# Patient Record
Sex: MALE | Race: WHITE | NOT HISPANIC OR LATINO | Employment: FULL TIME | URBAN - METROPOLITAN AREA
[De-identification: names, ages, dates, MRNs, and addresses within clinical notes are randomized per-mention and may not be internally consistent; named-entity substitution may affect disease eponyms.]

---

## 2019-05-24 ENCOUNTER — HOSPITAL ENCOUNTER (OUTPATIENT)
Dept: GENERAL RADIOLOGY | Facility: HOSPITAL | Age: 49
Discharge: HOME | End: 2019-05-24
Attending: NURSE PRACTITIONER

## 2019-06-13 ENCOUNTER — HOSPITAL ENCOUNTER (OUTPATIENT)
Dept: GENERAL RADIOLOGY | Facility: HOSPITAL | Age: 49
Discharge: HOME | End: 2019-06-13
Attending: NURSE PRACTITIONER

## 2019-08-31 ENCOUNTER — HOSPITAL ENCOUNTER (OUTPATIENT)
Dept: LAB | Facility: HOSPITAL | Age: 49
Discharge: HOME | End: 2019-08-31
Attending: EMERGENCY MEDICINE

## 2020-01-21 ENCOUNTER — HOSPITAL ENCOUNTER (OUTPATIENT)
Dept: SLEEP MEDICINE | Facility: HOSPITAL | Age: 50
Discharge: HOME | End: 2020-01-21
Attending: PSYCHIATRY & NEUROLOGY

## 2020-02-10 ENCOUNTER — HOSPITAL ENCOUNTER (OUTPATIENT)
Dept: SLEEP MEDICINE | Facility: HOSPITAL | Age: 50
Discharge: HOME | End: 2020-02-10
Attending: PSYCHIATRY & NEUROLOGY

## 2020-05-19 ENCOUNTER — HOSPITAL ENCOUNTER (OUTPATIENT)
Dept: GENERAL RADIOLOGY | Facility: HOSPITAL | Age: 50
Discharge: HOME | End: 2020-05-19
Attending: FAMILY MEDICINE

## 2020-06-25 ENCOUNTER — HOSPITAL ENCOUNTER (OUTPATIENT)
Dept: SLEEP MEDICINE | Facility: HOSPITAL | Age: 50
Discharge: HOME | End: 2020-06-25
Attending: PSYCHIATRY & NEUROLOGY

## 2020-09-25 ENCOUNTER — OFFICE VISIT CONVERTED (OUTPATIENT)
Dept: SURGERY | Facility: CLINIC | Age: 50
End: 2020-09-25
Attending: SURGERY

## 2020-10-03 ENCOUNTER — HOSPITAL ENCOUNTER (OUTPATIENT)
Dept: PREADMISSION TESTING | Facility: HOSPITAL | Age: 50
Discharge: HOME | End: 2020-10-03
Attending: SURGERY

## 2020-10-03 LAB — SARS-COV-2 RNA SPEC QL NAA+PROBE: NOT DETECTED

## 2020-10-08 ENCOUNTER — HOSPITAL ENCOUNTER (OUTPATIENT)
Dept: PERIOP | Facility: HOSPITAL | Age: 50
Setting detail: HOSPITAL OUTPATIENT SURGERY
Discharge: HOME | End: 2020-10-08
Attending: SURGERY

## 2020-10-23 ENCOUNTER — OFFICE VISIT CONVERTED (OUTPATIENT)
Dept: SURGERY | Facility: CLINIC | Age: 50
End: 2020-10-23
Attending: SURGERY

## 2020-10-23 ENCOUNTER — CONVERSION ENCOUNTER (OUTPATIENT)
Dept: SURGERY | Facility: CLINIC | Age: 50
End: 2020-10-23

## 2020-11-06 ENCOUNTER — OFFICE VISIT CONVERTED (OUTPATIENT)
Dept: SURGERY | Facility: CLINIC | Age: 50
End: 2020-11-06
Attending: SURGERY

## 2020-12-15 ENCOUNTER — HOSPITAL ENCOUNTER (OUTPATIENT)
Dept: SLEEP MEDICINE | Facility: HOSPITAL | Age: 50
Discharge: HOME | End: 2020-12-15
Attending: PSYCHIATRY & NEUROLOGY

## 2021-01-16 ENCOUNTER — HOSPITAL ENCOUNTER (OUTPATIENT)
Dept: OTHER | Facility: HOSPITAL | Age: 51
Discharge: HOME | End: 2021-01-16
Attending: PHYSICIAN ASSISTANT

## 2021-01-16 LAB
25(OH)D3 SERPL-MCNC: 13.2 NG/ML (ref 30–100)
ALBUMIN SERPL-MCNC: 3.4 G/DL (ref 3.5–5)
ALBUMIN/GLOB SERPL: 1 {RATIO} (ref 1.4–2.6)
ALP SERPL-CCNC: 80 U/L (ref 53–128)
ALT SERPL-CCNC: 29 U/L (ref 10–40)
ANION GAP SERPL CALC-SCNC: 21 MMOL/L (ref 8–19)
APPEARANCE UR: ABNORMAL
AST SERPL-CCNC: 44 U/L (ref 15–50)
BASOPHILS # BLD AUTO: 0.06 10*3/UL (ref 0–0.2)
BASOPHILS NFR BLD AUTO: 0.6 % (ref 0–3)
BILIRUB SERPL-MCNC: 1 MG/DL (ref 0.2–1.3)
BILIRUB UR QL: NEGATIVE
BUN SERPL-MCNC: 6 MG/DL (ref 5–25)
BUN/CREAT SERPL: 7 {RATIO} (ref 6–20)
CALCIUM SERPL-MCNC: 8.9 MG/DL (ref 8.7–10.4)
CHLORIDE SERPL-SCNC: 95 MMOL/L (ref 99–111)
CHOLEST SERPL-MCNC: 138 MG/DL (ref 107–200)
CHOLEST/HDLC SERPL: 3.4 {RATIO} (ref 3–6)
COLOR UR: YELLOW
CONV ABS IMM GRAN: 0.05 10*3/UL (ref 0–0.2)
CONV BACTERIA: NEGATIVE
CONV CO2: 23 MMOL/L (ref 22–32)
CONV COLLECTION SOURCE (UA): ABNORMAL
CONV IMMATURE GRAN: 0.5 % (ref 0–1.8)
CONV TOTAL PROTEIN: 6.9 G/DL (ref 6.3–8.2)
CONV UROBILINOGEN IN URINE BY AUTOMATED TEST STRIP: 1 {EHRLICHU}/DL (ref 0.1–1)
CREAT UR-MCNC: 0.83 MG/DL (ref 0.7–1.2)
DEPRECATED RDW RBC AUTO: 48 FL (ref 35.1–43.9)
EOSINOPHIL # BLD AUTO: 0.19 10*3/UL (ref 0–0.7)
EOSINOPHIL # BLD AUTO: 1.9 % (ref 0–7)
ERYTHROCYTE [DISTWIDTH] IN BLOOD BY AUTOMATED COUNT: 14.3 % (ref 11.6–14.4)
GFR SERPLBLD BASED ON 1.73 SQ M-ARVRAT: >60 ML/MIN/{1.73_M2}
GLOBULIN UR ELPH-MCNC: 3.5 G/DL (ref 2–3.5)
GLUCOSE SERPL-MCNC: 118 MG/DL (ref 70–99)
GLUCOSE UR QL: NEGATIVE MG/DL
HCT VFR BLD AUTO: 54.9 % (ref 42–52)
HDLC SERPL-MCNC: 41 MG/DL (ref 40–60)
HGB BLD-MCNC: 18.5 G/DL (ref 14–18)
HGB UR QL STRIP: ABNORMAL
KETONES UR QL STRIP: NEGATIVE MG/DL
LDLC SERPL CALC-MCNC: 81 MG/DL (ref 70–100)
LEUKOCYTE ESTERASE UR QL STRIP: NEGATIVE
LYMPHOCYTES # BLD AUTO: 2.02 10*3/UL (ref 1–5)
LYMPHOCYTES NFR BLD AUTO: 20.3 % (ref 20–45)
MCH RBC QN AUTO: 31.1 PG (ref 27–31)
MCHC RBC AUTO-ENTMCNC: 33.7 G/DL (ref 33–37)
MCV RBC AUTO: 92.3 FL (ref 80–96)
MONOCYTES # BLD AUTO: 1.2 10*3/UL (ref 0.2–1.2)
MONOCYTES NFR BLD AUTO: 12.1 % (ref 3–10)
NEUTROPHILS # BLD AUTO: 6.41 10*3/UL (ref 2–8)
NEUTROPHILS NFR BLD AUTO: 64.6 % (ref 30–85)
NITRITE UR QL STRIP: NEGATIVE
NRBC CBCN: 0 % (ref 0–0.7)
OSMOLALITY SERPL CALC.SUM OF ELEC: 279 MOSM/KG (ref 273–304)
PH UR STRIP.AUTO: 6.5 [PH] (ref 5–8)
PLATELET # BLD AUTO: 332 10*3/UL (ref 130–400)
PMV BLD AUTO: 9.8 FL (ref 9.4–12.4)
POTASSIUM SERPL-SCNC: 3.6 MMOL/L (ref 3.5–5.3)
PROT UR QL: NEGATIVE MG/DL
PSA SERPL-MCNC: 0.53 NG/ML (ref 0–4)
RBC # BLD AUTO: 5.95 10*6/UL (ref 4.7–6.1)
RBC #/AREA URNS HPF: ABNORMAL /[HPF]
SODIUM SERPL-SCNC: 135 MMOL/L (ref 135–147)
SP GR UR: 1.01 (ref 1–1.03)
T4 FREE SERPL-MCNC: 1.3 NG/DL (ref 0.9–1.8)
TESTOST SERPL-MCNC: 92 NG/DL (ref 193–740)
TRIGL SERPL-MCNC: 79 MG/DL (ref 40–150)
TSH SERPL-ACNC: 3.8 M[IU]/L (ref 0.27–4.2)
URATE SERPL-MCNC: 8.2 MG/DL (ref 3.5–8.5)
VLDLC SERPL-MCNC: 16 MG/DL (ref 5–37)
WBC # BLD AUTO: 9.93 10*3/UL (ref 4.8–10.8)
WBC #/AREA URNS HPF: ABNORMAL /[HPF]

## 2021-01-21 LAB — TESTOSTERONE, FREE: 7.2 PG/ML (ref 6.8–21.5)

## 2021-03-05 ENCOUNTER — HOSPITAL ENCOUNTER (OUTPATIENT)
Dept: ORTHOPEDIC SURGERY | Facility: CLINIC | Age: 51
Setting detail: RECURRING SERIES
Discharge: HOME | End: 2021-04-12
Attending: EMERGENCY MEDICINE

## 2021-04-02 ENCOUNTER — OFFICE VISIT CONVERTED (OUTPATIENT)
Dept: ORTHOPEDIC SURGERY | Facility: CLINIC | Age: 51
End: 2021-04-02
Attending: ORTHOPAEDIC SURGERY

## 2021-04-13 ENCOUNTER — HOSPITAL ENCOUNTER (OUTPATIENT)
Dept: URGENT CARE | Facility: CLINIC | Age: 51
Discharge: HOME | End: 2021-04-13
Attending: PHYSICIAN ASSISTANT

## 2021-04-15 ENCOUNTER — HOSPITAL ENCOUNTER (OUTPATIENT)
Dept: GENERAL RADIOLOGY | Facility: HOSPITAL | Age: 51
Discharge: HOME | End: 2021-04-15
Attending: ORTHOPAEDIC SURGERY

## 2021-04-23 ENCOUNTER — HOSPITAL ENCOUNTER (OUTPATIENT)
Dept: MRI IMAGING | Facility: HOSPITAL | Age: 51
Discharge: HOME | End: 2021-04-23
Attending: ORTHOPAEDIC SURGERY

## 2021-04-27 ENCOUNTER — OFFICE VISIT CONVERTED (OUTPATIENT)
Dept: PULMONOLOGY | Facility: CLINIC | Age: 51
End: 2021-04-27
Attending: NURSE PRACTITIONER

## 2021-04-30 ENCOUNTER — OFFICE VISIT CONVERTED (OUTPATIENT)
Dept: ORTHOPEDIC SURGERY | Facility: CLINIC | Age: 51
End: 2021-04-30
Attending: ORTHOPAEDIC SURGERY

## 2021-05-10 NOTE — H&P
History and Physical      Patient Name: Kevin Mcdermott   Patient ID: [id number]   Sex: Male   YOB: 1970    Referring Provider: Dion Portillo MD    Visit Date: April 2, 2021    Provider: Cm Rosenthal MD   Location: [facility]   Location Address: [address] [place] [address]  [id number]   Location Phone: [phone]          Chief Complaint  · Right Elbow Injury      History Of Present Illness  Kevin Mcdermott is a 50 year old /White male who presents today to [facility].      Patient presents today for an evaluation of right elbow injury. Patient states pain in his elbow for 2 months. He injured his elbow while at work when trying to unload a wheelchair. Patient states immediate pain after trying to unload the wheelchair. He states pain on the medial aspect of his elbow. Patient states that he has pain certain range of motion. Patient went and saw [facility] and they gave him a prescription for therapy. He states that therapy has not provided him with no relief. He was prescribed Aleeve that has not provided him with relief. Patient denies any numbness and tingling in his hands. This is a worker's comp case.       Past Medical History  Degeneration of lumbar intervertebral disc; High blood pressure; Hypertension; Mid back pain; Pain, Lumbar; Thoracic back pain         Past Surgical History  Cholecystectomy; Gallbladder; Hemorrhoid surgery; Lumbar epidural steroid injections         Medication List  atenolol 25 mg oral tablet; diclofenac sodium 75 mg oral tablet,delayed release (DR/EC); Eliquis 5 mg oral tablet; hydrochlorothiazide 50 mg oral tablet; lansoprazole 30 mg oral capsule,delayed release(DR/EC); lisinopril 40 mg oral tablet; Vitamin D3 5,000 unit oral tablet         Allergy List  NO KNOWN DRUG ALLERGIES       Allergies Reconciled  Family Medical History  -Father's Family History Unknown; -Mother's Family History Unknown; *No Known Family History         Social  "History  Alcohol Use (Never); .; lives with children; Recreational Drug Use (Never); Smokeless tobacco (Current every day); Tobacco (Never); Working         Review of Systems  · Constitutional  o Denies  o : fever, chills, weight loss  · Cardiovascular  o Denies  o : chest pain, shortness of breath  · Gastrointestinal  o Denies  o : liver disease, heartburn, nausea, blood in stools  · Genitourinary  o Denies  o : painful urination, blood in urine  · Integument  o Denies  o : rash, itching  · Neurologic  o Denies  o : headache, weakness, loss of consciousness  · Musculoskeletal  o Denies  o : painful, swollen joints  · Psychiatric  o Denies  o : drug/alcohol addiction, anxiety, depression      Vitals  Date Time BP Position Site L\R Cuff Size HR RR TEMP (F) WT  HT  BMI kg/m2 BSA m2 O2 Sat FR L/min FiO2        04/02/2021 09:26 AM      86 - R   325lbs 4oz 5'  8\" 49.45 2.66 96 %            Physical Examination  · Constitutional  o Appearance  o : well developed, well-nourished, no obvious deformities present  · Head and Face  o Head  o :   § Inspection  § : normocephalic  o Face  o :   § Inspection  § : no facial lesions  · Eyes  o Conjunctivae  o : conjunctivae normal  o Sclerae  o : sclerae white  · Ears, Nose, Mouth and Throat  o Ears  o :   § External Ears  § : appearance within normal limits  § Hearing  § : intact  o Nose  o :   § External Nose  § : appearance normal  · Neck  o Inspection/Palpation  o : normal appearance  o Range of Motion  o : full range of motion  · Respiratory  o Respiratory Effort  o : breathing unlabored  o Inspection of Chest  o : normal appearance  o Auscultation of Lungs  o : no audible wheezing or rales  · Cardiovascular  o Heart  o : regular rate  · Gastrointestinal  o Abdominal Examination  o : soft and non-tender  · Skin and Subcutaneous Tissue  o General Inspection  o : intact, no rashes  · Psychiatric  o General  o : Alert and oriented x3  o Judgement and Insight  o : " judgment and insight intact  o Mood and Affect  o : mood normal, affect appropriate  · Right Elbow  o Inspection  o : Sensation grossly intact. Neurovascular intact. No swelling, skin discoloration or atrophy. Tenderness to medial epicondyle. Full elbow range of motion. Good tone of deltoid, biceps, triceps, wrist extensors, and wrist flexors. Radial pulse 2+, ulnar pulse 2+. Pain with resisted wrist extension.   · In Office Procedures  o View  o : AP/LATERAL  o Site  o : right, elbow   o Indication  o : Right elbow pain  o Study  o : X-rays ordered, taken in the office, and reviewed today.  o Xray  o : No evidence of fracture or dislocation.               Assessment  · Right elbow pain     719.42/M25.521  · Medial epicondylitis, right     726.31/M77.00  · Elbow injury, right     959.3/S59.909A      Plan  · Orders  o Elbow (Right) 2 views X-Ray [facility] (48347-PM) - 719.42/M25.521 - 04/02/2021  · Medications  o Medications have been Reconciled  o Transition of Care or Provider Policy  · Instructions  o Dr. Rosenthal saw and examined the patient and agrees with plan.   o X-rays reviewed by Dr. Rosenthal.  o Reviewed the patient's Past Medical, Social, and Family history as well as the ROS at today's visit, no changes.  o Call or return if worsening symptoms.  o Exercise handout given.  o Follow up after MRI.  o Discussed treatment plans and diagnosis with the patient. Discussed injections, different medication and MRI. Patient opted to get an MRI of his elbow to rule out any ligament tears. He was given an at home exercise program to work on for the time being. Patient was given a modified work duty note today.   o The above service was scribed by Leslie Cummings on my behalf and I attest to the accuracy of the note. mc            Electronically Signed by: Leslie Cummings-, Other -Author on April 2, 2021 01:03:08 PM  Electronically Co-signed by: Cm Rosenthal MD -Reviewer on April 5, 2021 10:23:13 PM

## 2021-05-10 NOTE — H&P
History and Physical      Patient Name: Kevin Bradshaw   Patient ID: [id number]   Sex: Male   YOB: 1970    Referring Provider: Dion Portillo MD    Visit Date: September 25, 2020    Provider: Kevin Tinoco MD   Location: [facility]   Location Address: [address] [place] [address]  [id number]   Location Phone: [phone]          Chief Complaint  · Outpatient History & Physical / Surgical Orders  · Hemorrhoids      History Of Present Illness     Mr. Bradshaw came in today for evaluation. He is a very nice gentleman who has been having trouble with hemorrhoid symptoms for a while now. They seem to be getting worse. He has used hemorrhoid ointments in the past but here recently it doesn't seem to be as effective. He is starting to have a little bit more bleeding too than what he had before.       Past Medical History  Degeneration of lumbar intervertebral disc; High blood pressure; Hypertension; Mid back pain; Pain, Lumbar; Thoracic back pain         Past Surgical History  Cholecystectomy; Gallbladder; Lumbar epidural steroid injections         Medication List  atenolol 25 mg oral tablet; hydrochlorothiazide 50 mg oral tablet; lansoprazole 30 mg oral capsule,delayed release(DR/EC); lisinopril 40 mg oral tablet; nabumetone 500 mg oral tablet; Robaxin 500 mg oral tablet; Vitamin D3 5,000 unit oral tablet         Allergy List  NO KNOWN DRUG ALLERGIES         Family Medical History  -Father's Family History Unknown; -Mother's Family History Unknown; *No Known Family History         Social History  Smokeless tobacco (Current every day); Tobacco (Never)         Review of Systems  · Constitutional  o Denies  o : fever  · Eyes  o Denies  o : yellowish discoloration of eyes  · HENT  o Denies  o : difficulty swallowing  · Cardiovascular  o Denies  o : chest pain on exertion  · Respiratory  o Denies  o : shortness of breath  · Gastrointestinal  o Denies  o : nausea, vomiting, diarrhea,  "constipation  · Integument  o Denies  o : rash  · Neurologic  o Denies  o : tingling or numbness  · Musculoskeletal  o Denies  o : joint pain  · Endocrine  o Denies  o : weight gain, weight loss      Vitals  Date Time BP Position Site L\R Cuff Size HR RR TEMP (F) WT  HT  BMI kg/m2 BSA m2 O2 Sat HC       09/25/2020 11:26 AM       20  318lbs 0oz 5'  8.5\" 47.65 2.64           Physical Examination  · Constitutional  o Appearance  o : well-nourished, well developed, alert, in no acute distress  · Head and Face  o Head  o :   § Inspection  § : atraumatic, normocephalic  · Neck  o Inspection/Palpation  o : supple, normal range of motion  · Respiratory  o Inspection of Chest  o : normal inspection  o Auscultation of Lungs  o : breath sounds normal, no distress, clear to ascultate bilaterally  · Cardiovascular  o Heart  o :   § Auscultation of Heart  § : regular rate and rhythm, no murmur, gallop or rub  · Gastrointestinal  o Abdominal Examination  o : normal bowel sounds, non-tender, soft          Assessment  · Pre-Surgical Orders     V72.84       Very nice gentleman who has symptomatic hemorrhoids. His symptoms have been getting a little bit worse and would probably like to go ahead and have a procedure if possible.     Problems Reconciled  Plan  · Orders  o General Surgery Order (GENOR) - - 09/25/2020  · Medications  o Medications have been Reconciled  o Transition of Care or Provider Policy  · Instructions  o ****Surgical Orders****  o RISK AND BENEFITS:  o Consent for surgery: Given these options, the patient has verbally expressed an understanding of the risks of surgery and finds these risks acceptable. We will proceed with surgery as soon as possible.  o Consult Anesthesia for any post-operative block, or any pain management procedure deemed necessary by the anestesiologist for adequate post-operative pain control.   o O.R. PREP: Per protocol  o PLEASE SIGN PERMIT FOR:  o *___The above History and Physical " Examination has been completed within 30 days of admission.     I have told him I think he would be a good candidate for hemorrhoid banding. It seems like most of his hemorrhoid tissue is probably internal. I have described the procedure to him as well as the risks and benefits and he is agreeable to proceeding.             Electronically Signed by: Roz Chavez-, -Author on September 25, 2020 02:05:33 PM  Electronically Co-signed by: Kevin Tinoco MD -Reviewer on October 2, 2020 12:33:35 PM

## 2021-05-13 NOTE — PROGRESS NOTES
Progress Note      Patient Name: Kevin Bradshaw   Patient ID: [id number]   Sex: Male   YOB: 1970    Referring Provider: Dion Portillo MD    Visit Date: October 23, 2020    Provider: Kevin Tinoco MD   Location: [facility]   Location Address: [address] [place] [address]  [id number]   Location Phone: [phone]          Chief Complaint  · Follow Up Office Visit      History Of Present Illness     Mr. Bradshaw came back for follow-up. He is doing well following a hemorrhoid banding procedure. He is feeling better. He is not having as much discomfort with his bowel movements. He is not bleeding at this point.       Past Medical History  Degeneration of lumbar intervertebral disc; High blood pressure; Hypertension; Mid back pain; Pain, Lumbar; Thoracic back pain         Past Surgical History  Cholecystectomy; Gallbladder; Hemorrhoid surgery; Lumbar epidural steroid injections         Medication List  atenolol 25 mg oral tablet; hydrochlorothiazide 50 mg oral tablet; lansoprazole 30 mg oral capsule,delayed release(DR/EC); lisinopril 40 mg oral tablet; nabumetone 500 mg oral tablet; Robaxin 500 mg oral tablet; Vitamin D3 5,000 unit oral tablet         Allergy List  NO KNOWN DRUG ALLERGIES         Family Medical History  -Father's Family History Unknown; -Mother's Family History Unknown; *No Known Family History         Social History  Smokeless tobacco (Current every day); Tobacco (Never)         Review of Systems  · Cardiovascular  o Denies  o : chest pain, irregular heart beats, rapid heart rate, chest pain on exertion, shortness of breath, lower extremity swelling  · Respiratory  o Denies  o : shortness of breath, wheezing, cough, wheezing, chronic cough, coughing up blood  · Gastrointestinal  o Denies  o : nausea, vomiting, diarrhea, chronic abdominal pain, reflux symptoms      Vitals  Date Time BP Position Site L\R Cuff Size HR RR TEMP (F) WT  HT  BMI kg/m2 BSA m2 O2 Sat FR  "L/min FiO2        10/23/2020 09:01 AM       18  318lbs 0oz 5'  8.5\" 47.65 2.64             Physical Examination     Today on physical exam, he appears well. His abdomen is soft.           Assessment  · Postoperative Exam Following Surgery     V67.00       Doing okay status post hemorrhoid banding.       Plan  · Medications  o Medications have been Reconciled     I will see him back in two weeks. We will see how he is feeling then.             Electronically Signed by: Roz Chavez-, -Author on October 23, 2020 10:16:48 AM  Electronically Co-signed by: Kevin Tinoco MD -Reviewer on October 26, 2020 09:55:06 AM  "

## 2021-05-13 NOTE — PROGRESS NOTES
Progress Note      Patient Name: Kevin Mcdermott   Patient ID: [id number]   Sex: Male   YOB: 1970    Referring Provider: Dion Portillo MD    Visit Date: November 6, 2020    Provider: Kevin Tinoco MD   [facility]: [facility]   Location Address: [address] [place] [address]  [id number]   Location Phone: [phone]          Chief Complaint  · Follow Up Office Visit      History Of Present Illness     Kevin came back for follow-up. He is continuing to do well following a hemorrhoid banding procedure. He states that his pain is about 85% better than before than procedure and he is not having any bleeding at this point.       Past Medical History  Degeneration of lumbar intervertebral disc; High blood pressure; Hypertension; Mid back pain; Pain, Lumbar; Thoracic back pain         Past Surgical History  Cholecystectomy; Gallbladder; Hemorrhoid surgery; Lumbar epidural steroid injections         Medication List  atenolol 25 mg oral tablet; hydrochlorothiazide 50 mg oral tablet; lansoprazole 30 mg oral capsule,delayed release(DR/EC); lisinopril 40 mg oral tablet; nabumetone 500 mg oral tablet; Robaxin 500 mg oral tablet; Vitamin D3 5,000 unit oral tablet         Allergy List  NO KNOWN DRUG ALLERGIES         Family Medical History  -Father's Family History Unknown; -Mother's Family History Unknown; *No Known Family History         Social History  Smokeless tobacco (Current every day); Tobacco (Never)         Review of Systems  · Cardiovascular  o Denies  o : chest pain, irregular heart beats, rapid heart rate, chest pain on exertion, shortness of breath, lower extremity swelling  · Respiratory  o Denies  o : shortness of breath, wheezing, cough, wheezing, chronic cough, coughing up blood  · Gastrointestinal  o Denies  o : nausea, vomiting, diarrhea, chronic abdominal pain, reflux symptoms      Vitals  Date Time BP Position Site L\R Cuff Size HR RR TEMP (F) WT  HT  BMI kg/m2 BSA m2 O2  "Sat FR L/min FiO2 HC       11/06/2020 11:16 AM       16  317lbs 0oz 5'  8.5\" 47.5 2.64             Physical Examination     Today on physical exam, his perianal area looks good. He does not have any external disease.           Assessment  · Postoperative Exam Following Surgery     V67.00       Doing better status post hemorrhoid banding.       Plan  · Medications  o Medications have been Reconciled  o Transition of Care or Provider Policy     We will have him continue to try and avoid any straining or constipation. Otherwise, I will see him back on an as needed basis.             Electronically Signed by: Roz Chavez-, -Author on November 9, 2020 11:46:15 AM  Electronically Co-signed by: Kevin Tinoco MD -Reviewer on November 10, 2020 08:59:25 AM  "

## 2021-05-14 VITALS — WEIGHT: 315 LBS | HEIGHT: 68 IN | RESPIRATION RATE: 16 BRPM | BODY MASS INDEX: 47.74 KG/M2

## 2021-05-14 VITALS — RESPIRATION RATE: 18 BRPM | WEIGHT: 315 LBS | BODY MASS INDEX: 47.74 KG/M2 | HEIGHT: 68 IN

## 2021-05-14 VITALS — HEIGHT: 68 IN | HEART RATE: 76 BPM | WEIGHT: 315 LBS | OXYGEN SATURATION: 97 % | BODY MASS INDEX: 47.74 KG/M2

## 2021-05-14 VITALS — OXYGEN SATURATION: 96 % | HEART RATE: 86 BPM | BODY MASS INDEX: 47.74 KG/M2 | HEIGHT: 68 IN | WEIGHT: 315 LBS

## 2021-05-14 VITALS — RESPIRATION RATE: 20 BRPM | BODY MASS INDEX: 47.74 KG/M2 | HEIGHT: 68 IN | WEIGHT: 315 LBS

## 2021-05-14 NOTE — PROGRESS NOTES
Progress Note      Patient Name: Kevin Mcdermott   Patient ID: [id number]   Sex: Male   YOB: 1970    Referring Provider: Dion Portillo MD    Visit Date: April 30, 2021    Provider: Cm Rosenthal MD   Location: [facility]   Location Address: [address] [place] [address]  [id number]   Location Phone: [phone]          Chief Complaint  · Right Elbow Pain-MRI      History Of Present Illness  Kevin Mcdermott is a 50 year old /White male who presents today to [facility]. Patient is here for evaluation of his right elbow. He has been having pain since unloading a wheelchair. We got a MRI that shows no complete tears, lot of tendinitis.       Past Medical History  Degeneration of lumbar intervertebral disc; High blood pressure; Hypertension; Mid back pain; Pain, Lumbar; Thoracic back pain         Past Surgical History  Cholecystectomy; Gallbladder; Hemorrhoid surgery; Lumbar epidural steroid injections         Medication List  atenolol 25 mg oral tablet; diclofenac sodium 75 mg oral tablet,delayed release (DR/EC); Eliquis 5 mg oral tablet; hydrochlorothiazide 50 mg oral tablet; lansoprazole 30 mg oral capsule,delayed release(DR/EC); lisinopril 40 mg oral tablet; Vitamin D3 5,000 unit oral tablet         Allergy List  NO KNOWN DRUG ALLERGIES         Family Medical History  -Father's Family History Unknown; -Mother's Family History Unknown; *No Known Family History         Social History  Alcohol Use (Never); .; lives with children; Recreational Drug Use (Never); Smokeless tobacco (Current every day); Tobacco (Never); Working         Review of Systems  · Constitutional  o Denies  o : fever, chills, weight loss  · Cardiovascular  o Denies  o : chest pain, shortness of breath  · Gastrointestinal  o Denies  o : liver disease, heartburn, nausea, blood in stools  · Genitourinary  o Denies  o : painful urination, blood in urine  · Integument  o Denies  o : rash,  "itching  · Neurologic  o Denies  o : headache, weakness, loss of consciousness  · Musculoskeletal  o Denies  o : painful, swollen joints  · Psychiatric  o Denies  o : drug/alcohol addiction, anxiety, depression      Vitals  Date Time BP Position Site L\R Cuff Size HR RR TEMP (F) WT  HT  BMI kg/m2 BSA m2 O2 Sat FR L/min FiO2 HC       04/30/2021 10:34 AM      76 - R   325lbs 0oz 5'  8\" 49.42 2.66 97 %            Physical Examination  · Constitutional  o Appearance  o : well developed, well-nourished, no obvious deformities present  · Head and Face  o Head  o :   § Inspection  § : normocephalic  o Face  o :   § Inspection  § : no facial lesions  · Eyes  o Conjunctivae  o : conjunctivae normal  o Sclerae  o : sclerae white  · Ears, Nose, Mouth and Throat  o Ears  o :   § External Ears  § : appearance within normal limits  § Hearing  § : intact  o Nose  o :   § External Nose  § : appearance normal  · Neck  o Inspection/Palpation  o : normal appearance  o Range of Motion  o : full range of motion  · Respiratory  o Respiratory Effort  o : breathing unlabored  o Inspection of Chest  o : normal appearance  o Auscultation of Lungs  o : no audible wheezing or rales  · Cardiovascular  o Heart  o : regular rate  · Gastrointestinal  o Abdominal Examination  o : soft and non-tender  · Skin and Subcutaneous Tissue  o General Inspection  o : intact, no rashes  · Psychiatric  o General  o : Alert and oriented x3  o Judgement and Insight  o : judgment and insight intact  o Mood and Affect  o : mood normal, affect appropriate  · Right Elbow  o Inspection  o : Sensation intact. Pulse is normal. Affect is pleasant. No skin discoloration, atrophy or swelling.   · Injection Note/Aspiration Note  o Site  o : right elbow  o Procedure  o : Procedure: After educating the patient, patient gave consent for procedure. After using Chloraprep, the joint space was injected. The patient tolerated the procedure well.   o Medication  o : 80 mg of " DepoMedrol with 1cc of 1% Lidocaine          Assessment  · Right elbow pain     719.42/M25.521  · Right elbow tendinitis     726.90/M77.9      Plan  · Orders  o Depo-Medrol injection 80mg () - - 04/30/2021   Lot [id number] Exp 10 2021 [employer] Administered by DA Rosenthal MD  o Elbow-Lat Single Tendon (Injection) (45715) - - 04/30/2021   Lot [id number] Exp 12 2022 [employer] Administered by DA Rosenthal MD  · Medications  o Medications have been Reconciled  o Transition of Care or Provider Policy  · Instructions  o Reviewed the patient's Past Medical, Social, and Family history as well as the ROS at today's visit, no changes.  o Call or return if worsening symptoms.  o This note is transcribed by Calli Zacarias /sully  o Gave him a medial epicondyle shot. He is going to start some therapy and see how he does.             Electronically Signed by: Calli Zacarias, -Author on May 4, 2021 09:27:24 AM  Electronically Co-signed by: Cm Rosenthal MD -Reviewer on May 4, 2021 09:54:06 PM

## 2021-05-21 ENCOUNTER — OFFICE VISIT CONVERTED (OUTPATIENT)
Dept: ORTHOPEDIC SURGERY | Facility: CLINIC | Age: 51
End: 2021-05-21
Attending: PHYSICIAN ASSISTANT

## 2021-05-22 ENCOUNTER — TRANSCRIBE ORDERS (OUTPATIENT)
Dept: ADMINISTRATIVE | Facility: HOSPITAL | Age: 51
End: 2021-05-22

## 2021-05-22 DIAGNOSIS — I26.99 PULMONARY EMBOLISM, UNSPECIFIED CHRONICITY, UNSPECIFIED PULMONARY EMBOLISM TYPE, UNSPECIFIED WHETHER ACUTE COR PULMONALE PRESENT (HCC): Primary | ICD-10-CM

## 2021-05-25 ENCOUNTER — OFFICE VISIT CONVERTED (OUTPATIENT)
Dept: SURGERY | Facility: CLINIC | Age: 51
End: 2021-05-25
Attending: SURGERY

## 2021-05-25 ENCOUNTER — CONVERSION ENCOUNTER (OUTPATIENT)
Dept: SURGERY | Facility: CLINIC | Age: 51
End: 2021-05-25

## 2021-05-28 VITALS
HEART RATE: 75 BPM | DIASTOLIC BLOOD PRESSURE: 85 MMHG | SYSTOLIC BLOOD PRESSURE: 137 MMHG | BODY MASS INDEX: 47.74 KG/M2 | RESPIRATION RATE: 17 BRPM | WEIGHT: 315 LBS | TEMPERATURE: 97.2 F | OXYGEN SATURATION: 98 % | HEIGHT: 68 IN

## 2021-05-28 NOTE — PROGRESS NOTES
Patient: ALEXIS JORGE     Acct: [id number]     Report: #[id number]  UNIT #: [id number]     : 1970    Encounter Date:2021  PRIMARY CARE: GANGA SOARES  ***Signed***  --------------------------------------------------------------------------------------------------------------------  Chief Complaint      Encounter Date      2021            Primary Care Provider      Bianca Rudolph            Referring Provider            [facility]            Patient Complaint      Patient is complaining of      PT here today for [facility] F/U, Pulmonary Embolism            VITALS      Height 5 ft 8.00 in / 172.72 cm      Weight 317 lbs  / 143.737955 kg      BSA 2.49 m2      BMI 48.2 kg/m2      Temperature 97.2 F / 36.22 C - Temporal      Pulse 75      Respirations 17      Blood Pressure 137/85 Sitting, Left Arm      Pulse Oximetry 98%, room air            HPI      The patient is a 50 year old male who was recently hospitalized at [facility] from 2021 to 2021 for acute bilateral     pulmonary emboli and saddle pulmonary embolus. The patient had presented to the     hospital short of breath. The patient had been feeling poorly over the past     seven days prior to admission to the hospital and was seen at the [facility]     and was treated for allergies.  The patient at that time denied any fever,     chills or having any recent falls.  Shortness of breath became progressive over     the past 24 hours prior to admission and he became very dyspneic, worse with any    activity at all and had some associated chest discomfort on the left side, sharp    in nature, worse with deep breathing in and with rest, moderate in severity.  No    use of anticoagulation and no history of blood clots.  The patient did have a CT    scan of the chest and it did show a saddle pulmonary embolus with extensive     bilateral PEs and left sided pleural effusion with concern for possible      hemothorax.  The patient does drink alcohol and was a heavy drinker about 4-5     months ago and has cut back to about 5-6 drinks per day.  Pulmonary and Critical    Care were consulted to assist with patient's care. Ultrasound of the chest was     performed on 03/22/2021.  The patient had a small left sided pleural effusion,     but no need for thoracentesis at that time.  The patient was started on Eliquis.     The patient will need at least six months of Eliquis, however there was     recommendation for lifelong anticoagulation given unprovoked PE.  A venous     Duplex study came back negative for DVT. COVID PCR came back negative.      Polycythemia vera workup was ordered. The patient states he is under the care of    Dr. Latham and is scheduled to have another scan of his chest in two weeks and    has had several labs drawn recently.  The patient states he is scheduled to     follow up with Dr. Latham again in two weeks. The patient states that since     discharge from the hospital he is feeling better.  The patient currently denies     any dyspnea, cough, wheezing, fever, chills, night sweats, hemoptysis, purulent     sputum production, chest pain, chest tightness, swollen glands in the head and     neck, abdominal pain, nausea, vomiting or diarrhea.   The patient denies any     headaches, myalgias, sore throat, changes in sense of taste and/or smell or any     other coronavirus or flu-like symptoms.  The patient denies any leg swelling,     paroxysmal nocturnal dyspnea or orthopnea.  The patient denies any hematuria,     hematochezia, hematemesis or epistaxis.  The patient states that he is wearing     his CPAP everynight and that he is under the care of  the sleep center who is     managing his CPAP.  The patient states that he does dip tobacco and he has     decreased his drinking down to 12 ounces a day.  The patient states he is able     to perform his ADLs without difficulty.  The patient denies  any history of any     respiratory issues such as asthma, emphysema or COPD.              I have personally reviewed the review of systems, past family, social, surgical     and medical histories and I agree with those as entered in the chart.      Copies To:   VALE CASTILLO      Constitutional:  Denies: Fatigue, Fever, Weight gain, Weight loss, Chills,     Insomnia, Other      Respiratory/Breathing:  Denies: Shortness of air, Wheezing, Cough, Hemoptysis,     Pleuritic pain, Other      Endocrine:  Denies: Polydipsia, Polyuria, Heat/cold intolerance, Diabetes, Other      Eyes:  Denies: Blurred vision, Vision Changes, Other      Ears, nose, mouth, throat:  Denies: Mouth lesions, Thrush, Throat pain,     Hoarseness, Allergies/Hay Fever, Post Nasal Drip, Headaches, Recent Head Injury,    Nose Bleeding, Neck Stiffness, Thyroid Mass, Hearing Loss, Ear Fullness, Dry     Mouth, Nasal or Sinus Pain, Dry Lips, Nasal discharge, Nasal congestion, Other      Cardiovascular:  Denies: Palpitations, Syncope, Claudication, Chest Pain, Wake     up Gasping for air, Leg Swelling, Irregular Heart Rate, Cyanosis, Dyspnea on     Exertion, Other      Gastrointestinal:  Denies: Nausea, Constipation, Diarrhea, Abdominal pain,     Vomiting, Difficulty Swallowing, Reflux/Heartburn, Dysphagia, Jaundice,     Bloating, Melena, Bloody stools, Other      Genitourinary:  Denies: Urinary frequency, Incontinence, Hematuria, Urgency,     Nocturia, Dysuria, Testicular problems, Other      Musculoskeletal:  Denies: Joint Pain, Joint Stiffness, Joint Swelling, Myalgias,    Other      Hematologic/lymphatic:  DENIES: Lymphadenopathy, Bruising, Bleeding tendencies,     Other      Neurological:  Denies: Headache, Numbness, Weakness, Seizures, Other      Psychiatric:  Denies: Anxiety, Appropriate Effect, Depression, Other      Sleep:  No: Excessive daytime sleep, Morning Headache?, Snoring, Insomnia?, Stop    breathing at sleep?, Other       Integumentary:  Denies: Rash, Dry skin, Skin Warm to Touch, Other      Immunologic/Allergic:  Denies: Latex allergy, Seasonal allergies, Asthma,     Urticaria, Eczema, Other      Immunization status:  No: Up to date            FAMILY/SOCIAL/MEDICAL HX      Surgical History:  Yes: Cholecystectomy, Head Surgery (TONSILLECTOMY)      Social History:  Tobacco Use (currently just dips x 30 years)      Smoking status:  Never smoker (just dips x30 years)      Anticoagulation Therapy:  Yes      Antibiotic Prophylaxis:  No      Medical History:  Yes: Allergies, Heart Attack, Hemorrhoids/Rectal Prob (GERD),     High Blood Pressure; No: Arthritis, Asthma, Blood Disease, Chemotherapy/Cancer,     Chronic Bronchitis/COPD, Congestive Heart Failu, Deafness or Ringing Ears,     Diabetes, Seizures, Shortness Of Breath, Miscellaneous Medical/oth      Psychiatric History      none            PREVENTION      Hx Influenza Vaccination:  No      Influenza Vaccine Declined:  Yes      2 or More Falls in Past Year?:  No      Fall Past Year with Injury?:  No      Hx Pneumococcal Vaccination:  No      Encouraged to follow-up with:  PCP regarding preventative exams.      Chart initiated by      Keisha Ortiz CMA            ALLERGIES/MEDICATIONS      Allergies:        Coded Allergies:             ONION (Verified  Adverse Reaction, Mild, 4/27/21)           PEPPER (GENUS CAPSICUM) (Verified  Adverse Reaction, Mild, 4/27/21)                  ANY VARIETY OF PEPPERS      Medications    Last Reconciled on 4/27/21 13:37 by BRYANNA LIRA,       Apixaban (Eliquis) 5 Mg Tablet      5 MG PO BID for 30 Days, #60 TAB 5 Refills         Prov: Doc Paez         3/25/21       Thiamine Hcl (Vitamin B-1*) 100 Mg Tab      100 MG PO TID for 30 Days, #90 TAB 3 Refills         Prov: Doc Paez         3/25/21       Folic Acid (Folic Acid) 1 Mg Tablet      1 MG PO BID for 30 Days, #60 TAB 5 Refills         Prov: Doc Paez         3/25/21        Fexofenadine HCl (Fexofenadine HCl) 60 Mg Tablet      60 MG PO BID, #60 TAB 0 Refills         Reported         3/22/21       Azelastine Hcl (Azelastine Nasal) 137 Mcg/0.137 Ml Spray.pump      2 PUFFS NARE EACH BID, #30 ML         Reported         3/22/21       Allopurinol (Zyloprim*) 100 Mg Tablet      100 MG PO QDAY for 30 Days, #30 TAB 0 Refills         Reported         10/1/20       Pantoprazole (Protonix) 40 Mg Tablet.dr      40 MG PO QDAY@07, #30 TAB 0 Refills         Reported         10/1/20       Hctz (hydroCHLOROthiazide) 50 Mg Tablet      50 MG PO QDAY, TAB         Reported         10/1/20       Atenolol (ATENOLOL) 25 Mg Tablet      25 MG PO BID, #60 TAB 0 Refills         Prov: Vic Ross         6/20/17       Lisinopril* (Lisinopril*) 40 Mg Tablet      40 MG PO QDAY, #30 TAB 0 Refills         Reported         6/30/15      Current Medications      Current Medications Reviewed 4/27/21            EXAM      Vital Signs Reviewed.      General:  WDWN, Alert, NAD.      HEENT: PERRL, EOMI.  OP, nares clear, no sinus tenderness.      Neck: Supple, no JVD, no thyromegaly.      Lymph: No axillary, cervical, supraclavicular lymphadenopathy noted bilaterally.      Chest: Good aeration, clear to auscultation bilaterally, tympanic to percussion     bilaterally, no work of breathing noted.      CV: RRR, no MGR, pulses 2+, equal.        Abd: Soft, NT, ND, +BS, no HSM.      EXT: No clubbing, no cyanosis, no edema, no joint tenderness.        Neuro:  A  Skin: No rashes or lesions.      Vtials      Vitals:             Height 5 ft 8.00 in / 172.72 cm           Weight 317 lbs  / 143.601729 kg           BSA 2.49 m2           BMI 48.2 kg/m2           Temperature 97.2 F / 36.22 C - Temporal           Pulse 75           Respirations 17           Blood Pressure 137/85 Sitting, Left Arm           Pulse Oximetry 98%, room air            REVIEW      Results Reviewed      PCCS Results Reviewed?:  Yes Prev Lab Results, Yes Prev  Radiology Results, Yes     Previous M[facility] Records      Lab Results      I reviewed patient's discharge summary from recent hospital stay.  I also     reviewed patient's extremity venous study report, chest CT report and chest x-    ray reports.      Radiographic Results               [facility]                VASCULAR LAB REPORT            Patient: ALEXIS JORGE   Acct: #[id number]   Report: #[id number]            UNIT #: [id number]    DOS: 21 1717      INSURANCE:BLUE ACCESS NETWORK - PPO   ORDER #:[id number]      LOCATION:ICU  7   : 1970            PROVIDERS      ADMITTING:  Doc Paez   ATTENDING: Doc Paez      FAMILY:  Doc Paez   ORDERING:  Doc Paez         OTHER:    DICTATING:  SAMIR Quispe MD            REQ #:[id number]   EXAM:VELE - VENOUS EVAL LOWER DUPLEX      REASON FOR EXAM:  ACUTE HYPOXIC RESPIRATORY FAILURE, BILATERAL PE'S      REASON FOR VISIT:  ACUTE HYPOXIC RESPIRATORY FAILURE, BILATERAL PE'S            *******Signed******         PROCEDURE:   VENOUS EVAL LOWER DUPLEX             COMPARISON:   None.             INDICATIONS:   Pulmonary embolism.             TECHNIQUE:   Color duplex Doppler ultrasound evaluation and analysis of the deep    venous system of       both lower extremities was performed in the usual manner.               Right Impression:       Right common femoral vein shows normal flow and compressibility.  Greater     saphenous vein shows       normal compressibility.  Superficial femoral vein shows compressibility at all     levels with normal       augmentation responses.  Spontaneous flow is present.  Popliteal vein shows     normal compressibility       with normal spontaneous flow and slightly dampened augmentation.  The tibials     show patency.             Left Impression:       Not performed.  Patient refusal.             CONCLUSION:         1. This study is negative for an  acute deep venous thrombosis in the right lower    extremity.              Jose Quispe MD             Electronically Signed and Approved By: Jose Quispe MD on 3/23/2021 at 7:54                               Until signed, this is an unconfirmed preliminary report that may contain      errors and is subject to change.                                              YATGN:      D:21 0754               [facility]                PACS RADIOLOGY REPORT            Patient: ALEXIS JORGE   Acct: #[id number]   Report: #[id number]            UNIT #: [id number]    DOS: 21 0640      INSURANCE:BLUE ACCESS NETWORK - PPO   ORDER #:[id number]      LOCATION:ER     : 1970            PROVIDERS      ADMITTING:     ATTENDING:       FAMILY:  Doc Paez   ORDERING:  NEMO TROY         OTHER:    DICTATING:  WALTER DUVAL MD            REQ #:[id number]   EXAM:CHW - CT CHEST with CONTRAST      REASON FOR EXAM:  Chest Pain      REASON FOR VISIT:  SOA            *******Signed******         PROCEDURE:   CT CHEST W/ CONTRAST             COMPARISON:   [facility], CT, CT PULMONARY ANGIOGRAM, 2/15/2021,     5:46.             INDICATIONS:   GENERALIZED CHEST PAIN WITH SOB             TECHNIQUE:   After obtaining the patient's consent, CT images were obtained with    non-ionic       intravenous contrast material.               PROTOCOL:     Pulmonary embolism imaging protocol performed                RADIATION:     DLP: 851.6mGy*cm          Automated exposure control was utilized to minimize radiation dose.       CONTRAST:   100cc Isovue 370 I.V.             FINDINGS:         Extensive bilateral acute pulmonary embolus.  There is a saddle component.      Evaluation is somewhat       difficult secondary to body habitus and respiratory motion.  Embolus extends     into the segmental and       probably subsegmental pulmonary arteries in both lower lobes,  the right middle     lobe, and right       upper lobe.  There is probably also some embolus seen in the segmental and     subsegmental pulmonary       arteries of the left upper lobe.  Mild flattening of the interventricular     septum.             No evidence for acute aortic injury.  Moderate-sized left pleural effusion marcia    sures greater than       simple fluid attenuation.  No adenopathy in the chest.  No pneumothorax.  No     aggressive appearing       bone change.  Significant hepatic steatosis.             CONCLUSION:   Extensive bilateral acute pulmonary embolus as described above.      There is mild       flattening of the interventricular septum, suggesting a component of right heart    strain.             Moderate left pleural fluid collection, suspicious for hemothorax or complex     effusion.             Findings were discussed with the ordering clinician at the time of this di    ctation.              WALTER DUVAL MD             Electronically Signed and Approved By: WALTER DUVAL MD on 3/22/2021 at 10:22                               Until signed, this is an unconfirmed preliminary report that may contain      errors and is subject to change.                                              DOWER:      D:21 1022               [facility]                PACS RADIOLOGY REPORT            Patient: ALEXIS JORGE   Acct: #[id number]   Report: #[id number]            UNIT #: [id number]    DOS: 21 1531      INSURANCE:BLUE ACCESS NETWORK - O   ORDER #:[id number]      LOCATION:[facility]  420   : 1970            PROVIDERS      ADMITTING:  Doc Paez   ATTENDING: Doc Paez      FAMILY:  Doc Paez   ORDERING:  Carlos Pozo         OTHER:    DICTATING:  Hao Early MD, IV            REQ #:[id number]   EXAM:CXR2 - CHEST 2V AP PA LAT      REASON FOR EXAM:  effusion      REASON FOR VISIT:  ACUTE HYPOXIC RESPIRATORY  FAILURE, BILATERAL PE'S            *******Signed******         PROCEDURE:   CHEST AP/PA AND LATERAL             COMPARISON:   [facility], CT, CHEST W/ CONTRAST, 3/22/2021, 10:08.     [facility], CR, CHEST AP/PA 1 VIEW, 3/22/2021, 4:16.             INDICATIONS:   effusion             FINDINGS:                The lungs are well-expanded. The heart and pulmonary vasculature are within     normal limits.  There       is a small left pleural effusion.  There is mild left lower lobe airspace     consolidation.  No       evidence of pneumothorax.             IMPRESSION:  Small left pleural effusion.  Mild left lower lobe airspace     consolidation likely       atelectasis.             ELIZABETH GORDON MD             Electronically Signed and Approved By: ELIZABETH GORDON MD on 3/24/2021 at 16:44                                  Until signed, this is an unconfirmed preliminary report that may contain      errors and is subject to change.                                              BRYJE:      D:03/24/21 1644            Assessment      Pulmonary embolism - I26.99            Notes      Discontinued Medications      * Potassium Chloride 10 MEQ CAPSULE.ER: 10 MEQ PO TID 30 Days #90      * MAGNESIUM OXIDE (Magnesium Oxide*) 400 MG TABLET: 400 MG PO BID #60      * methylPREDNISolone Dosepak (Medrol Dosepak) 4 MG TAB.DS.PK: 4 MG PO ASDIR #1         Instructions: Take dosepack as directed. Take all of first day's tablets the        first day.      New Diagnostics      * Chest W/ Cont CT, 3 Months         Dx: Pulmonary embolism - I26.99      * Echo Complete         Dx: Pulmonary embolism - I26.99      ASSESSMENT:       1.  Acute saddle pulmonary embolus with bilateral pulmonary artery embolisms     with right heart strain.      2. Acute hypoxic respiratory secondary to #1.      3.  Left sided pleural effusion.      4. Alcohol use disorder.      5.  Tobacco abuse with dipping.      6. Obstructive  sleep apnea, patient on nightly CPAP, under the care of the Sleep    Center.              PLAN:      1. Discussed with patient hospital stay and plans moving forward.  I will     recheck a chest CT scan with contrast and echocardiogram again in three months     to ensure resolution of pulmonary embolism and to reassess right heart strain.      2. The patient reports he is under the care of Dr. Latham.  I will request a     copy of records and lab results from Dr. Latham's office.  Would recommend 6     months of Eliquis 5 mg twice a day, however would recommend lifelong     anticoagulation given unprovoked PE.      3. Patient is advised to call the office, 911 or go to the ER with any new or     worsening symptoms.      4. Follow up with Dr. Latham and his PCP as scheduled.      5. I spent three minutes counseling patient on alcohol use.   Alcohol counseling    was discussed with the patient and he declined any intervention at this time.     Risks of drinking alcohol was discussed with the patient in detail today which     include high blood pressure, heart disease, stroke, liver disease,  digestive     problems, memory problems, cancer of the breast, mouth, throat, esophagus,     liver, and colon, weakening of the immune system, mental health problems, etc.     The patient verbalized understanding. The patient states they will try to     decrease his drinking on his own.      6. I spent three minutes today counseling the patient on smoking cessation.  I     counseled the patient on the risks of continued smoking including the risk of     lung cancer, head and neck cancer, renal cancer, heart disease, stroke, and     early death. The patient refuses nicotine therapy and pharmacotherapy at this     time.  The patient is advised to avoid dipping.        7.  Patient refused flu, pneumonia and COVID vaccines. Risk of refusing vaccines    discussed with patient, patient verbalized understanding.  The patient is      advised to continue to follow CDC recommendations of social distancing, wearing     a mask and washing hands for at least 20 seconds.        8.  Follow up with Dr. Pozo in three months after tests are completed, sooner    if needed.            Patient Education      Patient Education Provided:  Acute Bronchitis      Time Spent:  > 50% /Coord Care            Electronically signed by BRYANNA LIRA [facility]  04/30/2021 08:52       Disclaimer: Converted document may not contain table formatting or lab diagrams. Please see [employer] for the authenticated document.

## 2021-06-04 ENCOUNTER — OFFICE VISIT CONVERTED (OUTPATIENT)
Dept: ORTHOPEDIC SURGERY | Facility: CLINIC | Age: 51
End: 2021-06-04
Attending: PHYSICIAN ASSISTANT

## 2021-06-05 NOTE — PROGRESS NOTES
Progress Note      Patient Name: Kevin Mcdermott   Patient ID: [id number]   Sex: Male   YOB: 1970    Referring Provider: Dion Portillo MD    Visit Date: June 4, 2021    Provider: Latia Mazariegos PA-C   Location: [facility]   Location Address: [address] [place] [address]  [id number]   Location Phone: [phone]          Chief Complaint  · Follow up right elbow pain      History Of Present Illness  Kevin Mcdermott is a 50 year old /White male who presents today to [facility]. Patient presents today for follow up of right elbow pain. Patient has been going to PT for partial thickness tears of tendons at the medial and lateral epicondyles as well as tendinopathy of the distal triceps at its insertion. 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 He has pain with lifting and has not exceeded 25 lbs in his therapy.       Past Medical History  Degeneration of lumbar intervertebral disc; High blood pressure; Hypertension; Mid back pain; Pain, Lumbar; Thoracic back pain         Past Surgical History  Cholecystectomy; Gallbladder; Hemorrhoid surgery; Lumbar epidural steroid injections         Medication List  atenolol 25 mg oral tablet; diclofenac sodium 75 mg oral tablet,delayed release (DR/EC); Eliquis 5 mg oral tablet; hydrochlorothiazide 50 mg oral tablet; lansoprazole 30 mg oral capsule,delayed release(DR/EC); lisinopril 40 mg oral tablet; Vitamin D3 5,000 unit oral tablet         Allergy List  NO KNOWN DRUG ALLERGIES       Allergies Reconciled  Family Medical History  -Father's Family History Unknown; -Mother's Family History Unknown; *No Known Family History         Social History  Alcohol Use (Never); .; lives with children; Recreational Drug Use (Never); Smokeless tobacco (Current every day); Tobacco (Never); Working         Review of Systems  · Constitutional  o Denies  o :  "fever, chills, weight loss  · Cardiovascular  o Denies  o : chest pain, shortness of breath  · Gastrointestinal  o Denies  o : liver disease, heartburn, nausea, blood in stools  · Genitourinary  o Denies  o : painful urination, blood in urine  · Integument  o Denies  o : rash, itching  · Neurologic  o Denies  o : headache, weakness, loss of consciousness  · Musculoskeletal  o Denies  o : painful, swollen joints  · Psychiatric  o Denies  o : drug/alcohol addiction, anxiety, depression      Vitals  Date Time BP Position Site L\R Cuff Size HR RR TEMP (F) WT  HT  BMI kg/m2 BSA m2 O2 Sat FR L/min FiO2 HC       06/04/2021 09:57 AM      71 - R   329lbs 0oz 5'  8\" 50.02 2.68 98 %            Physical Examination  · Constitutional  o Appearance  o : well developed, well-nourished, no obvious deformities present  · Head and Face  o Head  o :   § Inspection  § : normocephalic  o Face  o :   § Inspection  § : no facial lesions  · Eyes  o Conjunctivae  o : conjunctivae normal  o Sclerae  o : sclerae white  · Ears, Nose, Mouth and Throat  o Ears  o :   § External Ears  § : appearance within normal limits  § Hearing  § : intact  o Nose  o :   § External Nose  § : appearance normal  · Neck  o Inspection/Palpation  o : normal appearance  o Range of Motion  o : full range of motion  · Respiratory  o Respiratory Effort  o : breathing unlabored  o Inspection of Chest  o : normal appearance  o Auscultation of Lungs  o : no audible wheezing or rales  · Cardiovascular  o Heart  o : regular rate  · Gastrointestinal  o Abdominal Examination  o : soft and non-tender  · Skin and Subcutaneous Tissue  o General Inspection  o : intact, no rashes  · Psychiatric  o General  o : Alert and oriented x3  o Judgement and Insight  o : judgment and insight intact  o Mood and Affect  o : mood normal, affect appropriate  · Right Elbow  o Inspection  o : Tenderness of the medial epicondyle. Tenderness of the lateral epicondyle. Pain with wrist flexion " against resistance. Mild pain with protonation and supination. Good muscle tone of the biceps, triceps and deltoid. Sensation intact. Neurovascular intact. Radial pulse 2+.          Assessment  · Pain: Elbow     719.42/M25.529  · Tendinitis, elbow     726.90/M77.9      Plan  · Instructions  o Reviewed the patient's Past Medical, Social, and Family history as well as the ROS at today's visit, no changes.  o Call or return if worsening symptoms.  o Patient will remain off work for another 6 weeks and cont. PT during this time. In 6 weeks and then will consider another injection and determine his readiness to return to work full duty.            Electronically Signed by: RASHEL Steele-HEMA -Author on June 4, 2021 11:39:54 AM  Electronically Co-signed by: Cm Rosenthal MD -Reviewer on June 4, 2021 03:59:43 PM

## 2021-06-05 NOTE — H&P
"   History and Physical      Patient Name: Kevin Bradshaw   Patient ID: [id number]   Sex: Male   YOB: 1970    Referring Provider: Dion Portillo MD    Visit Date: May 25, 2021    Provider: Kevin Tinoco MD   Location: [facility]   Location Address: [address] [place] [address]  [id number]   Location Phone: [phone]          Chief Complaint  · rectus diastasis      History Of Present Illness     Mr. Bradshaw came in today for evaluation. He is a gentleman that we have taken care of in the past. He has a bulge in his upper abdomen. It is not really uncomfortable. He has not had an incision there before. He did have a PE diagnosed about six weeks ago and currently is on blood thinners.       Past Medical History  Degeneration of lumbar intervertebral disc; High blood pressure; Hypertension; Mid back pain; Pain, Lumbar; Thoracic back pain         Past Surgical History  Cholecystectomy; Gallbladder; Hemorrhoid surgery; Lumbar epidural steroid injections         Medication List  atenolol 25 mg oral tablet; diclofenac sodium 75 mg oral tablet,delayed release (DR/EC); Eliquis 5 mg oral tablet; hydrochlorothiazide 50 mg oral tablet; lansoprazole 30 mg oral capsule,delayed release(DR/EC); lisinopril 40 mg oral tablet; Vitamin D3 5,000 unit oral tablet         Allergy List  NO KNOWN DRUG ALLERGIES         Family Medical History  -Father's Family History Unknown; -Mother's Family History Unknown; *No Known Family History         Social History  Alcohol Use (Never); .; lives with children; Recreational Drug Use (Never); Smokeless tobacco (Current every day); Tobacco (Never); Working         Vitals  Date Time BP Position Site L\R Cuff Size HR RR TEMP (F) WT  HT  BMI kg/m2 BSA m2 O2 Sat FR L/min FiO2 HC       05/25/2021 02:05 PM       17  331lbs 6oz 5'  8\" 50.38 2.69             Physical Examination     Today on physical exam, he has a rectus diastasis in the upper midline abdomen. "           Assessment  · Rectus diastasis     728.84/M62.08      Plan  · Medications  o Medications have been Reconciled  o Transition of Care or Provider Policy     I reassured Mr. Bradshaw that this is not an issue that he would require surgery for and he was happy to hear that. I will see him back on an as needed basis.             Electronically Signed by: Roz Chavez-, -Author on May 26, 2021 09:31:44 AM  Electronically Co-signed by: Kevin Tinoco MD -Reviewer on May 26, 2021 04:17:48 PM

## 2021-06-05 NOTE — PROGRESS NOTES
Progress Note      Patient Name: Kevin Mcdermott   Patient ID: [id number]   Sex: Male   YOB: 1970    Referring Provider: Dion Portillo MD    Visit Date: May 21, 2021    Provider: Latia Mazariegos PA-C   Location: [facility]   Location Address: [address] [place] [address]  [id number]   Location Phone: [phone]          Chief Complaint  · Follow up right elbow pain      History Of Present Illness  Kevin Mcdermott is a 50 year old /White male who presents today to [facility]. Patient presents today for follow up of right elbow tendinitis. At his last visit with Dr. Rosenthal, he received injection of his medial epicondylitis which he states helped with his pain some. Dr. Rosenthal gave him an order for PT, he has only been able to attend twice due to understaffing of PT facility. He reports some pain of his elbow still.       Past Medical History  Degeneration of lumbar intervertebral disc; High blood pressure; Hypertension; Mid back pain; Pain, Lumbar; Thoracic back pain         Past Surgical History  Cholecystectomy; Gallbladder; Hemorrhoid surgery; Lumbar epidural steroid injections         Medication List  atenolol 25 mg oral tablet; diclofenac sodium 75 mg oral tablet,delayed release (DR/EC); Eliquis 5 mg oral tablet; hydrochlorothiazide 50 mg oral tablet; lansoprazole 30 mg oral capsule,delayed release(DR/EC); lisinopril 40 mg oral tablet; Vitamin D3 5,000 unit oral tablet         Allergy List  NO KNOWN DRUG ALLERGIES       Allergies Reconciled  Family Medical History  -Father's Family History Unknown; -Mother's Family History Unknown; *No Known Family History         Social History  Alcohol Use (Never); .; lives with children; Recreational Drug Use (Never); Smokeless tobacco (Current every day); Tobacco (Never); Working         Review of Systems  · Constitutional  o Denies  o : fever, chills, weight loss  · Cardiovascular  o Denies  o : chest pain, shortness  "of breath  · Gastrointestinal  o Denies  o : liver disease, heartburn, nausea, blood in stools  · Genitourinary  o Denies  o : painful urination, blood in urine  · Integument  o Denies  o : rash, itching  · Neurologic  o Denies  o : headache, weakness, loss of consciousness  · Musculoskeletal  o Denies  o : painful, swollen joints  · Psychiatric  o Denies  o : drug/alcohol addiction, anxiety, depression      Vitals  Date Time BP Position Site L\R Cuff Size HR RR TEMP (F) WT  HT  BMI kg/m2 BSA m2 O2 Sat FR L/min FiO2        05/21/2021 10:44 AM      76 - R   358lbs 0oz 5'  8\" 54.43 2.79 97 %            Physical Examination  · Constitutional  o Appearance  o : well developed, well-nourished, no obvious deformities present  · Head and Face  o Head  o :   § Inspection  § : normocephalic  o Face  o :   § Inspection  § : no facial lesions  · Eyes  o Conjunctivae  o : conjunctivae normal  o Sclerae  o : sclerae white  · Ears, Nose, Mouth and Throat  o Ears  o :   § External Ears  § : appearance within normal limits  § Hearing  § : intact  o Nose  o :   § External Nose  § : appearance normal  · Neck  o Inspection/Palpation  o : normal appearance  o Range of Motion  o : full range of motion  · Respiratory  o Respiratory Effort  o : breathing unlabored  o Inspection of Chest  o : normal appearance  o Auscultation of Lungs  o : no audible wheezing or rales  · Cardiovascular  o Heart  o : regular rate  · Gastrointestinal  o Abdominal Examination  o : soft and non-tender  · Skin and Subcutaneous Tissue  o General Inspection  o : intact, no rashes  · Psychiatric  o General  o : Alert and oriented x3  o Judgement and Insight  o : judgment and insight intact  o Mood and Affect  o : mood normal, affect appropriate  · Right Elbow  o Inspection  o : Good AROM of the elbow. Tenderness over medial epicondyle. Pain with protonation. Sensation intact. N/v intact, Radial and ulnar pulse are 2+.          Assessment  · Pain: " Elbow     719.42/M25.529  · Tendinitis, elbow     726.90/M77.9      Plan  · Instructions  o Reviewed the patient's Past Medical, Social, and Family history as well as the ROS at today's visit, no changes.  o Call or return if worsening symptoms.  o Follow Up in 2 weeks.  o Patient will attend PT sessions over the next two weeks. We will reevaluate pain and readiness for return to work next visit. May consider court-maker brace of elbow for his return to work.             Electronically Signed by: RASHEL Steele-HEMA -Author on May 21, 2021 11:47:36 AM  Electronically Co-signed by: Cm Rosenthal MD -Reviewer on May 26, 2021 08:04:58 PM

## 2021-06-20 ENCOUNTER — APPOINTMENT (OUTPATIENT)
Dept: GENERAL RADIOLOGY | Facility: HOSPITAL | Age: 51
End: 2021-06-20

## 2021-06-20 ENCOUNTER — HOSPITAL ENCOUNTER (EMERGENCY)
Facility: HOSPITAL | Age: 51
Discharge: HOME | End: 2021-06-21
Attending: EMERGENCY MEDICINE | Admitting: EMERGENCY MEDICINE

## 2021-06-20 DIAGNOSIS — R07.9 CHEST PAIN, UNSPECIFIED TYPE: Primary | ICD-10-CM

## 2021-06-20 LAB
ALBUMIN SERPL-MCNC: 3.9 G/DL (ref 3.5–5.2)
ALBUMIN/GLOB SERPL: 1.3 G/DL
ALP SERPL-CCNC: 61 U/L (ref 39–117)
ALT SERPL W P-5'-P-CCNC: 21 U/L (ref 1–41)
ANION GAP SERPL CALCULATED.3IONS-SCNC: 12.4 MMOL/L (ref 5–15)
AST SERPL-CCNC: 22 U/L (ref 1–40)
BASOPHILS # BLD AUTO: 0.05 10*3/MM3 (ref 0–0.2)
BASOPHILS NFR BLD AUTO: 0.5 % (ref 0–1.5)
BILIRUB SERPL-MCNC: 0.5 MG/DL (ref 0–1.2)
BUN SERPL-MCNC: 10 MG/DL (ref 6–20)
BUN/CREAT SERPL: 9.7 (ref 7–25)
CALCIUM SPEC-SCNC: 9.1 MG/DL (ref 8.6–10.5)
CHLORIDE SERPL-SCNC: 100 MMOL/L (ref 98–107)
CO2 SERPL-SCNC: 24.6 MMOL/L (ref 22–29)
CREAT SERPL-MCNC: 1.03 MG/DL (ref 0.76–1.27)
DEPRECATED RDW RBC AUTO: 42.4 FL (ref 37–54)
EOSINOPHIL # BLD AUTO: 0.3 10*3/MM3 (ref 0–0.4)
EOSINOPHIL NFR BLD AUTO: 2.7 % (ref 0.3–6.2)
ERYTHROCYTE [DISTWIDTH] IN BLOOD BY AUTOMATED COUNT: 12.9 % (ref 12.3–15.4)
GFR SERPL CREATININE-BSD FRML MDRD: 77 ML/MIN/1.73
GLOBULIN UR ELPH-MCNC: 3.1 GM/DL
GLUCOSE SERPL-MCNC: 131 MG/DL (ref 65–99)
HCT VFR BLD AUTO: 47.4 % (ref 37.5–51)
HGB BLD-MCNC: 15.8 G/DL (ref 13–17.7)
HOLD SPECIMEN: NORMAL
HOLD SPECIMEN: NORMAL
IMM GRANULOCYTES # BLD AUTO: 0.07 10*3/MM3 (ref 0–0.05)
IMM GRANULOCYTES NFR BLD AUTO: 0.6 % (ref 0–0.5)
LIPASE SERPL-CCNC: 23 U/L (ref 13–60)
LYMPHOCYTES # BLD AUTO: 1.5 10*3/MM3 (ref 0.7–3.1)
LYMPHOCYTES NFR BLD AUTO: 13.5 % (ref 19.6–45.3)
MCH RBC QN AUTO: 29.8 PG (ref 26.6–33)
MCHC RBC AUTO-ENTMCNC: 33.3 G/DL (ref 31.5–35.7)
MCV RBC AUTO: 89.3 FL (ref 79–97)
MONOCYTES # BLD AUTO: 1.06 10*3/MM3 (ref 0.1–0.9)
MONOCYTES NFR BLD AUTO: 9.5 % (ref 5–12)
NEUTROPHILS NFR BLD AUTO: 73.2 % (ref 42.7–76)
NEUTROPHILS NFR BLD AUTO: 8.12 10*3/MM3 (ref 1.7–7)
NRBC BLD AUTO-RTO: 0 /100 WBC (ref 0–0.2)
PLATELET # BLD AUTO: 300 10*3/MM3 (ref 140–450)
PMV BLD AUTO: 9.4 FL (ref 6–12)
POTASSIUM SERPL-SCNC: 3.6 MMOL/L (ref 3.5–5.2)
PROT SERPL-MCNC: 7 G/DL (ref 6–8.5)
RBC # BLD AUTO: 5.31 10*6/MM3 (ref 4.14–5.8)
SODIUM SERPL-SCNC: 137 MMOL/L (ref 136–145)
TROPONIN T SERPL-MCNC: <0.01 NG/ML (ref 0–0.03)
WBC # BLD AUTO: 11.1 10*3/MM3 (ref 3.4–10.8)
WHOLE BLOOD HOLD SPECIMEN: NORMAL

## 2021-06-20 PROCEDURE — 80053 COMPREHEN METABOLIC PANEL: CPT

## 2021-06-20 PROCEDURE — 93005 ELECTROCARDIOGRAM TRACING: CPT | Performed by: EMERGENCY MEDICINE

## 2021-06-20 PROCEDURE — 71045 X-RAY EXAM CHEST 1 VIEW: CPT

## 2021-06-20 PROCEDURE — 85025 COMPLETE CBC W/AUTO DIFF WBC: CPT

## 2021-06-20 PROCEDURE — 84484 ASSAY OF TROPONIN QUANT: CPT

## 2021-06-20 PROCEDURE — 99283 EMERGENCY DEPT VISIT LOW MDM: CPT

## 2021-06-20 PROCEDURE — 83690 ASSAY OF LIPASE: CPT

## 2021-06-20 PROCEDURE — 93005 ELECTROCARDIOGRAM TRACING: CPT

## 2021-06-20 RX ORDER — PANTOPRAZOLE SODIUM 40 MG/1
40 TABLET, DELAYED RELEASE ORAL DAILY
COMMUNITY
End: 2021-08-17 | Stop reason: SDUPTHER

## 2021-06-20 RX ORDER — SODIUM CHLORIDE 0.9 % (FLUSH) 0.9 %
10 SYRINGE (ML) INJECTION AS NEEDED
Status: DISCONTINUED | OUTPATIENT
Start: 2021-06-20 | End: 2021-06-21 | Stop reason: HOSPADM

## 2021-06-21 VITALS
BODY MASS INDEX: 47.74 KG/M2 | DIASTOLIC BLOOD PRESSURE: 46 MMHG | SYSTOLIC BLOOD PRESSURE: 107 MMHG | WEIGHT: 315 LBS | HEART RATE: 61 BPM | HEIGHT: 68 IN | RESPIRATION RATE: 16 BRPM | OXYGEN SATURATION: 95 % | TEMPERATURE: 98.6 F

## 2021-06-21 LAB
QT INTERVAL: 381 MS
TROPONIN T SERPL-MCNC: <0.01 NG/ML (ref 0–0.03)

## 2021-06-21 PROCEDURE — 84484 ASSAY OF TROPONIN QUANT: CPT | Performed by: EMERGENCY MEDICINE

## 2021-06-21 NOTE — ED PROVIDER NOTES
"Time: 15:21 EDT  Arrived by: private car  Chief Complaint:   Chief Complaint   Patient presents with   • Chest Pain     History provided by: PATIENT  History is limited by: N/A     History of Present Illness:  Patient is a 50 y.o. year old male that presents to the emergency department with complaints of chest pain. He reports that this started last night after eating. He states that he had a fish sandwich with chicken fries at [facility] for dinner last night and about an hour after is when his pain started. Pt notes that at one point today he tried to separate two buckets that were stuck together and strained himself doing it, so he states that his pain could be due to that.     Onset of Symptoms: 1 day(s) ago.  Duration:1 day(s)  Timing: constant   Location: GENERALIZED ABDOMEN  Quality: \"PAIN\"  Severity: MILD  Modifying factors: NOTHING  Associated signs/symptoms: NONE    Similar Symptoms Previously: NEVER  Recently seen: NO      Patient Care Team  Primary Care Provider: Bianca Rudolph PA-C    Past Medical History:       No Known Allergies  Past Medical History:   Diagnosis Date   • Allergies    • GERD (gastroesophageal reflux disease)    • Gout    • Hypertension    • Pulmonary embolism (CMS/HCC)      Past Surgical History:   Procedure Laterality Date   • CHOLECYSTECTOMY     • HEMORRHOIDECTOMY     • TONSILLECTOMY       Family History   Problem Relation Age of Onset   • Hypertension Mother    • Hypertension Father        Home Medications:  Prior to Admission medications    Medication Sig Start Date End Date Taking? Authorizing Provider   allopurinol (ZYLOPRIM) 100 MG tablet Take 100 mg by mouth Daily.    Emergency, Nurse GAMALIEL Jj   apixaban (Eliquis) 5 MG tablet tablet Eliquis 5 mg oral tablet take 1 tablet (5 mg) by oral route 2 times per day   Active    Emergency, Nurse GAMALIEL Jj   atenolol (TENORMIN) 25 MG tablet atenolol 25 mg oral tablet take 1 tablet (25 mg) by oral route once daily   Active    " Emergency, Nurse Epic, RN   cetirizine (zyrTEC) 10 MG tablet Take 10 mg by mouth Daily.    Emergency, Nurse Parviz RN   Cholecalciferol 125 MCG (5000 UT) tablet     Emergency, Nurse Parviz RN   diclofenac (VOLTAREN) 75 MG EC tablet diclofenac sodium 75 mg oral tablet,delayed release (DR/EC) take 1 tablet (75 mg) by oral route 2 times per day 4/2/2021  Active 4/2/21   Emergency, Nurse Parviz RN   folic acid (FOLVITE) 1 MG tablet Take 1 mg by mouth Daily.    Emergency, Nurse Parviz RN   hydroCHLOROthiazide (HYDRODIURIL) 50 MG tablet hydrochlorothiazide 50 mg oral tablet take 1 tablet (50 mg) by oral route once daily   Active    Emergency, Nurse Parviz RN   lansoprazole (PREVACID) 30 MG capsule lansoprazole 30 mg oral capsule,delayed release(DR/EC) take 1 capsule (30 mg) by oral route once daily before a meal   Active    Emergency, Nurse Parviz RN   lisinopril (PRINIVIL,ZESTRIL) 40 MG tablet lisinopril 40 mg oral tablet take 1 tablet (40 mg) by oral route once daily   Active    Emergency, Nurse Epic, RN   meloxicam (MOBIC) 15 MG tablet meloxicam 15 mg oral tablet take 1 tablet (15 mg) by oral route once daily   Suspended    Emergency, Nurse Epic, RN   thiamine (VITAMIN B-1) 100 MG tablet  tablet Take 100 mg by mouth Daily.    Emergency, Nurse GAMALIEL Jj        Social History:   Social History     Tobacco Use   • Smoking status: Never Smoker   • Smokeless tobacco: Never Used   Vaping Use   • Vaping Use: Never used   Substance Use Topics   • Alcohol use: Yes     Alcohol/week: 28.0 standard drinks     Types: 28 Standard drinks or equivalent per week   • Drug use: Never     Recent travel: not applicable     Record Review:  I have reviewed the patient's records in [facility].     Review of Systems:  Review of Systems   Constitutional: Negative for chills, fatigue and fever.   HENT: Negative for congestion, ear pain, rhinorrhea, sore throat and tinnitus.    Eyes: Negative for photophobia and pain.   Respiratory: Negative for choking and  "shortness of breath.    Cardiovascular: Positive for chest pain.   Gastrointestinal: Positive for abdominal pain. Negative for diarrhea, nausea and vomiting.   Endocrine: Negative for polydipsia.   Genitourinary: Negative for dysuria and hematuria.   Musculoskeletal: Negative for back pain and neck pain.   Skin: Negative for rash.   Allergic/Immunologic: Negative for food allergies.   Neurological: Negative for dizziness, seizures, light-headedness, numbness and headaches.   Hematological: Negative for adenopathy.   Psychiatric/Behavioral: Negative for self-injury and suicidal ideas.        Physical Exam:  /46   Pulse 61   Temp 98.6 °F (37 °C) (Oral)   Resp 16   Ht 172.7 cm (68\")   Wt (!) 154 kg (338 lb 13.6 oz)   SpO2 95%   BMI 51.52 kg/m²     Physical Exam  Vitals and nursing note reviewed.   Constitutional:       General: He is awake.      Appearance: Normal appearance.   HENT:      Head: Normocephalic and atraumatic.      Right Ear: Tympanic membrane, ear canal and external ear normal.      Left Ear: Tympanic membrane, ear canal and external ear normal.      Nose: Nose normal.      Mouth/Throat:      Mouth: Mucous membranes are moist.      Pharynx: Oropharynx is clear.   Eyes:      General: Lids are normal.      Extraocular Movements: Extraocular movements intact.      Conjunctiva/sclera: Conjunctivae normal.      Pupils: Pupils are equal, round, and reactive to light.   Cardiovascular:      Rate and Rhythm: Normal rate and regular rhythm.      Pulses: Normal pulses.      Heart sounds: Normal heart sounds.   Pulmonary:      Effort: Pulmonary effort is normal.      Breath sounds: Normal breath sounds and air entry.   Abdominal:      General: Bowel sounds are normal.      Palpations: Abdomen is soft.   Musculoskeletal:         General: Normal range of motion.      Right shoulder: Normal.      Left shoulder: Normal.      Right upper arm: Normal.      Left upper arm: Normal.      Right elbow: Normal.    "   Left elbow: Normal.      Right forearm: Normal.      Left forearm: Normal.      Right wrist: Normal.      Left wrist: Normal.      Right hand: Normal.      Left hand: Normal.      Cervical back: Normal, full passive range of motion without pain, normal range of motion and neck supple.      Thoracic back: Normal.      Lumbar back: Normal.      Right hip: Normal.      Left hip: Normal.      Right upper leg: Normal.      Left upper leg: Normal.      Right knee: Normal.      Left knee: Normal.      Right lower leg: Normal.      Left lower leg: Normal.      Right ankle: Normal.      Left ankle: Normal.      Right foot: Normal.      Left foot: Normal.   Skin:     General: Skin is warm and dry.   Neurological:      General: No focal deficit present.      Mental Status: He is alert and oriented to person, place, and time. Mental status is at baseline.      Cranial Nerves: Cranial nerves are intact.      Sensory: Sensation is intact.      Motor: Motor function is intact.      Coordination: Coordination is intact.   Psychiatric:         Attention and Perception: Attention and perception normal.         Mood and Affect: Mood and affect normal.         Speech: Speech normal.         Behavior: Behavior normal. Behavior is cooperative.         Thought Content: Thought content normal.         Cognition and Memory: Cognition and memory normal.         Judgment: Judgment normal.                Medications in the Emergency Department:  Medications - No data to display     Labs  Lab Results (last 24 hours)     Procedure Component Value Units Date/Time    CBC & Differential [[id number]]  (Abnormal) Collected: 06/20/21 2133    Specimen: Blood Updated: 06/20/21 2151    Narrative:      The following orders were created for panel order CBC & Differential.  Procedure                               Abnormality         Status                     ---------                               -----------         ------                     CBC Auto  Differential[[id number]]        Abnormal            Final result                 Please view results for these tests on the individual orders.    Comprehensive Metabolic Panel [[id number]]  (Abnormal) Collected: 06/20/21 2133    Specimen: Blood Updated: 06/20/21 2214     Glucose 131 mg/dL      BUN 10 mg/dL      Creatinine 1.03 mg/dL      Sodium 137 mmol/L      Potassium 3.6 mmol/L      Chloride 100 mmol/L      CO2 24.6 mmol/L      Calcium 9.1 mg/dL      Total Protein 7.0 g/dL      Albumin 3.90 g/dL      ALT (SGPT) 21 U/L      AST (SGOT) 22 U/L      Alkaline Phosphatase 61 U/L      Total Bilirubin 0.5 mg/dL      eGFR Non African Amer 77 mL/min/1.73      Globulin 3.1 gm/dL      A/G Ratio 1.3 g/dL      BUN/Creatinine Ratio 9.7     Anion Gap 12.4 mmol/L     Narrative:      GFR Normal >60  Chronic Kidney Disease <60  Kidney Failure <15      Lipase [[id number]]  (Normal) Collected: 06/20/21 2133    Specimen: Blood Updated: 06/20/21 2214     Lipase 23 U/L     Troponin [[id number]]  (Normal) Collected: 06/20/21 2133    Specimen: Blood Updated: 06/20/21 2214     Troponin T <0.010 ng/mL     Narrative:      Troponin T Reference Range:  <= 0.03 ng/mL-   Negative for AMI  >0.03 ng/mL-     Abnormal for myocardial necrosis.  Clinicians would have to utilize clinical acumen, EKG, Troponin and serial changes to determine if it is an Acute Myocardial Infarction or myocardial injury due to an underlying chronic condition.       Results may be falsely decreased if patient taking Biotin.      CBC Auto Differential [[id number]]  (Abnormal) Collected: 06/20/21 2133    Specimen: Blood Updated: 06/20/21 2151     WBC 11.10 10*3/mm3      RBC 5.31 10*6/mm3      Hemoglobin 15.8 g/dL      Hematocrit 47.4 %      MCV 89.3 fL      MCH 29.8 pg      MCHC 33.3 g/dL      RDW 12.9 %      RDW-SD 42.4 fl      MPV 9.4 fL      Platelets 300 10*3/mm3      Neutrophil % 73.2 %      Lymphocyte % 13.5 %      Monocyte % 9.5 %      Eosinophil % 2.7 %      Basophil %  0.5 %      Immature Grans % 0.6 %      Neutrophils, Absolute 8.12 10*3/mm3      Lymphocytes, Absolute 1.50 10*3/mm3      Monocytes, Absolute 1.06 10*3/mm3      Eosinophils, Absolute 0.30 10*3/mm3      Basophils, Absolute 0.05 10*3/mm3      Immature Grans, Absolute 0.07 10*3/mm3      nRBC 0.0 /100 WBC     Troponin [[id number]]  (Normal) Collected: 06/21/21 0048    Specimen: Blood Updated: 06/21/21 0147     Troponin T <0.010 ng/mL     Narrative:      Troponin T Reference Range:  <= 0.03 ng/mL-   Negative for AMI  >0.03 ng/mL-     Abnormal for myocardial necrosis.  Clinicians would have to utilize clinical acumen, EKG, Troponin and serial changes to determine if it is an Acute Myocardial Infarction or myocardial injury due to an underlying chronic condition.       Results may be falsely decreased if patient taking Biotin.             Imaging:  XR Chest 1 View   Final Result          Procedures:  Procedures    Progress  ED Course as of Jun 21 1521   Sun Jun 20, 2021   2214 Normal sinus rhythm with rate of 72. QRS normal. LA interval normal. QTc interval is normal. No ST elevation or depression. No T wave abnormalities. No change when compared to karissa. This EKG was interpreted by me.          ECG 12 Lead [LD]   Mon Jun 21, 2021   1519 WBC(!): 11.10 [LD]      ED Course User Index  [LD] Rossana Almonte MD           HEART Score: 3                  Medical Decision Making:  MDM  Number of Diagnoses or Management Options  Chest pain, unspecified type  Diagnosis management comments: Patient is afebrile and nontoxic appearing.   The patient had an EKG that shows no acute changes. Specifically, there are no ST elevations, t-wave changes of concern, delta waves, or rhythm abnormalities warranting admission. The patient was placed on the cardiac monitor and observed with continuous telemetry. The patient has a chest x-ray that is negative for pneumothorax, pneumonia, and is essentially unremarkable. The patient has had two  negative troponins on blood draw.      The patient is resting comfortably and feels better, is alert and in no distress. The repeat examination is unremarkable and benign. Electrocardiogram shows no signs of acute ischemia and the history, exam, diagnostic testing and current condition did not suggest that this patient is having an acute myocardial infarction, significant arrhythmia, unstable angina, esophageal perforation, pulmonary embolism, aortic dissection, severe pneumonia, sepsis for other significant pathology that would warrant further testing, continued ED treatment, admission, cardiology or other specialist consultation at this point. The vital signs have been stable. Heart score places patient at low risk for major acute cardiac event.     Patient has no RUQ abdominal tenderness. LFTs within normal range.      The patient's condition is stable and appropriate for discharge. The patient will pursue further outpatient evaluation with the primary care physician, or designated physician or cardiologist. The patient has expressed a clear and thorough understanding and agreed to follow-up as instructed. Discussed return precautions, discharge instructions and answered all their questions.           Amount and/or Complexity of Data Reviewed  Clinical lab tests: reviewed  Tests in the radiology section of CPT®: reviewed  Tests in the medicine section of CPT®: reviewed  Review and summarize past medical records: yes  Independent visualization of images, tracings, or specimens: yes    Risk of Complications, Morbidity, and/or Mortality  Presenting problems: moderate  Diagnostic procedures: low  Management options: low    Patient Progress  Patient progress: stable       Final diagnoses:   Chest pain, unspecified type        Disposition:  ED Disposition     ED Disposition Condition Comment    Discharge Stable           Documentation assistance provided by Rossana Almonte MD   acting as scribe for Rossana  MD Gage. Information recorded by the scribe was done at my direction and has been verified and validated by me.        Cesia Segura  06/20/21 2599       Cesia Segura  06/20/21 7263       Rossana Almonte MD  06/21/21 5254

## 2021-07-15 VITALS — OXYGEN SATURATION: 98 % | BODY MASS INDEX: 47.74 KG/M2 | WEIGHT: 315 LBS | HEIGHT: 68 IN | HEART RATE: 71 BPM

## 2021-07-15 VITALS — HEIGHT: 68 IN | WEIGHT: 315 LBS | RESPIRATION RATE: 16 BRPM | BODY MASS INDEX: 47.74 KG/M2

## 2021-07-15 VITALS — BODY MASS INDEX: 47.74 KG/M2 | OXYGEN SATURATION: 97 % | WEIGHT: 315 LBS | HEIGHT: 68 IN | HEART RATE: 76 BPM

## 2021-07-16 ENCOUNTER — OFFICE VISIT (OUTPATIENT)
Dept: ORTHOPEDIC SURGERY | Facility: CLINIC | Age: 51
End: 2021-07-16

## 2021-07-16 VITALS — HEIGHT: 68 IN | BODY MASS INDEX: 47.74 KG/M2 | WEIGHT: 315 LBS | HEART RATE: 73 BPM | OXYGEN SATURATION: 99 %

## 2021-07-16 DIAGNOSIS — M77.8 TENDINITIS OF ELBOW: Primary | ICD-10-CM

## 2021-07-16 PROCEDURE — 99212 OFFICE O/P EST SF 10 MIN: CPT | Performed by: PHYSICIAN ASSISTANT

## 2021-07-16 NOTE — PROGRESS NOTES
"Chief Complaint  Follow-up of the Right Elbow    Subjective          Kevin Bradshaw presents to [facility] for follow up of right elbow pain. Patient has been doing PT and treatment of his elbow since the beginning of April. Patient has partial thickness tears of tendons on both the medial and lateral malleolus. 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 Patient states he has made some progress in PT, but does not feel that he will be able to return to this type of work.     Objective   Vital Signs:   Pulse 73   Ht 172.7 cm (68\")   Wt (!) 153 kg (338 lb)   SpO2 99%   BMI 51.39 kg/m²       Physical Exam  Constitutional:       Appearance: Normal appearance. He is well-developed and normal weight.   HENT:      Head: Normocephalic.      Right Ear: Hearing and external ear normal.      Left Ear: Hearing and external ear normal.      Nose: Nose normal.   Eyes:      Conjunctiva/sclera: Conjunctivae normal.   Cardiovascular:      Rate and Rhythm: Normal rate.   Pulmonary:      Effort: Pulmonary effort is normal.      Breath sounds: No wheezing or rales.   Abdominal:      Palpations: Abdomen is soft.      Tenderness: There is no abdominal tenderness.   Musculoskeletal:      Cervical back: Normal range of motion.   Skin:     Findings: No rash.   Neurological:      Mental Status: He is alert and oriented to person, place, and time.   Psychiatric:         Mood and Affect: Mood and affect normal.         Judgment: Judgment normal.     Ortho Exam  Right elbow: Tenderness of the medial and lateral epicondyles.  Swelling or discoloration.  Full active range of motion of the elbow, pain with range of motion.  Pain against resistance with wrist in flexion and extension.  Good muscle tone in the biceps, triceps and deltoid muscle.  Normal pronation and supination.  Sensation is intact.  Neurovascular intact.  Radial and ulnar pulse are " 2+.      Result Review :            Imaging Results (Most Recent)     None                Assessment and Plan    Problem List Items Addressed This Visit        Musculoskeletal and Injuries    Tendinitis of elbow - Primary          Follow Up   Return if symptoms worsen or fail to improve.  Patient Instructions   Discussed diagnosis and treatment with patient. Recommend continuing anti-inflammatory use and HEP to help with elbow pain.   Recommend no lifting, pushing or pulling with affected extremity > 25 lbs or above the waist to avoid further injury.   Follow up with any changes or concerns.     Patient was given instructions and counseling regarding his condition or for health maintenance advice. Please see specific information pulled into the AVS if appropriate.

## 2021-07-16 NOTE — PATIENT INSTRUCTIONS
Discussed diagnosis and treatment with patient. Recommend continuing anti-inflammatory use and HEP to help with elbow pain.   Recommend no lifting, pushing or pulling with affected extremity > 25 lbs or above the waist to avoid further injury.   Follow up with any changes or concerns.

## 2021-07-22 ENCOUNTER — TELEPHONE (OUTPATIENT)
Dept: ORTHOPEDIC SURGERY | Facility: CLINIC | Age: 51
End: 2021-07-22

## 2021-07-27 ENCOUNTER — HOSPITAL ENCOUNTER (OUTPATIENT)
Dept: CT IMAGING | Facility: HOSPITAL | Age: 51
Discharge: HOME | End: 2021-07-27
Admitting: NURSE PRACTITIONER

## 2021-07-27 DIAGNOSIS — I26.99 PULMONARY EMBOLISM, UNSPECIFIED CHRONICITY, UNSPECIFIED PULMONARY EMBOLISM TYPE, UNSPECIFIED WHETHER ACUTE COR PULMONALE PRESENT (HCC): ICD-10-CM

## 2021-07-27 LAB — CREAT BLDA-MCNC: 0.8 MG/DL

## 2021-07-27 PROCEDURE — 71260 CT THORAX DX C+: CPT

## 2021-07-27 PROCEDURE — 82565 ASSAY OF CREATININE: CPT

## 2021-07-27 PROCEDURE — 0 IOPAMIDOL PER 1 ML: Performed by: NURSE PRACTITIONER

## 2021-07-27 RX ADMIN — IOPAMIDOL 100 ML: 755 INJECTION, SOLUTION INTRAVENOUS at 10:10

## 2021-07-28 PROBLEM — M1A.9XX0 CHRONIC GOUTY ARTHRITIS: Status: ACTIVE | Noted: 2021-07-28

## 2021-07-28 PROBLEM — I10 ESSENTIAL HYPERTENSION: Status: ACTIVE | Noted: 2021-07-28

## 2021-07-28 PROBLEM — M54.50 LOW BACK PAIN: Status: ACTIVE | Noted: 2021-07-28

## 2021-07-28 PROBLEM — K21.9 GASTRO-ESOPHAGEAL REFLUX DISEASE WITHOUT ESOPHAGITIS: Status: ACTIVE | Noted: 2021-07-28

## 2021-07-28 PROBLEM — E29.1 MALE HYPOGONADISM: Status: ACTIVE | Noted: 2021-07-28

## 2021-07-28 PROBLEM — E55.9 VITAMIN D DEFICIENCY: Status: ACTIVE | Noted: 2021-07-28

## 2021-07-28 PROBLEM — G47.33 OBSTRUCTIVE SLEEP APNEA: Status: ACTIVE | Noted: 2021-07-28

## 2021-07-28 PROBLEM — M1A.00X0 CHRONIC GOUTY ARTHRITIS: Status: ACTIVE | Noted: 2021-07-28

## 2021-08-14 ENCOUNTER — LAB (OUTPATIENT)
Dept: LAB | Facility: HOSPITAL | Age: 51
End: 2021-08-14

## 2021-08-14 ENCOUNTER — TRANSCRIBE ORDERS (OUTPATIENT)
Dept: ADMINISTRATIVE | Facility: HOSPITAL | Age: 51
End: 2021-08-14

## 2021-08-14 DIAGNOSIS — I10 ESSENTIAL HYPERTENSION, BENIGN: Primary | ICD-10-CM

## 2021-08-14 DIAGNOSIS — I10 ESSENTIAL HYPERTENSION, BENIGN: ICD-10-CM

## 2021-08-14 DIAGNOSIS — E55.9 VITAMIN D DEFICIENCY: ICD-10-CM

## 2021-08-14 DIAGNOSIS — Z13.6 SCREENING FOR HYPERTENSION: ICD-10-CM

## 2021-08-14 DIAGNOSIS — R73.01 IMPAIRED FASTING GLUCOSE: ICD-10-CM

## 2021-08-14 DIAGNOSIS — E29.1 MALE HYPOGONADISM: ICD-10-CM

## 2021-08-14 LAB
25(OH)D3 SERPL-MCNC: 13.2 NG/ML (ref 30–100)
ALBUMIN SERPL-MCNC: 3.7 G/DL (ref 3.5–5.2)
ALBUMIN/GLOB SERPL: 1.2 G/DL
ALP SERPL-CCNC: 54 U/L (ref 39–117)
ALT SERPL W P-5'-P-CCNC: 24 U/L (ref 1–41)
ANION GAP SERPL CALCULATED.3IONS-SCNC: 12.8 MMOL/L (ref 5–15)
AST SERPL-CCNC: 29 U/L (ref 1–40)
BASOPHILS # BLD AUTO: 0.07 10*3/MM3 (ref 0–0.2)
BASOPHILS NFR BLD AUTO: 0.8 % (ref 0–1.5)
BILIRUB SERPL-MCNC: 0.5 MG/DL (ref 0–1.2)
BUN SERPL-MCNC: 8 MG/DL (ref 6–20)
BUN/CREAT SERPL: 10.1 (ref 7–25)
CALCIUM SPEC-SCNC: 9 MG/DL (ref 8.6–10.5)
CHLORIDE SERPL-SCNC: 99 MMOL/L (ref 98–107)
CHOLEST SERPL-MCNC: 142 MG/DL (ref 0–200)
CO2 SERPL-SCNC: 23.2 MMOL/L (ref 22–29)
CREAT SERPL-MCNC: 0.79 MG/DL (ref 0.76–1.27)
DEPRECATED RDW RBC AUTO: 38.1 FL (ref 37–54)
EOSINOPHIL # BLD AUTO: 0.21 10*3/MM3 (ref 0–0.4)
EOSINOPHIL NFR BLD AUTO: 2.5 % (ref 0.3–6.2)
ERYTHROCYTE [DISTWIDTH] IN BLOOD BY AUTOMATED COUNT: 11.9 % (ref 12.3–15.4)
GFR SERPL CREATININE-BSD FRML MDRD: 104 ML/MIN/1.73
GLOBULIN UR ELPH-MCNC: 3.1 GM/DL
GLUCOSE SERPL-MCNC: 111 MG/DL (ref 65–99)
HCT VFR BLD AUTO: 50.8 % (ref 37.5–51)
HDLC SERPL-MCNC: 37 MG/DL (ref 40–60)
HGB BLD-MCNC: 17.1 G/DL (ref 13–17.7)
IMM GRANULOCYTES # BLD AUTO: 0.05 10*3/MM3 (ref 0–0.05)
IMM GRANULOCYTES NFR BLD AUTO: 0.6 % (ref 0–0.5)
LDLC SERPL CALC-MCNC: 80 MG/DL (ref 0–100)
LDLC/HDLC SERPL: 2.08 {RATIO}
LYMPHOCYTES # BLD AUTO: 1.62 10*3/MM3 (ref 0.7–3.1)
LYMPHOCYTES NFR BLD AUTO: 19.5 % (ref 19.6–45.3)
MCH RBC QN AUTO: 29.5 PG (ref 26.6–33)
MCHC RBC AUTO-ENTMCNC: 33.7 G/DL (ref 31.5–35.7)
MCV RBC AUTO: 87.6 FL (ref 79–97)
MONOCYTES # BLD AUTO: 0.89 10*3/MM3 (ref 0.1–0.9)
MONOCYTES NFR BLD AUTO: 10.7 % (ref 5–12)
NEUTROPHILS NFR BLD AUTO: 5.48 10*3/MM3 (ref 1.7–7)
NEUTROPHILS NFR BLD AUTO: 65.9 % (ref 42.7–76)
NRBC BLD AUTO-RTO: 0 /100 WBC (ref 0–0.2)
PLATELET # BLD AUTO: 330 10*3/MM3 (ref 140–450)
PMV BLD AUTO: 10 FL (ref 6–12)
POTASSIUM SERPL-SCNC: 3.8 MMOL/L (ref 3.5–5.2)
PROT SERPL-MCNC: 6.8 G/DL (ref 6–8.5)
RBC # BLD AUTO: 5.8 10*6/MM3 (ref 4.14–5.8)
SODIUM SERPL-SCNC: 135 MMOL/L (ref 136–145)
T4 FREE SERPL-MCNC: 1.27 NG/DL (ref 0.93–1.7)
TRIGL SERPL-MCNC: 141 MG/DL (ref 0–150)
TSH SERPL DL<=0.05 MIU/L-ACNC: 4.12 UIU/ML (ref 0.27–4.2)
VLDLC SERPL-MCNC: 25 MG/DL (ref 5–40)
WBC # BLD AUTO: 8.32 10*3/MM3 (ref 3.4–10.8)

## 2021-08-14 PROCEDURE — 82306 VITAMIN D 25 HYDROXY: CPT

## 2021-08-14 PROCEDURE — 80053 COMPREHEN METABOLIC PANEL: CPT

## 2021-08-14 PROCEDURE — 85025 COMPLETE CBC W/AUTO DIFF WBC: CPT

## 2021-08-14 PROCEDURE — 36415 COLL VENOUS BLD VENIPUNCTURE: CPT

## 2021-08-14 PROCEDURE — 80061 LIPID PANEL: CPT

## 2021-08-14 PROCEDURE — 84439 ASSAY OF FREE THYROXINE: CPT

## 2021-08-14 PROCEDURE — 84443 ASSAY THYROID STIM HORMONE: CPT

## 2021-08-17 ENCOUNTER — OFFICE VISIT (OUTPATIENT)
Dept: INTERNAL MEDICINE | Facility: CLINIC | Age: 51
End: 2021-08-17

## 2021-08-17 VITALS
HEIGHT: 68 IN | BODY MASS INDEX: 47.74 KG/M2 | SYSTOLIC BLOOD PRESSURE: 134 MMHG | DIASTOLIC BLOOD PRESSURE: 83 MMHG | HEART RATE: 77 BPM | WEIGHT: 315 LBS | OXYGEN SATURATION: 94 % | TEMPERATURE: 98.1 F

## 2021-08-17 DIAGNOSIS — Z86.711 HISTORY OF PULMONARY EMBOLUS (PE): ICD-10-CM

## 2021-08-17 DIAGNOSIS — I10 ESSENTIAL HYPERTENSION: ICD-10-CM

## 2021-08-17 DIAGNOSIS — M1A.00X0 CHRONIC GOUTY ARTHRITIS: ICD-10-CM

## 2021-08-17 DIAGNOSIS — E55.9 VITAMIN D DEFICIENCY: ICD-10-CM

## 2021-08-17 DIAGNOSIS — R73.01 IMPAIRED FASTING GLUCOSE: ICD-10-CM

## 2021-08-17 DIAGNOSIS — I10 ESSENTIAL HYPERTENSION: Primary | ICD-10-CM

## 2021-08-17 DIAGNOSIS — E29.1 MALE HYPOGONADISM: ICD-10-CM

## 2021-08-17 DIAGNOSIS — K21.9 GASTRO-ESOPHAGEAL REFLUX DISEASE WITHOUT ESOPHAGITIS: ICD-10-CM

## 2021-08-17 PROBLEM — F41.9 ANXIETY: Status: ACTIVE | Noted: 2021-08-17

## 2021-08-17 PROCEDURE — 99214 OFFICE O/P EST MOD 30 MIN: CPT | Performed by: NURSE PRACTITIONER

## 2021-08-17 RX ORDER — HYDROCHLOROTHIAZIDE 50 MG/1
50 TABLET ORAL DAILY
Qty: 30 TABLET | Refills: 5 | Status: SHIPPED | OUTPATIENT
Start: 2021-08-17 | End: 2022-03-29 | Stop reason: SDUPTHER

## 2021-08-17 RX ORDER — FOLIC ACID 1 MG/1
1 TABLET ORAL 2 TIMES DAILY
Qty: 60 TABLET | Refills: 5 | Status: SHIPPED | OUTPATIENT
Start: 2021-08-17 | End: 2021-09-16

## 2021-08-17 RX ORDER — ATENOLOL 25 MG/1
25 TABLET ORAL DAILY
Qty: 30 TABLET | Refills: 5 | Status: SHIPPED | OUTPATIENT
Start: 2021-08-17 | End: 2022-02-28

## 2021-08-17 RX ORDER — LISINOPRIL 40 MG/1
40 TABLET ORAL DAILY
Qty: 30 TABLET | Refills: 5 | Status: SHIPPED | OUTPATIENT
Start: 2021-08-17 | End: 2022-03-29 | Stop reason: SDUPTHER

## 2021-08-17 RX ORDER — ALLOPURINOL 100 MG/1
100 TABLET ORAL DAILY
Qty: 30 TABLET | Refills: 5 | Status: SHIPPED | OUTPATIENT
Start: 2021-08-17 | End: 2021-09-16

## 2021-08-17 RX ORDER — ERGOCALCIFEROL 1.25 MG/1
50000 CAPSULE ORAL WEEKLY
Qty: 12 CAPSULE | Refills: 1 | Status: SHIPPED | OUTPATIENT
Start: 2021-08-17 | End: 2021-11-15

## 2021-08-17 RX ORDER — PANTOPRAZOLE SODIUM 40 MG/1
40 TABLET, DELAYED RELEASE ORAL DAILY
Qty: 30 TABLET | Refills: 5 | Status: SHIPPED | OUTPATIENT
Start: 2021-08-17 | End: 2022-05-16

## 2021-08-17 NOTE — PROGRESS NOTES
"Chief Complaint  Hypertension    Subjective    History of Present Illness:  Kevin Bradshaw presents to [facility] for follow-up and labs.  Labs were reviewed.  Fasting glucose is elevated.  The patient has no family history of diabetes.  He denies polydips here or polyuria.  The patient is stable on medications and has no side effects. Recent CT of chest showed clear from clots and fatty liver disease. He uses ETOH. He drinks 2-4 beers daily and on weekends 2-3 more. Following with Dr. Camarena and he reports that he had a negative HIV there. He has had 2 doses covid vaccine.       Objective   Vital Signs:   /83 (BP Location: Left arm, Patient Position: Sitting)   Pulse 77   Temp 98.1 °F (36.7 °C)   Ht 172.7 cm (68\")   Wt (!) 157 kg (345 lb 9.6 oz)   SpO2 94%   BMI 52.55 kg/m²       Physical Exam :  General: Obese well nourished, well hydrated, in no acute distress  HEENT: Conjunctiva clear, no exudate bilateral  Neck: Supple, non-tender, no adenopathy  Skin: Warm and dry  Cardiovascular: S1 S2, regular rate and rhythm, no murmur  Respiratory: Clear to auscultation bilateral, no wheezes, rhonchi, or rales  Chest: Normal shape, no deformity, normal work of breathing  Extremities: 2+ pitting edema lower extremities  Neurological: Alert, conversing normally  Psychological: Good eye contact, mood good, and judgment intact        Assessment and Plan:  Diagnoses and all orders for this visit:    1. Essential hypertension (Primary)  Comments:  Continue current treatment plan.  Orders:  -     Lipid Panel; Future  -     TSH; Future  -     T4, Free; Future  -     Comprehensive Metabolic Panel; Future  -     Vitamin D 25 Hydroxy; Future  -     CBC & Differential; Future  -     folic acid (FOLVITE) 1 MG tablet; Take 1 tablet by mouth 2 (two) times a day for 30 days.  Dispense: 60 tablet; Refill: 5  -     hydroCHLOROthiazide (HYDRODIURIL) 50 MG tablet; Take 1 tablet by mouth Daily " for 30 days.  Dispense: 30 tablet; Refill: 5  -     lisinopril (PRINIVIL,ZESTRIL) 40 MG tablet; Take 1 tablet by mouth Daily for 30 days.  Dispense: 30 tablet; Refill: 5  -     atenolol (TENORMIN) 25 MG tablet; Take 1 tablet by mouth Daily for 30 days.  Dispense: 30 tablet; Refill: 5      2. History of pulmonary embolus (PE)  -     CBC & Differential; Future  -     apixaban (Eliquis) 5 MG tablet tablet; Take 1 tablet by mouth Every 12 (Twelve) Hours for 30 days.  Dispense: 60 tablet; Refill: 5    3. Vitamin D deficiency  Comments:  Education on vitamin D deficiency, treatment, medication, and follow-up.  Orders:  -     Vitamin D 25 Hydroxy; Future  -     vitamin D (ERGOCALCIFEROL) 1.25 MG (10761 UT) capsule capsule; Take 1 capsule by mouth 1 (One) Time Per Week for 90 days.  Dispense: 12 capsule; Refill: 1    4. Impaired fasting glucose  Comments:  The patient was instructed to have a hemoglobin A1c drawn this week and follow-up by phone for results.  Orders:  -     Hemoglobin A1c; Future    5. Gastro-esophageal reflux disease without esophagitis  Comments:  Continue current treatment plan.  Orders:  -     pantoprazole (PROTONIX) 40 MG EC tablet; Take 1 tablet by mouth Daily for 30 days.  Dispense: 30 tablet; Refill: 5    6. Chronic gouty arthritis  Comments:  Continue current treatment plan.  Orders:  -     allopurinol (ZYLOPRIM) 100 MG tablet; Take 1 tablet by mouth Daily for 30 days.  Dispense: 30 tablet; Refill: 5    7. Male hypogonadism  Comments:  Previous referral was made.      Follow Up:  Patient was given instructions and counseling regarding condition. Return in about 6 months (around 2/17/2022).

## 2021-08-21 ENCOUNTER — LAB (OUTPATIENT)
Dept: LAB | Facility: HOSPITAL | Age: 51
End: 2021-08-21

## 2021-08-21 DIAGNOSIS — R73.01 IMPAIRED FASTING GLUCOSE: ICD-10-CM

## 2021-08-21 LAB — HBA1C MFR BLD: 5.59 % (ref 4.8–5.6)

## 2021-08-21 PROCEDURE — 83036 HEMOGLOBIN GLYCOSYLATED A1C: CPT

## 2021-08-21 PROCEDURE — 36415 COLL VENOUS BLD VENIPUNCTURE: CPT

## 2021-08-23 ENCOUNTER — TELEPHONE (OUTPATIENT)
Dept: INTERNAL MEDICINE | Facility: CLINIC | Age: 51
End: 2021-08-23

## 2021-08-23 NOTE — TELEPHONE ENCOUNTER
"PATIENT CALLED ASKING FOR HIS BLOOD WORK RESULTS. I INFORMED HIM I WOULD SEND A MESSAGE ON TO MIKE.  PATIENT BECAME VERY HOSTILE STATING HE NEEDED TO KNOW HIS LABS NOW AND WHY COULDN'T I TELL HIM THIS. I EXPLAINED THE PROCEDURES FOR THIS AND HE STATED HE WOULD JUST STOP IN TOMORROW BECAUSE THAT WAY \"THEY WOULD HAVE TO TELL ME.\"  HE VOICED HIS FRUSTRATION WITH THE WAY THINGS HAVE TO BE DONE NOW. I REITERATED I  WOULD SEE TO IT THAT MIKE GOT THIS MESSAGE.          "

## 2021-08-23 NOTE — TELEPHONE ENCOUNTER
CALLED AND SPOKE WITH PT. HE WAS VERY DEMANDING ABOUT WHAT  WE WILL AND WONT DO FOR HIM. I ADVISED THE PT ABOUT THE MY CHART DELFINO ALSO THAT HE CAN VISIT [facility] [place] FOR A COPY OF HIS LABS THROUGH MEDICAL RECORDS. HE STATES HE WILL HIRE SOMEONE TO HELP FIGURE THIS OUT AS HE STATES [facility] WILL MAKE HIM WAIT 3 MONTH FOR ANYTHING AND HE COULD DIE BEFORE THEN. PT HUNG UP BEFORE THE CALL WAS COMPLETE.

## 2021-09-22 RX ORDER — LANOLIN ALCOHOL/MO/W.PET/CERES
CREAM (GRAM) TOPICAL
Qty: 90 TABLET | Refills: 3 | Status: SHIPPED | OUTPATIENT
Start: 2021-09-22 | End: 2022-03-29

## 2021-11-02 ENCOUNTER — TRANSCRIBE ORDERS (OUTPATIENT)
Dept: PHYSICAL THERAPY | Facility: CLINIC | Age: 51
End: 2021-11-02

## 2021-11-02 ENCOUNTER — TREATMENT (OUTPATIENT)
Dept: PHYSICAL THERAPY | Facility: CLINIC | Age: 51
End: 2021-11-02

## 2021-11-02 DIAGNOSIS — M25.521 RIGHT ELBOW PAIN: ICD-10-CM

## 2021-11-02 DIAGNOSIS — M77.01 MEDIAL EPICONDYLITIS OF RIGHT ELBOW: Primary | ICD-10-CM

## 2021-11-02 PROCEDURE — 97110 THERAPEUTIC EXERCISES: CPT | Performed by: OCCUPATIONAL THERAPIST

## 2021-11-02 PROCEDURE — 97140 MANUAL THERAPY 1/> REGIONS: CPT | Performed by: OCCUPATIONAL THERAPIST

## 2021-11-02 PROCEDURE — 97166 OT EVAL MOD COMPLEX 45 MIN: CPT | Performed by: OCCUPATIONAL THERAPIST

## 2021-11-02 NOTE — PROGRESS NOTES
Outpatient Occupational Therapy Ortho Initial Evaluation    Patient: Kevin Bradshaw   : 1970  Diagnosis/ICD-10 Code:  Medial epicondylitis of right elbow [M77.01]  Referring practitioner: Ranjit Rosenbaum MD  Date of Initial Visit: 2021  Today's Date: 2021  Patient seen for 1 sessions               Subjective Questionnaire: QuickDASH: 23      Subjective Evaluation    History of Present Illness  Date of onset: 3/21/2021  Mechanism of injury: Loading a wheelchair into a mini van 3/21 and felt a pain. Went to [facility], did therapy,  Referred to Dr. Rosenbaum. Has worn braces in past.  Referred for Eccentric stretching and strengthening, Nirschl Exercises, therabar, modalities.       Patient Occupation: Work for [employer] -    Precautions and Work Restrictions: limited to 25# Quality of life: excellent    Pain  Current pain rating: 3  Aggravating factors: lifting and repetitive movement  Progression: no change    Social Support  Lives with: adult children    Hand dominance: right    Diagnostic Tests  MRI studies: abnormal    Treatments  Previous treatment: injection treatment (occupational therapy)  Patient Goals  Patient goals for therapy: decreased pain  Patient goal: 60-70% better         Past Medical hx: no significant medical hx reported when asked, noted later that he has arthritis, degenerative disc disease.  Objective          Neurological Testing     Sensation     Elbow     Right Elbow   Intact: light touch    Active Range of Motion     Right Elbow   Forearm supination: 60 degrees   Forearm pronation: 70 degrees     Additional Active Range of Motion Details  Elbow flexed wrist flexed    75        Wrist extended  70  Elbow extended wrist flexed  70     Wrist extended   55    Strength/Myotome Testing     Additional Strength Details  Elbow flexed      R  60  L 70  Elbow extended R  70   L 65          Assessment & Plan     Assessment  Impairments: abnormal coordination, abnormal muscle firing, abnormal  or restricted ROM, activity intolerance, impaired physical strength, lacks appropriate home exercise program, pain with function, safety issue and weight-bearing intolerance  Assessment details: Pt reports pain with all functional tasks.  Pt point tender in medial epicondyle greater than Lateral epicondyle on R UE.  Pt having difficulty with lifting, pushing, pulling gripping.  Functional Limitations: carrying objects, lifting, pulling, pushing, reaching behind back, reaching overhead and unable to perform repetitive tasks  Goals  Plan Goals: 1. The patient complains of pain in the R elbow                    LTG 1: 12 weeks:  The patient will report a pain rating of 0/10 or better in order to improve sleep quality and tolerance to performance of activities of daily living.                                  STATUS:  New                  STG 1a: 4weeks:  The patient will report a pain rating of 2/10 or better.                                   STATUS:  New  2. The patient has limited ROM of the wrist ext with elbow ext                  LTG 2: 12 weeks:  The patient will demonstrate 65 degrees of wrist ext to allow the patient to push door open.                                  STATUS:  New                   STG 2a: 4 weeks:  The patient will demonstrate 60 degrees of wrist ext.                                  STATUS:  New                             3. The patient has limited strength of the R UE.                  LTG 3: 12 weeks:  The patient will demonstrate 65# in order to .                                  STATUS:  New                  STG 3a: 4 weeks:  The patient will demonstrate tolerance to light strengthening without adverse reaction.                                  STATUS:  New  4. Carrying, Moving, and Handling Objects Functional Limitation                                   LTG 4: 12 weeks:  The patient will demonstrate 1-19% limitation by achieving a score of 15 on the Quick  DASH.                                  STATUS:  New                  STG 4a: 4 weeks:  The patient will demonstrate 20-39% limitation by achieving a score of 20 on the Quick DASH.                                    STATUS:  New                    TREATMENT: Orthotic fabrication/fitting/management and training, Manual therapy, therapeutic exercise, home exercise instruction, and modalities as needed to include: electrical stimulation, ultrasound, moist heat, paraffin, fluidotherapy and ice.        Plan  Planned modality interventions: TENS, thermotherapy (hydrocollator packs), thermotherapy (paraffin bath), ultrasound and electrical stimulation/Russian stimulation  Other planned modality interventions: fluidotherapy  Planned therapy interventions: manual therapy, ADL retraining, motor coordination training, neuromuscular re-education, soft tissue mobilization, fine motor coordination training, body mechanics training, balance/weight-bearing training, functional ROM exercises, flexibility, spinal/joint mobilization, strengthening, stretching, therapeutic activities, IADL retraining, joint mobilization and home exercise program  Frequency: 2x week  Duration in weeks: 12  Treatment plan discussed with: patient        Patient is indicated for skilled occupational therapy services.    History # of Personal Factors and/or Comorbidities: MODERATE (1-2)  Examination of Body System(s): # of elements: MODERATE (3)  Clinical Presentation: EVOLVING  Clinical Decision Making: MODERATE     Evaluation:  Low Complexity:    0     mins  53895;  Mod Complexity:    15     mins  32849;  High Complexity:    0     mins  48043;    Timed:  Manual Therapy:    15     mins  95335;  Therapeutic Exercise:    15     mins  53225;  Therapeutic Activity:    0     mins  38445;     Neuromuscular Tamanna:    0    mins  69791;    Ultrasound:     0     mins  55050;    Electrical Stimulation:    0     mins  62714;    Untimed:  Electrical Stimulation:    0      mins  48910 ( );  Fluidotherapy:        0    mins  64921  Paraffin:                          0    mins  31896    Timed Treatment:   30   mins   Total Treatment:     45   mins      OT SIGNATURE: ANNABELLE Campbell, OTR/L, CHT     Electronically signed    [place] LICENSE: [id number]   DATE TREATMENT INITIATED: 12/9/2021    Initial Certification  Certification Period: 12/9/2021 thru 3/8/2022  I certify that the therapy services are furnished while this patient is under my care.  The services outlined above are required by this patient, and will be reviewed every 90 days.     PHYSICIAN: Referring, Self      DATE:     Please sign and return via fax to [phone]   Thank you, [facility] Occupational Therapy.

## 2021-11-16 ENCOUNTER — OFFICE VISIT (OUTPATIENT)
Dept: PULMONOLOGY | Facility: CLINIC | Age: 51
End: 2021-11-16

## 2021-11-16 ENCOUNTER — TREATMENT (OUTPATIENT)
Dept: PHYSICAL THERAPY | Facility: CLINIC | Age: 51
End: 2021-11-16

## 2021-11-16 VITALS
BODY MASS INDEX: 47.74 KG/M2 | OXYGEN SATURATION: 97 % | SYSTOLIC BLOOD PRESSURE: 125 MMHG | WEIGHT: 315 LBS | TEMPERATURE: 97.1 F | HEIGHT: 68 IN | DIASTOLIC BLOOD PRESSURE: 55 MMHG | HEART RATE: 69 BPM | RESPIRATION RATE: 18 BRPM

## 2021-11-16 DIAGNOSIS — M25.521 RIGHT ELBOW PAIN: ICD-10-CM

## 2021-11-16 DIAGNOSIS — Z86.711 HISTORY OF PULMONARY EMBOLUS (PE): Primary | ICD-10-CM

## 2021-11-16 DIAGNOSIS — M77.01 MEDIAL EPICONDYLITIS OF RIGHT ELBOW: Primary | ICD-10-CM

## 2021-11-16 DIAGNOSIS — G47.33 OBSTRUCTIVE SLEEP APNEA: ICD-10-CM

## 2021-11-16 PROCEDURE — 97530 THERAPEUTIC ACTIVITIES: CPT | Performed by: OCCUPATIONAL THERAPIST

## 2021-11-16 PROCEDURE — 99203 OFFICE O/P NEW LOW 30 MIN: CPT | Performed by: INTERNAL MEDICINE

## 2021-11-16 PROCEDURE — 97140 MANUAL THERAPY 1/> REGIONS: CPT | Performed by: OCCUPATIONAL THERAPIST

## 2021-11-16 PROCEDURE — 97110 THERAPEUTIC EXERCISES: CPT | Performed by: OCCUPATIONAL THERAPIST

## 2021-11-16 NOTE — PROGRESS NOTES
"Chief Complaint  Sleep Apnea and Follow-up (Chest ct and Echo results)    Subjective          Kevin Bradshaw presents to [facility]  History of Present Illness  Very pleasant 50-year-old male history of obstructive sleep apnea, history of PE which was unprovoked approximately 7 months ago here today for follow-up.  Patient doing well recent CT scan showed complete resolution of the clot, has no evidence of chronic thromboembolic pulmonary hypertension.  No significant lower extremity edema no shortness of breath at this time does all his ADLs without having any difficulties denies have any chest pains.  Objective   Vital Signs:   /55 (BP Location: Left arm, Patient Position: Sitting, Cuff Size: Large Adult)   Pulse 69   Temp 97.1 °F (36.2 °C) (Temporal)   Resp 18   Ht 172.7 cm (68\")   Wt (!) 160 kg (352 lb 12.8 oz)   SpO2 97% Comment: room air  BMI 53.64 kg/m²     Physical Exam   Vital Signs Reviewed  General WDWN, Alert, NAD.    HEENT:  PERRL, EOMI.  OP, nares clear, no sinus tenderness  Neck:  Supple, no JVD, no thyromegaly  Lymph: no axillary, cervical, supraclavicular lymphadenopathy noted bilaterally  Chest:  good aeration, clear to auscultation bilaterally, tympanic to percussion bilaterally, no work of breathing noted  CV: RRR, no MGR, pulses 2+, equal.  Abd:  Soft, NT, ND, + BS, no HSM  EXT:  no clubbing, no cyanosis, no edema, no joint tenderness  Neuro:  A&Ox3, CN grossly intact, no focal deficits.  Skin: No rashes or lesions noted  Result Review :                 Assessment and Plan    Diagnoses and all orders for this visit:    1. History of pulmonary embolus (PE) (Primary)  Assessment & Plan:  Patient is status post 6 months of anticoagulation    Patient did have significant clot burden and no identifiable cause of blood clot    Discussed with the patient at this time that we can either discontinue anticoagulation or given his numerous " high-volume blood clot which was unprovoked the other alternative would be to continue lifelong anticoagulation and we can consider reducing the dose of Eliquis to 2.5 twice daily    Patient voices understanding and is very concerned about the possibility of developing a repeat blood clot    Also seeing hematology oncology who is recommended continuing anticoagulation as long as I am okay with it    Discussed with the patient shared decision-making used to determine if we will continue anticoagulation indefinitely      2. Obstructive sleep apnea  Assessment & Plan:  Symptoms seem to be controlled at this time    Continue PAP therapy at current settings      I spent 20 minutes caring for Kevin on this date of service. This time includes time spent by me in the following activities:preparing for the visit, reviewing tests, obtaining and/or reviewing a separately obtained history, performing a medically appropriate examination and/or evaluation  and documenting information in the medical record  Follow Up   Return in about 1 year (around 11/16/2022).  Patient was given instructions and counseling regarding his condition or for health maintenance advice. Please see specific information pulled into the AVS if appropriate.

## 2021-11-16 NOTE — PROGRESS NOTES
Occupational Therapy Daily Treatment Note      Patient: Kevin Bradshaw   : 1970  Referring practitioner: Ranjit Rosenbaum MD  Date of Initial Visit: Type: THERAPY  Noted: 2021  Today's Date: 2021  Patient seen for 2 sessions    ICD-10-CM ICD-9-CM   1. Medial epicondylitis of right elbow  M77.01 726.31   2. Right elbow pain  M25.521 719.42          Kevin Bradshaw reports It has been better and it has been worse 4/10    Objective   See Exercise, Manual, and Modality Logs for complete treatment.   Pt able to progress extensor stretch from position 1 to 2.  Kinesiotape applied to inhibit extensors and space correction over Lateral epicondyle.     Assessment/Plan  Pt had relief after ASTYM last visit.  Pt needs education on what to expect and progression of healing.        Cont per POC           Timed:  Manual Therapy:    10     mins  29983;  Therapeutic Exercise:    10     mins  08329;  Therapeutic Activity:    10     mins  76201;     Neuromuscular Tamanna:    0    mins  18450;    Ultrasound:     0     mins  89756;    Electrical Stimulation:    0     mins  05041;    Untimed:  Electrical Stimulation:    0     mins  78937 ( );  Fluidotherapy:        0    mins  60750  Paraffin:                          0    mins  76233    Timed Treatment:   30   mins   Total Treatment:     30   mins    OT SIGNATURE: ANNABELLE Campbell, OTR/L, CHT     Electronically signed    [place] LICENSE: [id number]

## 2021-11-16 NOTE — ASSESSMENT & PLAN NOTE
Patient is status post 6 months of anticoagulation    Patient did have significant clot burden and no identifiable cause of blood clot    Discussed with the patient at this time that we can either discontinue anticoagulation or given his numerous high-volume blood clot which was unprovoked the other alternative would be to continue lifelong anticoagulation and we can consider reducing the dose of Eliquis to 2.5 twice daily    Patient voices understanding and is very concerned about the possibility of developing a repeat blood clot    Also seeing hematology oncology who is recommended continuing anticoagulation as long as I am okay with it    Discussed with the patient shared decision-making used to determine if we will continue anticoagulation indefinitely

## 2021-11-23 ENCOUNTER — TREATMENT (OUTPATIENT)
Dept: PHYSICAL THERAPY | Facility: CLINIC | Age: 51
End: 2021-11-23

## 2021-11-23 DIAGNOSIS — M77.01 MEDIAL EPICONDYLITIS OF RIGHT ELBOW: Primary | ICD-10-CM

## 2021-11-23 DIAGNOSIS — M25.521 RIGHT ELBOW PAIN: ICD-10-CM

## 2021-11-23 PROCEDURE — 97110 THERAPEUTIC EXERCISES: CPT | Performed by: OCCUPATIONAL THERAPIST

## 2021-11-23 PROCEDURE — 97140 MANUAL THERAPY 1/> REGIONS: CPT | Performed by: OCCUPATIONAL THERAPIST

## 2021-11-23 PROCEDURE — 97530 THERAPEUTIC ACTIVITIES: CPT | Performed by: OCCUPATIONAL THERAPIST

## 2021-11-23 NOTE — PROGRESS NOTES
Occupational Therapy Daily Treatment Note      Patient: Kevin Bradshaw   : 1970  Referring practitioner: Ranjit Rosenbaum MD  Date of Initial Visit: Type: THERAPY  Noted: 2021  Today's Date: 2021  Patient seen for 3 sessions    ICD-10-CM ICD-9-CM   1. Medial epicondylitis of right elbow  M77.01 726.31   2. Right elbow pain  M25.521 719.42          Kevin Bradshaw reports I am about the same.        Objective   See Exercise, Manual, and Modality Logs for complete treatment.   Pt had decreased overall point tenderness over LE and triceps insertion.  Pt tolerated ASTYM well this date.     Assessment/Plan  Pt educated on need to do 2x/wk per plan to increase effectiveness of manual treatment and help progress functional improvements.   Cont per POC           Timed:  Manual Therapy:    10     mins  57286;  Therapeutic Exercise:    10     mins  23505;  Therapeutic Activity:    10     mins  18513;     Neuromuscular Tamanna:    0    mins  36082;    Ultrasound:     0     mins  41029;    Electrical Stimulation:    0     mins  88822;    Untimed:  Electrical Stimulation:    0     mins  65568 ( );  Fluidotherapy:        0    mins  66624  Paraffin:                          0    mins  60671    Timed Treatment:   30   mins   Total Treatment:     30   mins    OT SIGNATURE: ANNABELLE Campbell, OTR/L, CHT     Electronically signed    [place] LICENSE: [id number]

## 2021-11-30 ENCOUNTER — TELEPHONE (OUTPATIENT)
Dept: PULMONOLOGY | Facility: CLINIC | Age: 51
End: 2021-11-30

## 2021-11-30 ENCOUNTER — TREATMENT (OUTPATIENT)
Dept: PHYSICAL THERAPY | Facility: CLINIC | Age: 51
End: 2021-11-30

## 2021-11-30 DIAGNOSIS — M25.521 RIGHT ELBOW PAIN: ICD-10-CM

## 2021-11-30 DIAGNOSIS — M77.01 MEDIAL EPICONDYLITIS OF RIGHT ELBOW: Primary | ICD-10-CM

## 2021-11-30 PROCEDURE — 97110 THERAPEUTIC EXERCISES: CPT | Performed by: OCCUPATIONAL THERAPIST

## 2021-11-30 PROCEDURE — 97530 THERAPEUTIC ACTIVITIES: CPT | Performed by: OCCUPATIONAL THERAPIST

## 2021-11-30 NOTE — TELEPHONE ENCOUNTER
PT is needing a letter from  stating that he can get massages again due to his blood clot. He stated that when he was in last  stated he would give him the clearance letter. PT will come back to pick it up. If you have any questions please contact PT.   Yes, the patient is being discharged from [facility]...

## 2021-11-30 NOTE — PROGRESS NOTES
Re-Assessment / Re-Certification      Patient: Kevin Bradshaw   : 1970  Diagnosis/ICD-10 Code:  Medial epicondylitis of right elbow [M77.01]  Referring practitioner: Ranjit Rosenbaum MD  Date of Initial Visit: Type: THERAPY  Noted: 2021  Today's Date: 2021  Patient seen for 4 sessions      Subjective:   Kevin Bradshaw reports: Yesterday I was lifting a toddler in a car seat out of the van and felt a pull.  Now I am feeling it more.  Subjective Questionnaire: QuickDASH: 20  Clinical Progress: unchanged  Home Program Compliance: Yes  Treatment has included: therapeutic exercise, neuromuscular re-education, manual therapy, therapeutic activity and electrical stimulation    Objective   Active Range of Motion      Right Elbow   Forearm supination: 55 degrees   Forearm pronation: 75 degrees     Additional Active Range of Motion Details  Elbow flexed wrist flexed    60        Wrist extended  60  Elbow extended wrist flexed  50     Wrist extended   65     Strength/Myotome Testing     Additional Strength Details  Elbow flexed      R  60    Elbow extended R  63          Assessment/Plan  Pt has attended 4/7 visits.  Pt continues to lift and  with work tasks, and has recently reported feeling a strain at the elbow with lifting.      Visit Diagnoses:    ICD-10-CM ICD-9-CM   1. Medial epicondylitis of right elbow  M77.01 726.31   2. Right elbow pain  M25.521 719.42       Progress toward previous goals: Not Met    Plan Goals: 1. The patient complains of pain in the R elbow                                          LTG 1: 12 weeks:  The patient will report a pain rating of 0/10 or better in order to improve sleep quality and tolerance to performance of activities of daily living.                                  STATUS:  NOT MET                  STG 1a: 4weeks:  The patient will report a pain rating of 2/10 or better.                                   STATUS:  NOT MET  2. The patient has limited ROM of the wrist ext  with elbow ext                  LTG 2: 12 weeks:  The patient will demonstrate 65 degrees of wrist ext to allow the patient to push door open.                                  STATUS:  NOT MET                  STG 2a: 4 weeks:  The patient will demonstrate 60 degrees of wrist ext.                                  STATUS:  NOT MET                             3. The patient has limited strength of the R UE.                  LTG 3: 12 weeks:  The patient will demonstrate 65# in order to .                                  STATUS:  NOT MET                  STG 3a: 4 weeks:  The patient will demonstrate tolerance to light strengthening without adverse reaction.                                  STATUS:  NOT MET  4. Carrying, Moving, and Handling Objects Functional Limitation                                   LTG 4: 12 weeks:  The patient will demonstrate 1-19% limitation by achieving a score of 15 on the Quick DASH.                                  STATUS:  NOT MET                  STG 4a: 4 weeks:  The patient will demonstrate 20-39% limitation by achieving a score of 20 on the Quick DASH.                                    STATUS:  NOT MET                        Recommendations: Referred back to MD for further assessment.   Timeframe: 1 month  Prognosis to achieve goals: poor      OT SIGNATURE: Lois Oleary OTD, OTR/L, CHT     Electronically signed    [place] LICENSE: [id number]     Timed:  Manual Therapy:    0     mins  02303;  Therapeutic Exercise:    25     mins  59030;  Therapeutic Activity:    10     mins  19405;     Neuromuscular Tamanna:    0    mins  91028;    Ultrasound:     0     mins  04676;    Electrical Stimulation:    0     mins  47156;    Untimed:  Electrical Stimulation:    0     mins  71053 ( );  Fluidotherapy:        0    mins  05929  Paraffin:                          0    mins  34674    Timed Treatment:   35   mins   Total Treatment:     35   mins

## 2021-12-02 ENCOUNTER — TREATMENT (OUTPATIENT)
Dept: PHYSICAL THERAPY | Facility: CLINIC | Age: 51
End: 2021-12-02

## 2021-12-02 DIAGNOSIS — M77.01 MEDIAL EPICONDYLITIS OF RIGHT ELBOW: Primary | ICD-10-CM

## 2021-12-02 DIAGNOSIS — M25.521 RIGHT ELBOW PAIN: ICD-10-CM

## 2021-12-02 PROCEDURE — 97110 THERAPEUTIC EXERCISES: CPT | Performed by: OCCUPATIONAL THERAPIST

## 2021-12-02 PROCEDURE — 97530 THERAPEUTIC ACTIVITIES: CPT | Performed by: OCCUPATIONAL THERAPIST

## 2021-12-02 PROCEDURE — 97112 NEUROMUSCULAR REEDUCATION: CPT | Performed by: OCCUPATIONAL THERAPIST

## 2021-12-02 NOTE — PROGRESS NOTES
Occupational Therapy Daily Treatment Note      Patient: Kevin Bradshaw   : 1970  Referring practitioner: Ranjit Rosenbaum MD  Date of Initial Visit: Type: THERAPY  Noted: 2021  Today's Date: 2021  Patient seen for 5 sessions    ICD-10-CM ICD-9-CM   1. Medial epicondylitis of right elbow  M77.01 726.31   2. Right elbow pain  M25.521 719.42          Kevin Bradshaw reports No changes, it is about the same.       Objective   See Exercise, Manual, and Modality Logs for complete treatment.   Pt continues to have in elbow with all functional activity per pt report.  Pt observed lifting plastic chair without difficulty.     Assessment/Plan   Pt tolerated adding weight back this date.  Pt reports feeling WB more this date.          Cont per POC           Timed:  Manual Therapy:    0     mins  77748;  Therapeutic Exercise:    10     mins  67173;  Therapeutic Activity:    10     mins  49782;     Neuromuscular Tamanna:    10    mins  07713;    Ultrasound:     0     mins  89607;    Electrical Stimulation:    0     mins  53741;    Untimed:  Electrical Stimulation:    0     mins  63904 ( );  Fluidotherapy:        0    mins  24564  Paraffin:                          0    mins  11803    Timed Treatment:   30   mins   Total Treatment:     30   mins    OT SIGNATURE: ANNABELLE Campbell, OTR/L, CHT     Electronically signed    [place] LICENSE: [id number]

## 2021-12-09 ENCOUNTER — TREATMENT (OUTPATIENT)
Dept: PHYSICAL THERAPY | Facility: CLINIC | Age: 51
End: 2021-12-09

## 2021-12-09 DIAGNOSIS — M77.01 MEDIAL EPICONDYLITIS OF RIGHT ELBOW: Primary | ICD-10-CM

## 2021-12-09 DIAGNOSIS — M25.521 RIGHT ELBOW PAIN: ICD-10-CM

## 2021-12-09 PROCEDURE — 97530 THERAPEUTIC ACTIVITIES: CPT | Performed by: OCCUPATIONAL THERAPIST

## 2021-12-09 PROCEDURE — 97112 NEUROMUSCULAR REEDUCATION: CPT | Performed by: OCCUPATIONAL THERAPIST

## 2021-12-09 PROCEDURE — 97110 THERAPEUTIC EXERCISES: CPT | Performed by: OCCUPATIONAL THERAPIST

## 2021-12-09 NOTE — PROGRESS NOTES
Occupational Therapy Daily Treatment Note      Patient: Kevin Bradshaw   : 1970  Referring practitioner: Ranjit Rosenbaum MD  Date of Initial Visit: Type: THERAPY  Noted: 2021  Today's Date: 2021  Patient seen for 6 sessions    ICD-10-CM ICD-9-CM   1. Medial epicondylitis of right elbow  M77.01 726.31   2. Right elbow pain  M25.521 719.42          Kevin Bradshaw reports It is about a 3/10.  Pt reports he has asked the doctor for a strength test because it isn't getting better.        Objective   See Exercise, Manual, and Modality Logs for complete treatment.   Pt tolerated treatment fair this date.      Assessment/Plan  Pt is fatigued after treatment in R elbow.    Cont per POC           Timed:  Manual Therapy:    0     mins  49893;  Therapeutic Exercise:    10     mins  71739;  Therapeutic Activity:    10     mins  72422;     Neuromuscular Tamanna:    10    mins  37314;    Ultrasound:     0     mins  32391;    Electrical Stimulation:    0     mins  86989;    Untimed:  Electrical Stimulation:    0     mins  21364 ( );  Fluidotherapy:        0    mins  61771  Paraffin:                          0    mins  32977    Timed Treatment:   30   mins   Total Treatment:     30   mins    OT SIGNATURE: ANNABELLE Campbell, OTR/L, CHT     Electronically signed    [place] LICENSE: [id number]

## 2021-12-14 ENCOUNTER — TREATMENT (OUTPATIENT)
Dept: PHYSICAL THERAPY | Facility: CLINIC | Age: 51
End: 2021-12-14

## 2021-12-14 DIAGNOSIS — M25.521 RIGHT ELBOW PAIN: Primary | ICD-10-CM

## 2021-12-14 DIAGNOSIS — M77.01 MEDIAL EPICONDYLITIS OF RIGHT ELBOW: ICD-10-CM

## 2021-12-14 PROCEDURE — 97110 THERAPEUTIC EXERCISES: CPT | Performed by: OCCUPATIONAL THERAPIST

## 2021-12-14 PROCEDURE — 97530 THERAPEUTIC ACTIVITIES: CPT | Performed by: OCCUPATIONAL THERAPIST

## 2021-12-14 PROCEDURE — 97112 NEUROMUSCULAR REEDUCATION: CPT | Performed by: OCCUPATIONAL THERAPIST

## 2021-12-14 NOTE — PROGRESS NOTES
Occupational Therapy Daily Treatment Note      Patient: Kevin Bradshaw   : 1970  Referring practitioner: Ranjit Rosenbaum MD  Date of Initial Visit: Type: THERAPY  Noted: 2021  Today's Date: 2021  Patient seen for 7 sessions    ICD-10-CM ICD-9-CM   1. Right elbow pain  M25.521 719.42   2. Medial epicondylitis of right elbow  M77.01 726.31          Kevin Bradshaw reports I am feeling about 3/10 pain today.       Objective   See Exercise, Manual, and Modality Logs for complete treatment.       Assessment/Plan   Pt tolerated strengthening, but still having discomfort in medial epicondyle and lateral epicondyle of R elbow.    Cont per POC           Timed:  Manual Therapy:    0     mins  88581;  Therapeutic Exercise:    10     mins  31196;  Therapeutic Activity:    10     mins  08708;     Neuromuscular Tamanna:    10    mins  05375;    Ultrasound:     0     mins  78738;    Electrical Stimulation:    0     mins  54378;    Untimed:  Electrical Stimulation:    0     mins  96369 ( );  Fluidotherapy:        0    mins  66113  Paraffin:                          0    mins  09444    Timed Treatment:   30   mins   Total Treatment:     30   mins    OT SIGNATURE: ANNABELLE Campbell, OTR/L, CHT     Electronically signed    [place] LICENSE: [id number]

## 2021-12-16 ENCOUNTER — TREATMENT (OUTPATIENT)
Dept: PHYSICAL THERAPY | Facility: CLINIC | Age: 51
End: 2021-12-16

## 2021-12-16 DIAGNOSIS — M77.01 MEDIAL EPICONDYLITIS OF RIGHT ELBOW: ICD-10-CM

## 2021-12-16 DIAGNOSIS — M25.521 RIGHT ELBOW PAIN: Primary | ICD-10-CM

## 2021-12-16 PROCEDURE — 97110 THERAPEUTIC EXERCISES: CPT | Performed by: OCCUPATIONAL THERAPIST

## 2021-12-16 PROCEDURE — 97112 NEUROMUSCULAR REEDUCATION: CPT | Performed by: OCCUPATIONAL THERAPIST

## 2021-12-16 PROCEDURE — 97530 THERAPEUTIC ACTIVITIES: CPT | Performed by: OCCUPATIONAL THERAPIST

## 2021-12-16 NOTE — PROGRESS NOTES
Occupational Therapy Daily Treatment Note      Patient: Kevin Bradshaw   : 1970  Referring practitioner: Ranjit Rosenbaum MD  Date of Initial Visit: Type: THERAPY  Noted: 2021  Today's Date: 2021  Patient seen for 8 sessions    ICD-10-CM ICD-9-CM   1. Right elbow pain  M25.521 719.42   2. Medial epicondylitis of right elbow  M77.01 726.31          Kevin Bradshaw reports I am about a 3/10.  It is about the same.  We can make this my last appointment.      Objective   See Exercise, Manual, and Modality Logs for complete treatment.   Active Range of Motion      Right Elbow   Forearm supination: 60 degrees   Forearm pronation: 90 degrees     Additional Active Range of Motion Details  Elbow flexed wrist flexed    65        Wrist extended  65  Elbow extended wrist flexed  65     Wrist extended   65     Strength/Myotome Testing     Additional Strength Details  Elbow flexed      R  55    Elbow extended R  60         Quick DASH: 31    Assessment/Plan    Plan Goals: 1. The patient complains of pain in the R elbow                                          LTG 1: 12 weeks:  The patient will report a pain rating of 0/10 or better in order to improve sleep quality and tolerance to performance of activities of daily living.                                  STATUS:  NOT MET                  STG 1a: 4weeks:  The patient will report a pain rating of 2/10 or better.                                   STATUS:  NOT MET  2. The patient has limited ROM of the wrist ext with elbow ext                  LTG 2: 12 weeks:  The patient will demonstrate 65 degrees of wrist ext to allow the patient to push door open.                                  STATUS:  MET                  STG 2a: 4 weeks:  The patient will demonstrate 60 degrees of wrist ext.                                  STATUS:  MET                             3. The patient has limited strength of the R UE.                  LTG 3: 12 weeks:  The patient will demonstrate  65# in order to .                                  STATUS:  NOT MET                  STG 3a: 4 weeks:  The patient will demonstrate tolerance to light strengthening without adverse reaction.                                  STATUS:  MET  4. Carrying, Moving, and Handling Objects Functional Limitation                                   LTG 4: 12 weeks:  The patient will demonstrate 1-19% limitation by achieving a score of 15 on the Quick DASH.                                  STATUS:  NOT MET                  STG 4a: 4 weeks:  The patient will demonstrate 20-39% limitation by achieving a score of 20 on the Quick DASH.                                    STATUS:  NOT MET    D/C to HEP.           Timed:  Manual Therapy:    0     mins  04682;  Therapeutic Exercise:    10     mins  70696;  Therapeutic Activity:    10     mins  79423;     Neuromuscular Tamanna:    10    mins  57270;    Ultrasound:     0     mins  34329;    Electrical Stimulation:    0     mins  14506;    Untimed:  Electrical Stimulation:    0     mins  46149 ( );  Fluidotherapy:        0    mins  85009  Paraffin:                          0    mins  87623    Timed Treatment:   30   mins   Total Treatment:     30   mins    OT SIGNATURE: ANNABELLE Campbell, OTR/L, CHT     Electronically signed    [place] LICENSE: [id number]

## 2021-12-30 ENCOUNTER — DOCUMENTATION (OUTPATIENT)
Dept: PHYSICAL THERAPY | Facility: CLINIC | Age: 51
End: 2021-12-30

## 2022-01-17 ENCOUNTER — OFFICE VISIT (OUTPATIENT)
Dept: INTERNAL MEDICINE | Facility: CLINIC | Age: 52
End: 2022-01-17

## 2022-01-17 ENCOUNTER — LAB (OUTPATIENT)
Dept: LAB | Facility: HOSPITAL | Age: 52
End: 2022-01-17

## 2022-01-17 VITALS
OXYGEN SATURATION: 97 % | TEMPERATURE: 99.6 F | BODY MASS INDEX: 61.84 KG/M2 | SYSTOLIC BLOOD PRESSURE: 137 MMHG | WEIGHT: 315 LBS | DIASTOLIC BLOOD PRESSURE: 84 MMHG | HEIGHT: 60 IN

## 2022-01-17 DIAGNOSIS — J06.9 ACUTE URI: Primary | ICD-10-CM

## 2022-01-17 DIAGNOSIS — Z11.59 NEED FOR HEPATITIS C SCREENING TEST: ICD-10-CM

## 2022-01-17 DIAGNOSIS — J06.9 ACUTE URI: ICD-10-CM

## 2022-01-17 LAB
EXPIRATION DATE: NORMAL
FLUAV AG NPH QL: NEGATIVE
FLUBV AG NPH QL: NEGATIVE
INTERNAL CONTROL: NORMAL
Lab: NORMAL

## 2022-01-17 PROCEDURE — 99213 OFFICE O/P EST LOW 20 MIN: CPT | Performed by: NURSE PRACTITIONER

## 2022-01-17 PROCEDURE — U0004 COV-19 TEST NON-CDC HGH THRU: HCPCS

## 2022-01-17 PROCEDURE — 87804 INFLUENZA ASSAY W/OPTIC: CPT | Performed by: NURSE PRACTITIONER

## 2022-01-17 RX ORDER — AZITHROMYCIN 250 MG/1
TABLET, FILM COATED ORAL
Qty: 6 TABLET | Refills: 0 | Status: SHIPPED | OUTPATIENT
Start: 2022-01-17 | End: 2022-03-09

## 2022-01-17 RX ORDER — PREDNISONE 20 MG/1
TABLET ORAL
Qty: 10 TABLET | Refills: 0 | Status: SHIPPED | OUTPATIENT
Start: 2022-01-17 | End: 2022-03-29

## 2022-01-17 RX ORDER — BENZONATATE 200 MG/1
200 CAPSULE ORAL 3 TIMES DAILY PRN
Qty: 30 CAPSULE | Refills: 0 | Status: SHIPPED | OUTPATIENT
Start: 2022-01-17 | End: 2022-03-09

## 2022-01-17 NOTE — PATIENT INSTRUCTIONS

## 2022-01-17 NOTE — PROGRESS NOTES
Chief Complaint  Nasal Congestion (yesterday. coughing up a little bit, says he feels it in his nose are ans down in chest.)  Subjective        History of Present Illness  Kevin ENMA Bradshaw is a 51 y.o. male who presents to [facility] for complaint of cough and congestion for 3 days. Cough is productive clear. Positive for  appetite,  low-grade fever and malaise. He has not had any treatment. He has had 2 doses of Covid vaccine. He must have a negative Covid test to return to work.    Review of Systems:  Constitutional: Denies loss of taste/smell.   HEENT: Denies headache, sinus pain, earache, or sore throat.   Respiratory: He denies shortness of air or wheezes  Gastrointestinal: Denies nausea, vomiting, abdominal pain, or change in bowel habits.   Integumentary: Denies rash.   Musculoskeletal: Denies myalgia.       Past Medical History:   Diagnosis Date   • Allergies    • Degeneration of lumbar intervertebral disc 2016   • GERD (gastroesophageal reflux disease)    • Gout    • High blood pressure    • Hypertension    • Mid back pain 2016   • Pulmonary embolism (HCC)    • Thoracic back pain         Past Surgical History:   Procedure Laterality Date   • CHOLECYSTECTOMY     • GALLBLADDER SURGERY     • HEMORRHOIDECTOMY     • LUMBAR EPIDURAL INJECTION     • TONSILLECTOMY          No Known Allergies       Current Outpatient Medications:   •  cetirizine (zyrTEC) 10 MG tablet, Take 10 mg by mouth Daily., Disp: , Rfl:   •  thiamine 100 MG tablet, ONE BY MOUTH THREE TIMES A DAY, Disp: 90 tablet, Rfl: 3  •  azithromycin (Zithromax Z-Armin) 250 MG tablet, Take 2 tablets by mouth on day 1, then 1 tablet daily on days 2-5, Disp: 6 tablet, Rfl: 0  •  benzonatate (TESSALON) 200 MG capsule, Take 1 capsule by mouth 3 (Three) Times a Day As Needed for Cough., Disp: 30 capsule, Rfl: 0  •  Cholecalciferol 125 MCG (5000 UT) tablet, , Disp: , Rfl:   •  hydroCHLOROthiazide (HYDRODIURIL)  "50 MG tablet, Take 1 tablet by mouth Daily for 30 days., Disp: 30 tablet, Rfl: 5  •  lisinopril (PRINIVIL,ZESTRIL) 40 MG tablet, Take 1 tablet by mouth Daily for 30 days., Disp: 30 tablet, Rfl: 5  •  predniSONE (DELTASONE) 20 MG tablet, Take 2 tabs each morning with food for 5 days., Disp: 10 tablet, Rfl: 0    Objective   /84 (BP Location: Left arm, Patient Position: Sitting, Cuff Size: Adult)   Temp 99.6 °F (37.6 °C) (Temporal)   Ht 68 cm (26.77\")   Wt (!) 170 kg (375 lb)   SpO2 97%   .85 kg/m²    Estimated body mass index is 367.85 kg/m² as calculated from the following:    Height as of this encounter: 68 cm (26.77\").    Weight as of this encounter: 170 kg (375 lb).   Physical Exam  Vitals reviewed.   Constitutional:       General: He is not in acute distress.     Appearance: Normal appearance.   HENT:      Head: Normocephalic and atraumatic.      Right Ear: Tympanic membrane and ear canal normal.      Left Ear: Tympanic membrane and ear canal normal.      Nose: Congestion present.      Mouth/Throat:      Mouth: Mucous membranes are moist.      Pharynx: Oropharynx is clear.   Eyes:      Conjunctiva/sclera: Conjunctivae normal.   Cardiovascular:      Rate and Rhythm: Normal rate and regular rhythm.      Heart sounds: Normal heart sounds.   Pulmonary:      Effort: Pulmonary effort is normal.      Breath sounds: Normal breath sounds. No wheezing, rhonchi or rales.   Lymphadenopathy:      Cervical: No cervical adenopathy.   Skin:     General: Skin is warm and dry.   Neurological:      General: No focal deficit present.      Mental Status: He is alert.   Psychiatric:         Behavior: Behavior normal.         Thought Content: Thought content normal.         Judgment: Judgment normal.        Result Review :   The following data was reviewed by: AIDAN Finnegan on 01/17/2022:  POCT Influenza A/B (01/17/2022 12:26): negative        Assessment and Plan    Diagnoses and all orders for this " visit:    1. Acute URI (Primary)  Comments:  Differential diagnosis discussed. Education on medication, and treatment plan. Follow up via phone for test results.  Orders:  -     COVID-19,CEPHEID/NITIN,COR/VALENTIN/PAD/JAYDA IN-HOUSE(OR EMERGENT/ADD-ON),NP SWAB IN TRANSPORT MEDIA 3-4 HR TAT, RT-PCR - Swab, Nasopharynx; Future  -     POCT Influenza A/B  -     azithromycin (Zithromax Z-Armin) 250 MG tablet; Take 2 tablets by mouth on day 1, then 1 tablet daily on days 2-5  Dispense: 6 tablet; Refill: 0  -     predniSONE (DELTASONE) 20 MG tablet; Take 2 tabs each morning with food for 5 days.  Dispense: 10 tablet; Refill: 0  -     benzonatate (TESSALON) 200 MG capsule; Take 1 capsule by mouth 3 (Three) Times a Day As Needed for Cough.  Dispense: 30 capsule; Refill: 0    2. Need for hepatitis C screening test  -     Hepatitis C Antibody; Future      Follow Up     Patient was given instructions and counseling regarding his condition. Please see specific information pulled into the AVS if appropriate.   Return if symptoms worsen or fail to improve, for Next scheduled follow up.    AIDAN Finnegan

## 2022-01-18 ENCOUNTER — TELEPHONE (OUTPATIENT)
Dept: INTERNAL MEDICINE | Facility: CLINIC | Age: 52
End: 2022-01-18

## 2022-01-18 LAB — SARS-COV-2 RNA PNL SPEC NAA+PROBE: DETECTED

## 2022-01-18 NOTE — PROGRESS NOTES
Called and spoke with pt and informed of positive Covid-19. Stay is isolation for 10 days. First day he can go back to work 1/26/22 if no fever (greater than 100 F). He may call back as needed. For immediate medical attention call 911 or present to the ED.

## 2022-01-18 NOTE — TELEPHONE ENCOUNTER
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

## 2022-01-24 ENCOUNTER — TELEPHONE (OUTPATIENT)
Dept: INTERNAL MEDICINE | Facility: CLINIC | Age: 52
End: 2022-01-24

## 2022-01-24 DIAGNOSIS — J06.9 ACUTE URI: Primary | ICD-10-CM

## 2022-01-24 NOTE — TELEPHONE ENCOUNTER
Caller: Kevin Bradshaw A    Relationship: Self    Best call back number: [phone]     What was the call regarding: PAPERWORK STATING HE TESTED POSITIVE FOR COVID FOR HIS WORK AND HE ALSO NEEDS A CALL BACK TO DISCUSS GETTING ANOTHER TEST DONE AS HE NEEDS A NEGATIVE TEST TO GO BACK TO WORK.    Do you require a callback: YES PLEASE CALL [facility] ASAP PLEASE

## 2022-01-24 NOTE — TELEPHONE ENCOUNTER
SPOKE TO PT AND HE STATES HE IS FEELING BETTER AND IS NEEDING ANOTHER COIVD TEST TO RETURN TO WORK. STATES HE IS FEELING BETTER WITH MINIMAL SYMPTOMS. I HAVE PENDED THE ORDER BELOW.

## 2022-02-08 ENCOUNTER — OFFICE VISIT (OUTPATIENT)
Dept: SLEEP MEDICINE | Facility: HOSPITAL | Age: 52
End: 2022-02-08

## 2022-02-08 VITALS
OXYGEN SATURATION: 96 % | HEART RATE: 72 BPM | BODY MASS INDEX: 47.74 KG/M2 | WEIGHT: 315 LBS | HEIGHT: 68 IN | SYSTOLIC BLOOD PRESSURE: 149 MMHG | DIASTOLIC BLOOD PRESSURE: 82 MMHG | TEMPERATURE: 97.3 F

## 2022-02-08 DIAGNOSIS — G47.33 OSA (OBSTRUCTIVE SLEEP APNEA): Primary | ICD-10-CM

## 2022-02-08 PROCEDURE — G0463 HOSPITAL OUTPT CLINIC VISIT: HCPCS | Performed by: PSYCHIATRY & NEUROLOGY

## 2022-02-09 NOTE — PROGRESS NOTES
[facility]    SLEEP CLINIC FOLLOW UP PROGRESS NOTE.    Kevin Bradshaw  1970  51 y.o.  male      PCP: Jennifer Hobbs APRN      Date of visit: 2/8/2022  The patient is returning for follow-up visit.  Reason for follow-up visit: Obstructive sleep apnea and CPAP compliance    HPI:  This is a 51 y.o. years old patient who has a history of obstructive sleep apnea.  He is here for yearly compliance follow-up.  Sleep apnea is severe  with a AHI of 35.9/hr. His   sleep study was done on 12/1/2020.  He had a history of snoring but no other symptoms.  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  Patient is using positive airway pressure and his his snoring have improved significantly.  He has been compliant with CPAP use.  He was last seen for a follow-up visit on 12/15/2020 showing 100% days with device usage.  During that visit, he also reported a 43 pounds weight loss.  The patient reports that he is using his CPAP machine.  He denies any problems with device settings.  He denies any drowsy driving episodes.  He says that he has gained some of his weight back.  He attributes this to having Covid about 3 weeks ago at which time, he did not go to work and was less active.    Normally goes to bed  between 6 PM to 8 PM depending on his work schedule and wakes up at at no particular time again depending on his work schedule.  He reports however that he gets about 8 to 10 hours of sleep during the night.  The patient wakes up 3-4 time(s) during the night and has no problem going back to sleep.  Feels refreshed after waking up.  Patient also denies headaches and nasal congestion.   Patient reports that he is not taking any pain medications or sleep aids.    Mediactions and allergies are reviewed by me and documented in the encounter.     SOCIAL ( habits pertaining to sleep medicine)  History of smoking:Yes   History of alcohol use: 10-12 per  "week  Caffeine use: Three times per week    REVIEW OF SYSTEMS:   [place] Sleepiness Scale :Total score: 3   Nsaal congestion: No   Dry mouth/nose: Yes  Post nasal drip; no  Acid reflux/Heartburn: No  Abd bloating: No  Morining headache: No  Anxiety: No  Depression: No    Physical Exam :  CONSTITUTIONAL:  Vitals:    02/08/22 1100   BP: 149/82   Pulse: 72   Temp: 97.3 °F (36.3 °C)   SpO2: 96%   Weight: (!) 156 kg (343 lb)   Height: 172.7 cm (68\")    Body mass index is 52.15 kg/m².   Neck: No masses noted.  No carotid bruit  RESP SYSTEM: Breath sounds are normal, no wheezes or crackles  CARDIOVASULAR: Heart rate is regular without murmur.  Neuropsych: He is awake alert and oriented.  Responses are coherent and relevant.  Mood and affect appeared appropriate.    Data reviewed:  The Smart card downloaded on 2/8/2022 has been reviewed independently by me for compliance and discussed the data with the patient.   Compliance; 100%  More than 4 hr use, 100%  Average use of the device 10 hours 7 minutes per night  Residual AHI: 0.9 /hr (goal < 5.0 /hr)  Mask type:   DME: [employer]  Current therapy pressures range from 10.9 to 12.9 cm water.  His auto CPAP is set between 10 to 20 cm water pressure.     ASSESSMENT AND PLAN:  · Obstructive sleep apnea ( G 47.33).  The symptoms of sleep apnea have improved with the device and the treatment.  Patient's compliance with the device is excellent for treatment of sleep apnea.  I have independently reviewed the smart card down load and discussed with the patient the download data and encouraged  the patient to continue to use the device.The residual AHI is acceptable. The patient is also instructed to get the supplies from the [employer] and and change them on a regular basis.  A prescription for supplies has been sent to the [employer]  I have also discussed the good sleep hygiene habits and adequate amount of sleep needed for good health.  · Obesity. I have discuss the relationship " between the weight and sleep apnea. The benefit of weight loss in reducing severity of sleep apnea was discussed.   · Equipment recall discussed  · Patient requested that information be faxed to work well to update his information for his coming  physical for his medical card  · Return in about 1 year (around 2/8/2023). . Patient's questions were answered.      Marcy Vora MD  2/8/2022

## 2022-02-27 DIAGNOSIS — I10 ESSENTIAL HYPERTENSION: ICD-10-CM

## 2022-02-27 DIAGNOSIS — Z86.711 HISTORY OF PULMONARY EMBOLUS (PE): ICD-10-CM

## 2022-02-28 RX ORDER — ATENOLOL 25 MG/1
TABLET ORAL
Qty: 30 TABLET | Refills: 5 | Status: SHIPPED | OUTPATIENT
Start: 2022-02-28 | End: 2022-03-29

## 2022-02-28 RX ORDER — APIXABAN 5 MG/1
TABLET, FILM COATED ORAL
Qty: 60 TABLET | Refills: 5 | Status: SHIPPED | OUTPATIENT
Start: 2022-02-28 | End: 2022-03-29 | Stop reason: SDUPTHER

## 2022-03-15 PROBLEM — D75.1 POLYCYTHEMIA, SECONDARY: Status: ACTIVE | Noted: 2022-03-15

## 2022-03-15 PROBLEM — R73.01 IMPAIRED FASTING GLUCOSE: Status: ACTIVE | Noted: 2022-03-15

## 2022-03-29 ENCOUNTER — OFFICE VISIT (OUTPATIENT)
Dept: INTERNAL MEDICINE | Facility: CLINIC | Age: 52
End: 2022-03-29

## 2022-03-29 VITALS
HEIGHT: 68 IN | TEMPERATURE: 98.7 F | BODY MASS INDEX: 38.28 KG/M2 | HEART RATE: 114 BPM | DIASTOLIC BLOOD PRESSURE: 80 MMHG | OXYGEN SATURATION: 98 % | SYSTOLIC BLOOD PRESSURE: 130 MMHG | WEIGHT: 252.6 LBS

## 2022-03-29 DIAGNOSIS — I10 ESSENTIAL HYPERTENSION: Primary | ICD-10-CM

## 2022-03-29 DIAGNOSIS — Z00.00 ANNUAL PHYSICAL EXAM: ICD-10-CM

## 2022-03-29 DIAGNOSIS — E55.9 VITAMIN D DEFICIENCY: ICD-10-CM

## 2022-03-29 DIAGNOSIS — Z86.711 HISTORY OF PULMONARY EMBOLUS (PE): ICD-10-CM

## 2022-03-29 DIAGNOSIS — M62.89 MUSCLE TIGHTNESS: ICD-10-CM

## 2022-03-29 DIAGNOSIS — R73.01 IMPAIRED FASTING GLUCOSE: ICD-10-CM

## 2022-03-29 DIAGNOSIS — J30.2 SEASONAL ALLERGIC RHINITIS, UNSPECIFIED TRIGGER: ICD-10-CM

## 2022-03-29 PROCEDURE — 99214 OFFICE O/P EST MOD 30 MIN: CPT | Performed by: NURSE PRACTITIONER

## 2022-03-29 RX ORDER — METHION/INOS/CHOL BT/B COM/LIV 110MG-86MG
100 CAPSULE ORAL DAILY
Qty: 90 TABLET | Refills: 3 | Status: CANCELLED | OUTPATIENT
Start: 2022-03-29

## 2022-03-29 RX ORDER — HYDROCHLOROTHIAZIDE 50 MG/1
50 TABLET ORAL DAILY
Qty: 30 TABLET | Refills: 5 | Status: SHIPPED | OUTPATIENT
Start: 2022-03-29 | End: 2022-12-14 | Stop reason: SDUPTHER

## 2022-03-29 RX ORDER — ATENOLOL 25 MG/1
25 TABLET ORAL 2 TIMES DAILY
COMMUNITY
End: 2022-03-29 | Stop reason: SDUPTHER

## 2022-03-29 RX ORDER — CYCLOBENZAPRINE HCL 10 MG
10 TABLET ORAL 2 TIMES DAILY PRN
Qty: 30 TABLET | Refills: 5 | Status: SHIPPED | OUTPATIENT
Start: 2022-03-29 | End: 2022-10-05 | Stop reason: SDUPTHER

## 2022-03-29 RX ORDER — PREDNISONE 20 MG/1
TABLET ORAL
Qty: 10 TABLET | Refills: 0 | Status: CANCELLED | OUTPATIENT
Start: 2022-03-29

## 2022-03-29 RX ORDER — LISINOPRIL 40 MG/1
40 TABLET ORAL DAILY
Qty: 30 TABLET | Refills: 5 | Status: SHIPPED | OUTPATIENT
Start: 2022-03-29 | End: 2022-12-02 | Stop reason: SDUPTHER

## 2022-03-29 RX ORDER — ATENOLOL 25 MG/1
25 TABLET ORAL DAILY
Qty: 30 TABLET | Refills: 5 | Status: CANCELLED | OUTPATIENT
Start: 2022-03-29

## 2022-03-29 RX ORDER — FLUTICASONE PROPIONATE 50 MCG
2 SPRAY, SUSPENSION (ML) NASAL DAILY
Qty: 9.9 G | Refills: 3 | Status: SHIPPED | OUTPATIENT
Start: 2022-03-29

## 2022-03-29 RX ORDER — ATENOLOL 25 MG/1
25 TABLET ORAL 2 TIMES DAILY
Qty: 60 TABLET | Refills: 5 | Status: SHIPPED | OUTPATIENT
Start: 2022-03-29 | End: 2022-10-07 | Stop reason: SDUPTHER

## 2022-03-29 NOTE — PROGRESS NOTES
Chief Complaint  Follow-up (6 month follow up, muscle relaxer, new prescription, nasal spray as well. )  Subjective        History of Present Illness  Kevin ENMA Bradshaw is a 51 y.o. male who presents to [facility] for follow-up of hypertension, history of pulmonary embolus, vitamin D deficiency, impaired fasting glucose, and allergies. No recent labs. The patient is not having any medication side effects. Denies chest pain, dizziness, or leg pain. He would like a refill on a muscle relaxer he has used in the past for tight hamstring and back muscles. Mild shortness of air post Covid infection in January. Following with Dr. Camarena and Dr. Pozo.    Past Medical History:   Diagnosis Date   • Allergies    • Anxiety 8/17/2021   • Degeneration of lumbar intervertebral disc 02/05/2016   • GERD (gastroesophageal reflux disease)    • Gout    • High blood pressure    • Hypertension    • Impaired fasting glucose 3/15/2022   • Male hypogonadism 7/28/2021   • Mid back pain 02/24/2016   • Obstructive sleep apnea 7/28/2021   • Polycythemia, secondary 3/15/2022    Related to ETOH abuse    • Pulmonary embolism (HCC)    • Tendinitis of elbow 7/16/2021   • Thoracic back pain    • Vitamin D deficiency 7/28/2021        Past Surgical History:   Procedure Laterality Date   • CHOLECYSTECTOMY     • GALLBLADDER SURGERY     • HEMORRHOIDECTOMY     • LUMBAR EPIDURAL INJECTION  2015   • TONSILLECTOMY          No Known Allergies       Current Outpatient Medications:   •  apixaban (Eliquis) 5 MG tablet tablet, Take 1 tablet by mouth Every 12 (Twelve) Hours., Disp: 60 tablet, Rfl: 5  •  atenolol (TENORMIN) 25 MG tablet, Take 1 tablet by mouth 2 (Two) Times a Day., Disp: 60 tablet, Rfl: 5  •  cetirizine (zyrTEC) 10 MG tablet, Take 10 mg by mouth Daily., Disp: , Rfl:   •  hydroCHLOROthiazide (HYDRODIURIL) 50 MG tablet, Take 1 tablet by mouth Daily for 30 days., Disp: 30 tablet, Rfl: 5  •  lisinopril  "(PRINIVIL,ZESTRIL) 40 MG tablet, Take 1 tablet by mouth Daily for 30 days., Disp: 30 tablet, Rfl: 5  •  cyclobenzaprine (FLEXERIL) 10 MG tablet, Take 1 tablet by mouth 2 (Two) Times a Day As Needed for Muscle Spasms., Disp: 30 tablet, Rfl: 5  •  fluticasone (Flonase) 50 MCG/ACT nasal spray, 2 sprays into the nostril(s) as directed by provider Daily., Disp: 9.9 g, Rfl: 3    Objective   /80   Pulse 114   Temp 98.7 °F (37.1 °C) (Temporal)   Ht 172.7 cm (68\")   Wt 115 kg (252 lb 9.6 oz)   SpO2 98%   BMI 38.41 kg/m²    Estimated body mass index is 38.41 kg/m² as calculated from the following:    Height as of this encounter: 172.7 cm (68\").    Weight as of this encounter: 115 kg (252 lb 9.6 oz).   Physical Exam  Vitals reviewed.   Constitutional:       General: He is not in acute distress.  HENT:      Head: Normocephalic and atraumatic.   Eyes:      Conjunctiva/sclera: Conjunctivae normal.   Neck:      Comments: No thyroid enlargement  Cardiovascular:      Rate and Rhythm: Normal rate and regular rhythm.      Heart sounds: Normal heart sounds.   Pulmonary:      Effort: Pulmonary effort is normal.      Breath sounds: Normal breath sounds. No wheezing, rhonchi or rales.   Musculoskeletal:      Cervical back: Neck supple.      Right lower leg: No edema.      Left lower leg: No edema.   Lymphadenopathy:      Cervical: No cervical adenopathy.   Skin:     General: Skin is warm and dry.   Neurological:      General: No focal deficit present.      Mental Status: He is alert.   Psychiatric:         Thought Content: Thought content normal.           Assessment and Plan    Diagnoses and all orders for this visit:    1. Essential hypertension (Primary)  Comments:  Continue current treatment plan.  Orders:  -     hydroCHLOROthiazide (HYDRODIURIL) 50 MG tablet; Take 1 tablet by mouth Daily for 30 days.  Dispense: 30 tablet; Refill: 5  -     lisinopril (PRINIVIL,ZESTRIL) 40 MG tablet; Take 1 tablet by mouth Daily for 30 " days.  Dispense: 30 tablet; Refill: 5  -     atenolol (TENORMIN) 25 MG tablet; Take 1 tablet by mouth 2 (Two) Times a Day.  Dispense: 60 tablet; Refill: 5    2. History of pulmonary embolus (PE)  Overview:  3/22/21 bilateral pulmonary emboli saddle; hospitalized per Dr. RUSSELL Paez and started on Eliquis. Follows with hematology.      Orders:  -     apixaban (Eliquis) 5 MG tablet tablet; Take 1 tablet by mouth Every 12 (Twelve) Hours.  Dispense: 60 tablet; Refill: 5    3. Seasonal allergic rhinitis, unspecified trigger  Comments:  Stable on Zyrtec 10 mg and Flonase nasal spray daily.  Orders:  -     fluticasone (Flonase) 50 MCG/ACT nasal spray; 2 sprays into the nostril(s) as directed by provider Daily.  Dispense: 9.9 g; Refill: 3    4. Muscle tightness  Comments:  Refill Flexeril 10 mg as needed muscle spasms/tightness twice daily.  Continue stretching.  Orders:  -     cyclobenzaprine (FLEXERIL) 10 MG tablet; Take 1 tablet by mouth 2 (Two) Times a Day As Needed for Muscle Spasms.  Dispense: 30 tablet; Refill: 5    5. Impaired fasting glucose    6. Vitamin D deficiency  -     Vitamin D 25 Hydroxy; Future    7. Annual physical exam  Comments:  labs ordered for wellness in 6 months  Orders:  -     Comprehensive Metabolic Panel; Future  -     CBC & Differential; Future  -     Lipid Panel; Future  -     T4, Free; Future  -     TSH; Future  -     Vitamin D 25 Hydroxy; Future  -     Hemoglobin A1c; Future    Follow Up     Patient was given instructions and counseling regarding his condition or for health maintenance advice. Please see specific information pulled into the AVS if appropriate.   Return in about 6 months (around 9/29/2022) for Annual physical.    AIDAN Finnegan

## 2022-04-05 RX ORDER — FOLIC ACID 1 MG/1
TABLET ORAL
Qty: 60 TABLET | Refills: 3 | Status: SHIPPED | OUTPATIENT
Start: 2022-04-05 | End: 2022-08-08

## 2022-05-16 DIAGNOSIS — K21.9 GASTRO-ESOPHAGEAL REFLUX DISEASE WITHOUT ESOPHAGITIS: ICD-10-CM

## 2022-05-16 RX ORDER — PANTOPRAZOLE SODIUM 40 MG/1
TABLET, DELAYED RELEASE ORAL
Qty: 30 TABLET | Refills: 5 | Status: SHIPPED | OUTPATIENT
Start: 2022-05-16 | End: 2022-11-23 | Stop reason: SDUPTHER

## 2022-05-19 ENCOUNTER — TELEMEDICINE (OUTPATIENT)
Dept: INTERNAL MEDICINE | Facility: CLINIC | Age: 52
End: 2022-05-19

## 2022-05-19 DIAGNOSIS — R11.2 NAUSEA AND VOMITING, UNSPECIFIED VOMITING TYPE: Primary | ICD-10-CM

## 2022-05-19 PROCEDURE — 99213 OFFICE O/P EST LOW 20 MIN: CPT

## 2022-05-19 RX ORDER — ALLOPURINOL 100 MG/1
100 TABLET ORAL DAILY
COMMUNITY
Start: 2022-03-07 | End: 2022-06-08

## 2022-05-19 RX ORDER — ONDANSETRON 4 MG/1
4 TABLET, FILM COATED ORAL EVERY 8 HOURS PRN
Qty: 15 TABLET | Refills: 0 | Status: SHIPPED | OUTPATIENT
Start: 2022-05-19 | End: 2022-05-23

## 2022-05-19 NOTE — ASSESSMENT & PLAN NOTE
I discussed with the patient the difficulty of assessing properly due to being a visit over telemedicine.  Patient verbalized understanding and wanted to proceed with visit.  Discussed with the patient about differential diagnoses which include acute food poisoning, gastritis, acute viral illness.  We will send in Zofran to help with nausea and to keep fluids down.  Advised patient to push fluids and utilize a bland diet for today.  If food poisoning symptoms should improve within 24 hours.  Patient denies abdominal pain and blood in stool.  Patient also reports his diarrhea has improved over the morning.  Patient is to contact the office or report to emergency department/urgent care if abdominal pain/fever/blood in stool/symptoms would worsen.  Patient verbalized understanding.

## 2022-05-19 NOTE — PROGRESS NOTES
Patient agrees to participate in a telemedicine evaluation performed by AIDAN Temple. Patient authorizes the electronic transmission of medical information and/or videoconference session. Patient understands that he/she can still pursue face-to-face consultation if desired. Patient understands that as with any technology, telemedicine does have its limitations.  If at any time the provider feels that she needs to cancel the telemedicine visit and request the patient come in for further physical examination/vital signs the provider has the right to do that.  During this telemedicine visit there is a chance that symptoms pertinent to the patient's condition could be missed. Patient understands and would like to proceed with telemedicine visit at this time.    Patient was seen via video visit using [employer].   Chief Complaint  Nausea (Patient has been having nausea and vomiting, and diarrhea that started this morning. diarrhea has subsided a little.)    Subjective          Kevin Bradshaw presents to [facility]  History of Present Illness    Kevin is using telemedicine visit via [employer].     He reports nausea, vomiting, and diarrhea this morning. He reports the diarrhea has gotten a little bit better over the past few hours. He isn't sure if its bad food, or not. He reports eating turkey loaf and vegetables last night for dinner- but daughter ate the same thing and shes fine.  He does report leaving some gravy out overnight and ate some of that this morning and does not know if the gravy was bad from leaving it out overnight. Denies any other symptoms. Denies fevers.  He reports vomiting once this morning but has had a couple episodes of diarrhea since then.  He does report being able to keep his medications down as well as food/liquids.  Denies any sick contact   Denies body aches/headaches   Denies chest pain   Denies trouble breathing   Can tell his stomach is achy and upset  but no abdominal pain.    He states he will need a work note.     Objective   Vital Signs:   There were no vitals taken for this visit.    Physical Exam  Constitutional:       Appearance: Normal appearance.   HENT:      Head: Normocephalic and atraumatic.   Eyes:      General: No scleral icterus.        Right eye: No discharge.         Left eye: No discharge.      Conjunctiva/sclera: Conjunctivae normal.   Pulmonary:      Effort: Pulmonary effort is normal.   Skin:     Findings: No lesion or rash.   Neurological:      Mental Status: He is alert and oriented to person, place, and time. Mental status is at baseline.   Psychiatric:         Mood and Affect: Mood normal.         Behavior: Behavior normal.         Thought Content: Thought content normal.         Judgment: Judgment normal.        Result Review :          Procedures      Assessment and Plan    Diagnoses and all orders for this visit:    1. Nausea and vomiting, unspecified vomiting type (Primary)  Assessment & Plan:  I discussed with the patient the difficulty of assessing properly due to being a visit over telemedicine.  Patient verbalized understanding and wanted to proceed with visit.  Discussed with the patient about differential diagnoses which include acute food poisoning, gastritis, acute viral illness.  We will send in Zofran to help with nausea and to keep fluids down.  Advised patient to push fluids and utilize a bland diet for today.  If food poisoning symptoms should improve within 24 hours.  Patient denies abdominal pain and blood in stool.  Patient also reports his diarrhea has improved over the morning.  Patient is to contact the office or report to emergency department/urgent care if abdominal pain/fever/blood in stool/symptoms would worsen.  Patient verbalized understanding.    Orders:  -     ondansetron (Zofran) 4 MG tablet; Take 1 tablet by mouth Every 8 (Eight) Hours As Needed for Nausea or Vomiting.  Dispense: 15 tablet; Refill:  0    Patient was instructed and educated on their diagnoses and treatment plan prior to leaving the office. If symptoms worsen or persist, seek emergency medical attention. Take all medications as prescribed. Call the office if any questions or concerns arise.       Follow Up   Return if symptoms worsen or fail to improve.  Patient was given instructions and counseling regarding his condition or for health maintenance advice. Please see specific information pulled into the AVS if appropriate.

## 2022-05-23 ENCOUNTER — TELEMEDICINE (OUTPATIENT)
Dept: INTERNAL MEDICINE | Facility: CLINIC | Age: 52
End: 2022-05-23

## 2022-05-23 ENCOUNTER — LAB (OUTPATIENT)
Dept: LAB | Facility: HOSPITAL | Age: 52
End: 2022-05-23

## 2022-05-23 ENCOUNTER — TELEPHONE (OUTPATIENT)
Dept: INTERNAL MEDICINE | Facility: CLINIC | Age: 52
End: 2022-05-23

## 2022-05-23 DIAGNOSIS — R53.81 MALAISE: ICD-10-CM

## 2022-05-23 DIAGNOSIS — R11.0 NAUSEA: Primary | ICD-10-CM

## 2022-05-23 PROCEDURE — 99213 OFFICE O/P EST LOW 20 MIN: CPT | Performed by: NURSE PRACTITIONER

## 2022-05-23 PROCEDURE — U0004 COV-19 TEST NON-CDC HGH THRU: HCPCS | Performed by: NURSE PRACTITIONER

## 2022-05-23 PROCEDURE — C9803 HOPD COVID-19 SPEC COLLECT: HCPCS | Performed by: NURSE PRACTITIONER

## 2022-05-23 RX ORDER — ONDANSETRON 4 MG/1
4 TABLET, ORALLY DISINTEGRATING ORAL EVERY 8 HOURS PRN
Qty: 12 TABLET | Refills: 1 | Status: SHIPPED | OUTPATIENT
Start: 2022-05-23

## 2022-05-23 NOTE — TELEPHONE ENCOUNTER
Caller: Kevin Bradshaw    Relationship: Self    Best call back number: [phone]    What is the best time to reach you: ANYTIME    Who are you requesting to speak with (clinical staff, provider,  specific staff member): NURSE       What was the call regarding: PATIENT IS CALLING REGARDING WORK NOTE FOR HIS JOB.  IF IT COULD BE E-MAILED TO HIS BOSS - [email or web address]  PLEASE ADVISE PATIENT WHEN THIS HAS BEEN EMAILED IF IT CAN.  THE STORE DOES NOT HAVE A FAX MACHINE.     Do you require a callback: YES

## 2022-05-23 NOTE — PROGRESS NOTES
Chief Complaint  Nausea (He states he feels sick to his stomach and hurts. Not like the other day. Bowel movements normal and regular. )  Subjective        History of Present Illness  Kevin ENMA Bradshaw is a 51 y.o. male who presents to [facility] at [address] [place] [address] for evaluation via video conferencing visit.  The patient's current location is home at address listed in [place]. You have chosen to receive care through a telehealth visit.  Do you consent to use a video/audio connection for your medical care today? Yes.       He presents for nausea, upset stomach, and malaise since yesterday morning. Discomfort is mid abdomen above navel area. He is tolerating clear liquids. Positive for no appetite. Negative for abdominal pain,  vomiting and diarrhea. He had Covid illness 5-6 months ago.  He had a coworker that tested positive for COVID but he states he was not near him.    Review of Systems  Constitutional: Negative for loss of taste or smell, fever or chills.    Lymphatic: Negative for painful or swollen nodes.   HEENT: Negative for headaches, congestion, sinus pain, earache, or sore throat.   Cardiovascular: Negative for chest pain, shortness of breath or peripheral edema.   Respiratory: Negative for dyspnea.   Gastrointestinal: Negative for constipation.  Urinary: Negative for dysuria, urgency or frequency.   Integumentary: Negative for rash or recent tick bites.   Musculoskeletal: Negative for myalgia.    Past Medical History:   Diagnosis Date   • Allergies    • Anxiety 8/17/2021   • Degeneration of lumbar intervertebral disc 02/05/2016   • GERD (gastroesophageal reflux disease)    • Gout    • High blood pressure    • Hypertension    • Impaired fasting glucose 3/15/2022   • Male hypogonadism 7/28/2021   • Mid back pain 02/24/2016   • Obstructive sleep apnea 7/28/2021   • Polycythemia, secondary 3/15/2022    Related to ETOH abuse    • Pulmonary embolism (HCC)  "   • Tendinitis of elbow 7/16/2021   • Thoracic back pain    • Vitamin D deficiency 7/28/2021        Past Surgical History:   Procedure Laterality Date   • CHOLECYSTECTOMY     • GALLBLADDER SURGERY     • HEMORRHOIDECTOMY     • LUMBAR EPIDURAL INJECTION  2015   • TONSILLECTOMY          No Known Allergies       Current Outpatient Medications:   •  allopurinol (ZYLOPRIM) 100 MG tablet, Take 100 mg by mouth Daily., Disp: , Rfl:   •  apixaban (Eliquis) 5 MG tablet tablet, Take 1 tablet by mouth Every 12 (Twelve) Hours., Disp: 60 tablet, Rfl: 5  •  atenolol (TENORMIN) 25 MG tablet, Take 1 tablet by mouth 2 (Two) Times a Day., Disp: 60 tablet, Rfl: 5  •  cetirizine (zyrTEC) 10 MG tablet, Take 10 mg by mouth Daily., Disp: , Rfl:   •  cyclobenzaprine (FLEXERIL) 10 MG tablet, Take 1 tablet by mouth 2 (Two) Times a Day As Needed for Muscle Spasms., Disp: 30 tablet, Rfl: 5  •  fluticasone (Flonase) 50 MCG/ACT nasal spray, 2 sprays into the nostril(s) as directed by provider Daily., Disp: 9.9 g, Rfl: 3  •  folic acid (FOLVITE) 1 MG tablet, TAKE ONE TABLET BY MOUTH TWICE A DAY, Disp: 60 tablet, Rfl: 3  •  pantoprazole (PROTONIX) 40 MG EC tablet, TAKE ONE TABLET BY MOUTH DAILY, Disp: 30 tablet, Rfl: 5  •  hydroCHLOROthiazide (HYDRODIURIL) 50 MG tablet, Take 1 tablet by mouth Daily for 30 days., Disp: 30 tablet, Rfl: 5  •  lisinopril (PRINIVIL,ZESTRIL) 40 MG tablet, Take 1 tablet by mouth Daily for 30 days., Disp: 30 tablet, Rfl: 5  •  ondansetron ODT (Zofran ODT) 4 MG disintegrating tablet, Place 1 tablet on the tongue Every 8 (Eight) Hours As Needed for Nausea or Vomiting., Disp: 12 tablet, Rfl: 1    Objective   There were no vitals taken for this visit.   Estimated body mass index is 38.41 kg/m² as calculated from the following:    Height as of 3/29/22: 172.7 cm (68\").    Weight as of 3/29/22: 115 kg (252 lb 9.6 oz).     Physical Exam  Constitutional:       General: He is not in acute distress.  Pulmonary:      Effort: Pulmonary " effort is normal.   Neurological:      General: No focal deficit present.      Mental Status: He is alert.   Psychiatric:         Thought Content: Thought content normal.         Judgment: Judgment normal.        Assessment and Plan    Diagnoses and all orders for this visit:    1. Nausea (Primary)  -     Cancel: COVID-19,APTIMA PANTHER(ZAIN),[facility], NP/OP SWAB IN UTM/VTM/SALINE TRANSPORT MEDIA,24 HR TAT - Swab, Nasopharynx  -     COVID-19,APTIMA PANTHER(ZAIN),[facility], NP/OP SWAB IN UTM/VTM/SALINE TRANSPORT MEDIA,24 HR TAT - Swab, Nasopharynx    2. Malaise  -     Cancel: COVID-19,APTIMA PANTHER(ZAIN),[facility], NP/OP SWAB IN UTM/VTM/SALINE TRANSPORT MEDIA,24 HR TAT - Swab, Nasopharynx  -     COVID-19,APTIMA PANTHER(ZAIN),[facility], NP/OP SWAB IN UTM/VTM/SALINE TRANSPORT MEDIA,24 HR TAT - Swab, Nasopharynx    Other orders  -     ondansetron ODT (Zofran ODT) 4 MG disintegrating tablet; Place 1 tablet on the tongue Every 8 (Eight) Hours As Needed for Nausea or Vomiting.  Dispense: 12 tablet; Refill: 1    Education on medication.  The patient will take an at home COVID test and if positive will call back with the result.  If the test is negative he will have a PCR COVID test done through the lab.  The patient is to stay on clear liquids.  If his symptoms worsen or do not improve he is to call back.  He needs a work excuse through 5/25/2022.    Follow Up     Patient was given instructions and counseling regarding his condition. Please see specific information pulled into the AVS if appropriate.   Return if symptoms worsen or fail to improve.    The visit included audio and video interaction. No technical issues occurred during this visit. The provider spent 16:05 minutes with the patient during the video conferencing visit via [employer].The following staff were present during the visit: None.    AIDAN Finnegan

## 2022-05-24 ENCOUNTER — TELEPHONE (OUTPATIENT)
Dept: INTERNAL MEDICINE | Facility: CLINIC | Age: 52
End: 2022-05-24

## 2022-05-24 LAB — SARS-COV-2 RNA PNL SPEC NAA+PROBE: NOT DETECTED

## 2022-05-24 NOTE — TELEPHONE ENCOUNTER
Caller: Kevin Bradshaw A    Relationship to patient: Self    Best call back number: [phone]    Patient is needing: PATIENT CHECKING ON STATUS OF COVID TEST RESULTS

## 2022-05-24 NOTE — TELEPHONE ENCOUNTER
Spoke with patient advised that results were not back yet. Will advise patient when they are. Marking complete.

## 2022-05-24 NOTE — TELEPHONE ENCOUNTER
Caller: Kevin Bradshaw A    Relationship to patient: Self    Best call back number: [phone]    Patient is needing: PATIENT CALLED IN AND WOULD LIKE A CALL BACK WHEN HIS COVID TEST RESULTS ARE IN PLEASE.

## 2022-06-08 RX ORDER — ALLOPURINOL 100 MG/1
TABLET ORAL
Qty: 30 TABLET | Refills: 0 | Status: SHIPPED | OUTPATIENT
Start: 2022-06-08 | End: 2022-07-15 | Stop reason: SDUPTHER

## 2022-07-15 RX ORDER — ALLOPURINOL 100 MG/1
100 TABLET ORAL DAILY
Qty: 30 TABLET | Refills: 0 | Status: SHIPPED | OUTPATIENT
Start: 2022-07-15 | End: 2022-08-12 | Stop reason: SDUPTHER

## 2022-07-15 NOTE — TELEPHONE ENCOUNTER
Caller: Kevin Bradshaw    Relationship: Self    Best call back number: [phone]    Requested Prescriptions:   Requested Prescriptions     Pending Prescriptions Disp Refills   • allopurinol (ZYLOPRIM) 100 MG tablet 30 tablet 0     Sig: Take 1 tablet by mouth Daily.        Pharm[facility] where request should be sent: [facility] [facility] [address] [facility]  [phone] [facility] [phone] FX     Additional details provided by patient:     Does the patient have less than a 3 day supply:  [x] Yes  [] No    Toby White Rep   07/15/22 09:31 EDT

## 2022-08-08 RX ORDER — FOLIC ACID 1 MG/1
TABLET ORAL
Qty: 60 TABLET | Refills: 3 | Status: SHIPPED | OUTPATIENT
Start: 2022-08-08 | End: 2022-12-14

## 2022-08-12 RX ORDER — ALLOPURINOL 100 MG/1
TABLET ORAL
Qty: 30 TABLET | Refills: 0 | Status: SHIPPED | OUTPATIENT
Start: 2022-08-12 | End: 2022-09-12

## 2022-09-12 RX ORDER — ALLOPURINOL 100 MG/1
TABLET ORAL
Qty: 30 TABLET | Refills: 0 | Status: SHIPPED | OUTPATIENT
Start: 2022-09-12 | End: 2022-10-16

## 2022-10-05 DIAGNOSIS — M62.89 MUSCLE TIGHTNESS: ICD-10-CM

## 2022-10-05 RX ORDER — CYCLOBENZAPRINE HCL 10 MG
10 TABLET ORAL 2 TIMES DAILY PRN
Qty: 30 TABLET | Refills: 5 | Status: SHIPPED | OUTPATIENT
Start: 2022-10-05

## 2022-10-05 NOTE — TELEPHONE ENCOUNTER
Caller: Kevin Bradshaw    Relationship: Self    Best call back number: [phone]    Requested Prescriptions:   Requested Prescriptions     Pending Prescriptions Disp Refills   • cyclobenzaprine (FLEXERIL) 10 MG tablet 30 tablet 5     Sig: Take 1 tablet by mouth 2 (Two) Times a Day As Needed for Muscle Spasms.        Pharmacy where request should be sent: [facility] [id number] [place] [address] [facility] [address] [phone] [facility] [phone] FX         Does the patient have less than a 3 day supply:  [x] Yes  [] No    Toby Phan Rep   10/05/22 12:14 EDT

## 2022-10-07 DIAGNOSIS — I10 ESSENTIAL HYPERTENSION: ICD-10-CM

## 2022-10-07 RX ORDER — ATENOLOL 25 MG/1
25 TABLET ORAL 2 TIMES DAILY
Qty: 60 TABLET | Refills: 5 | Status: SHIPPED | OUTPATIENT
Start: 2022-10-07 | End: 2022-12-19 | Stop reason: SDUPTHER

## 2022-10-07 NOTE — TELEPHONE ENCOUNTER
Caller: Kevin Bradshaw    Relationship: Self    Best call back number: [phone]    Requested Prescriptions:   Requested Prescriptions     Pending Prescriptions Disp Refills   • atenolol (TENORMIN) 25 MG tablet 60 tablet 5     Sig: Take 1 tablet by mouth 2 (Two) Times a Day.        Pharmacy where request should be sent: [facility] [id number] [place] [address] [facility] [address] [phone] [facility] [phone] FX     Additional details provided by patient: PATIENT IS FULLY OUT OF THE MEDICATION.    Does the patient have less than a 3 day supply:  [x] Yes  [] No    Toby Walters Rep   10/07/22 12:19 EDT

## 2022-10-16 RX ORDER — ALLOPURINOL 100 MG/1
TABLET ORAL
Qty: 30 TABLET | Refills: 0 | Status: SHIPPED | OUTPATIENT
Start: 2022-10-16 | End: 2022-11-14

## 2022-11-14 RX ORDER — ALLOPURINOL 100 MG/1
TABLET ORAL
Qty: 30 TABLET | Refills: 0 | Status: SHIPPED | OUTPATIENT
Start: 2022-11-14 | End: 2022-12-14

## 2022-11-22 ENCOUNTER — TELEPHONE (OUTPATIENT)
Dept: INTERNAL MEDICINE | Facility: CLINIC | Age: 52
End: 2022-11-22

## 2022-11-22 DIAGNOSIS — K21.9 GASTRO-ESOPHAGEAL REFLUX DISEASE WITHOUT ESOPHAGITIS: ICD-10-CM

## 2022-11-22 NOTE — TELEPHONE ENCOUNTER
Caller: Kevin Bradshaw    Relationship: Self    Best call back number: [phone]    Requested Prescriptions:   Requested Prescriptions      No prescriptions requested or ordered in this encounter    ACID REFLUX MEDICATION, UNSURE OF NAME OR DOSAGE    Pharmacy where request should be sent: [facility] [id number] [place], [place] [address] [facility] [facility] [phone] [facility] [phone] FX     Does the patient have less than a 3 day supply:  [x] Yes  [] No    Toby Walters Rep   11/22/22 12:03 EST

## 2022-11-23 RX ORDER — PANTOPRAZOLE SODIUM 40 MG/1
40 TABLET, DELAYED RELEASE ORAL DAILY
Qty: 30 TABLET | Refills: 5 | Status: SHIPPED | OUTPATIENT
Start: 2022-11-23

## 2022-12-02 ENCOUNTER — TELEPHONE (OUTPATIENT)
Dept: INTERNAL MEDICINE | Facility: CLINIC | Age: 52
End: 2022-12-02

## 2022-12-02 DIAGNOSIS — I10 ESSENTIAL HYPERTENSION: ICD-10-CM

## 2022-12-02 PROCEDURE — 82962 GLUCOSE BLOOD TEST: CPT

## 2022-12-02 RX ORDER — LISINOPRIL 40 MG/1
40 TABLET ORAL DAILY
Qty: 30 TABLET | Refills: 5 | Status: SHIPPED | OUTPATIENT
Start: 2022-12-02 | End: 2022-12-19 | Stop reason: SDUPTHER

## 2022-12-13 ENCOUNTER — APPOINTMENT (OUTPATIENT)
Dept: GENERAL RADIOLOGY | Facility: HOSPITAL | Age: 52
End: 2022-12-13

## 2022-12-13 ENCOUNTER — HOSPITAL ENCOUNTER (EMERGENCY)
Facility: HOSPITAL | Age: 52
Discharge: HOME | End: 2022-12-13
Attending: EMERGENCY MEDICINE | Admitting: EMERGENCY MEDICINE

## 2022-12-13 VITALS
BODY MASS INDEX: 46.65 KG/M2 | DIASTOLIC BLOOD PRESSURE: 58 MMHG | TEMPERATURE: 97.9 F | HEART RATE: 72 BPM | HEIGHT: 69 IN | WEIGHT: 315 LBS | OXYGEN SATURATION: 99 % | RESPIRATION RATE: 20 BRPM | SYSTOLIC BLOOD PRESSURE: 134 MMHG

## 2022-12-13 DIAGNOSIS — M54.9 MUSCULOSKELETAL BACK PAIN: ICD-10-CM

## 2022-12-13 DIAGNOSIS — M54.50 ACUTE LEFT-SIDED LOW BACK PAIN WITHOUT SCIATICA: Primary | ICD-10-CM

## 2022-12-13 LAB
BACTERIA UR QL AUTO: ABNORMAL /HPF
BILIRUB UR QL STRIP: NEGATIVE
CLARITY UR: CLEAR
COLOR UR: YELLOW
GLUCOSE UR STRIP-MCNC: NEGATIVE MG/DL
HGB UR QL STRIP.AUTO: ABNORMAL
HYALINE CASTS UR QL AUTO: ABNORMAL /LPF
KETONES UR QL STRIP: NEGATIVE
LEUKOCYTE ESTERASE UR QL STRIP.AUTO: NEGATIVE
NITRITE UR QL STRIP: NEGATIVE
PH UR STRIP.AUTO: 6 [PH] (ref 5–8)
PROT UR QL STRIP: NEGATIVE
RBC # UR STRIP: ABNORMAL /HPF
REF LAB TEST METHOD: ABNORMAL
SP GR UR STRIP: 1.01 (ref 1–1.03)
SQUAMOUS #/AREA URNS HPF: ABNORMAL /HPF
UROBILINOGEN UR QL STRIP: ABNORMAL
WBC # UR STRIP: ABNORMAL /HPF

## 2022-12-13 PROCEDURE — 97140 MANUAL THERAPY 1/> REGIONS: CPT | Performed by: PHYSICAL THERAPIST

## 2022-12-13 PROCEDURE — 97110 THERAPEUTIC EXERCISES: CPT | Performed by: PHYSICAL THERAPIST

## 2022-12-13 PROCEDURE — 74018 RADEX ABDOMEN 1 VIEW: CPT

## 2022-12-13 PROCEDURE — 97161 PT EVAL LOW COMPLEX 20 MIN: CPT | Performed by: PHYSICAL THERAPIST

## 2022-12-13 PROCEDURE — 25010000002 KETOROLAC TROMETHAMINE PER 15 MG

## 2022-12-13 PROCEDURE — 96372 THER/PROPH/DIAG INJ SC/IM: CPT

## 2022-12-13 PROCEDURE — 25010000002 DEXAMETHASONE PER 1 MG

## 2022-12-13 PROCEDURE — 81001 URINALYSIS AUTO W/SCOPE: CPT

## 2022-12-13 PROCEDURE — 99283 EMERGENCY DEPT VISIT LOW MDM: CPT

## 2022-12-13 RX ORDER — DEXAMETHASONE SODIUM PHOSPHATE 10 MG/ML
10 INJECTION INTRAMUSCULAR; INTRAVENOUS ONCE
Status: COMPLETED | OUTPATIENT
Start: 2022-12-13 | End: 2022-12-13

## 2022-12-13 RX ORDER — KETOROLAC TROMETHAMINE 30 MG/ML
30 INJECTION, SOLUTION INTRAMUSCULAR; INTRAVENOUS ONCE
Status: COMPLETED | OUTPATIENT
Start: 2022-12-13 | End: 2022-12-13

## 2022-12-13 RX ORDER — BACLOFEN 10 MG/1
10 TABLET ORAL 3 TIMES DAILY
Qty: 21 TABLET | Refills: 0 | Status: SHIPPED | OUTPATIENT
Start: 2022-12-13 | End: 2022-12-20

## 2022-12-13 RX ADMIN — DEXAMETHASONE SODIUM PHOSPHATE 10 MG: 10 INJECTION INTRAMUSCULAR; INTRAVENOUS at 07:52

## 2022-12-13 RX ADMIN — KETOROLAC TROMETHAMINE 30 MG: 30 INJECTION, SOLUTION INTRAMUSCULAR; INTRAVENOUS at 07:51

## 2022-12-13 NOTE — THERAPY EVALUATION
Patient Name: Kevin Bradshaw  : 1970    MRN: [id number]                              Today's Date: 2022       Admit Date: 2022    Visit Dx:     ICD-10-CM ICD-9-CM   1. Acute left-sided low back pain without sciatica  M54.50 724.2     Patient Active Problem List   Diagnosis   • Tendinitis of elbow   • Chronic gouty arthritis   • Degeneration of lumbar intervertebral disc   • Essential hypertension   • Gastro-esophageal reflux disease without esophagitis   • Male hypogonadism   • Obstructive sleep apnea   • Low back pain   • Thoracic back pain   • Vitamin D deficiency   • History of pulmonary embolus (PE)   • Anxiety   • Impaired fasting glucose   • Polycythemia, secondary   • Nausea and vomiting     Past Medical History:   Diagnosis Date   • Allergies    • Anxiety 2021   • Degeneration of lumbar intervertebral disc 2016   • GERD (gastroesophageal reflux disease)    • Gout    • High blood pressure    • Hypertension    • Impaired fasting glucose 3/15/2022   • Male hypogonadism 2021   • Mid back pain 2016   • Obstructive sleep apnea 2021   • Polycythemia, secondary 3/15/2022    Related to ETOH abuse    • Pulmonary embolism (HCC)    • Tendinitis of elbow 2021   • Thoracic back pain    • Vitamin D deficiency 2021     Past Surgical History:   Procedure Laterality Date   • CHOLECYSTECTOMY     • GALLBLADDER SURGERY     • HEMORRHOIDECTOMY     • LUMBAR EPIDURAL INJECTION     • TONSILLECTOMY        General Information     Row Name 22 0907          Physical Therapy Time and Intention    Document Type evaluation  -LR     Mode of Treatment individual therapy  -LR     Row Name 22 0907          General Information    Patient Profile Reviewed yes  -LR     Prior Level of Function independent:  -LR           User Key  (r) = Recorded By, (t) = Taken By, (c) = Cosigned By    Initials Name Provider Type    LR Yamile Vargas PT Physical Therapist               Subjective: Pt reports the left side of his low back started bothering him about two weeks ago but this morning he woke up and his pain was a lot worse.  He states the pain is worse on the left side.  Pain increases with bending over and lifting his left leg.  Denies N/T.  Pt works at [employer].  Pt's pain is a 6/10 currently.     Objective:    Palpation: Tender to palpation at L lumbar musculature (around L4/5 level)    ROM:  Lumbar ROM:  Flexion: 75%  Extension: to neutral (painful)  L Side bendin% and painful  R Side bending: WNL, painful when returning to netural  L Rotation: 75%  R Rotation: 75%     Strength:  L Hip MMT:   R Hip MMT:  Flexion: 5/5  Flexion: 5/5  Abduction: 5/5  Abduction: 5/5  Adduction: 5/5  Adduction: 5/5    L Knee MMT:  R Knee MMT:  Flexion: 5/5  Flexion: 5/5  Extension: 5/5  Extension: 5/5    L Ankle MMT:  R Ankle MMT:  DF: 5/5  DF: 5/5  PF: 5/5   PF: 5/5  Inversion: 5/5  Inversion: 5/5  Eversion: 5/5  Eversion: 5/5    Special Tests:  Quadrant Test: positive  Slump Test: NT  Straight Leg Raise Test: negative  Contralateral Straight Leg Raise Test: negative  Obers Test: NT  FABERs: positive on L     Sensation: B LE sensation intact    Assessment/Plan:   Pt presents with a diagnosis of left sided flank pain and has pain/tenderness in L lumbar region, decreased lumbar ROM, and pain with lifting his left leg that are limiting his ability to stand, sit, and perform functional activities.  Manual stretching and STM of L leg and lumbar region were performed with an improvement in mobility and pain afterwards.  Pt was educated in and performed exercises for lumbar/hip mobility.  He was provided with a HEP handout.    Goals:   LTG 1: The patient will be independent in HEP in order to decrease pain and improve tolerance to functional activities.  STATUS: Met    Interventions:   Manual Therapy: Passive ROM: L hip flexion, IR, ER; L LE long axis distraction; STM to L lumbar  musculature    Therapeutic Exercises: LTR (10x3 seconds), sidelying rotation stretch (3x20 seconds L), quadratus lumborum stretch (2x20 seconds L), SKTC (education only), posterior pelvic tilt (education only)    Modalities: not performed   Outcome Measures     Row Name 12/13/22 0907          Optimal Instrument    Optimal Instrument Optimal - 3  -LR     Sitting 2  -LR     Bending/Stooping 2  -LR     Standing 2  -LR     From the list, choose the 3 activities you would most like to be able to do without any difficulty Standing;Sitting;Bending/stooping  -LR     Total Score Optimal - 3 6  -LR     Row Name 12/13/22 0907          Functional Assessment    Outcome Measure Options Optimal Instrument  -LR           User Key  (r) = Recorded By, (t) = Taken By, (c) = Cosigned By    Initials Name Provider Type    LR Yamile Vargas, PT Physical Therapist                 12/13/22 0908   PT Evaluation Complexity   History, PT Evaluation Complexity 1-2 personal factors and/or comorbidities   Examination of Body Systems (PT Eval Complexity) 1-2 elements   Clinical Presentation (PT Evaluation Complexity) stable   Clinical Decision Making (PT Evaluation Complexity) low complexity   Overall Complexity (PT Evaluation Complexity) low complexity      Time Calculation:    PT Charges     Row Name 12/13/22 0908             Time Calculation    PT Received On 12/13/22  -LR         Time Calculation- PT    Total Timed Code Minutes- PT 42 minute(s)  -LR         Timed Charges    96420 - PT Therapeutic Exercise Minutes 10  -LR      80539 - PT Manual Therapy Minutes 13  -LR         Untimed Charges    PT Eval/Re-eval Minutes 19  -LR         Total Minutes    Timed Charges Total Minutes 23  -LR      Untimed Charges Total Minutes 19  -LR       Total Minutes 42  -LR            User Key  (r) = Recorded By, (t) = Taken By, (c) = Cosigned By    Initials Name Provider Type    LR Yamile Vargas, PT Physical Therapist              Therapy Charges for Today      Code Description Service Date Service Provider Modifiers Qty    40493514097  PT THER PROC EA 15 MIN 12/13/2022 Yamile Vargas, PT GP 1    40863510916 HC PT MANUAL THERAPY EA 15 MIN 12/13/2022 Yamile Vargas, PT GP 1    [id number]  PT EVAL LOW COMPLEXITY 2 12/13/2022 Yamile Vargas, PT GP 1          PT G-Codes  Outcome Measure Options: Optimal Instrument       Yamile Vargas, PT  12/13/2022

## 2022-12-13 NOTE — DISCHARGE INSTRUCTIONS
Your x-ray was negative for any kidney stones  This is most likely just a muscle strain  You can stop taking the Flexeril and try the baclofen as needed along with the Tylenol/Motrin as needed  Please keep your upcoming appointment with your primary care for this coming Thursday  If any worsening symptoms or new concerns please return to the ED

## 2022-12-13 NOTE — ED PROVIDER NOTES
Time: 7:35 AM EST  Chief Complaint:   Chief Complaint   Patient presents with   • Flank Pain     Pt states he pulled a muscle on his left flank and states it is worse today then it has been in the 2 weeks it has been going on           History of Present Illness:  Patient is a 51 y.o. year old male who presents to the emergency department with left flank/muscle pain for the past couple weeks but worsened this morning while he was trying to get out of bed.  Patient states that it is worse with movement.  He states that he believes it is a muscle and has been taking muscle relaxers which have helped that he has been taking them for the past couple days.  Patient denies dysuria, hematuria, fever, chills, nausea or vomiting.  He denies any history of kidney stones.        HPI        Patient Care Team  Primary Care Provider: Bang Cárdenas MD    Past Medical History:     No Known Allergies  Past Medical History:   Diagnosis Date   • Allergies    • Anxiety 8/17/2021   • Degeneration of lumbar intervertebral disc 02/05/2016   • GERD (gastroesophageal reflux disease)    • Gout    • High blood pressure    • Hypertension    • Impaired fasting glucose 3/15/2022   • Male hypogonadism 7/28/2021   • Mid back pain 02/24/2016   • Obstructive sleep apnea 7/28/2021   • Polycythemia, secondary 3/15/2022    Related to ETOH abuse    • Pulmonary embolism (HCC)    • Tendinitis of elbow 7/16/2021   • Thoracic back pain    • Vitamin D deficiency 7/28/2021     Past Surgical History:   Procedure Laterality Date   • CHOLECYSTECTOMY     • GALLBLADDER SURGERY     • HEMORRHOIDECTOMY     • LUMBAR EPIDURAL INJECTION  2015   • TONSILLECTOMY       Family History   Problem Relation Age of Onset   • Hypertension Mother    • Hypertension Father        Home Medications:  Prior to Admission medications    Medication Sig Start Date End Date Taking? Authorizing Provider   allopurinol (ZYLOPRIM) 100 MG tablet TAKE ONE TABLET BY MOUTH DAILY 11/14/22    Jennifer Hobbs APRN   apixaban (Eliquis) 5 MG tablet tablet Take 1 tablet by mouth Every 12 (Twelve) Hours. 3/29/22   Jennifer Hobbs APRN   atenolol (TENORMIN) 25 MG tablet Take 1 tablet by mouth 2 (Two) Times a Day. 10/7/22   Jennifer Hobbs APRN   cetirizine (zyrTEC) 10 MG tablet Take 10 mg by mouth Daily.    Emergency, Nurse Epic, RN   cyclobenzaprine (FLEXERIL) 10 MG tablet Take 1 tablet by mouth 2 (Two) Times a Day As Needed for Muscle Spasms. 10/5/22   Jennifer Hobbs APRN   fluticasone (Flonase) 50 MCG/ACT nasal spray 2 sprays into the nostril(s) as directed by provider Daily. 3/29/22   Jennifer Hobbs APRN   folic acid (FOLVITE) 1 MG tablet TAKE ONE TABLET BY MOUTH TWICE A DAY 8/8/22   Dony Paez MD   hydroCHLOROthiazide (HYDRODIURIL) 50 MG tablet Take 1 tablet by mouth Daily for 30 days. 3/29/22 4/28/22  Jennifer Hobbs APRN   lisinopril (PRINIVIL,ZESTRIL) 40 MG tablet Take 1 tablet by mouth Daily for 30 days. 12/2/22 1/1/23  Jennifer Hobbs APRN   ondansetron ODT (Zofran ODT) 4 MG disintegrating tablet Place 1 tablet on the tongue Every 8 (Eight) Hours As Needed for Nausea or Vomiting. 5/23/22   Jennifer Hobbs APRN   pantoprazole (PROTONIX) 40 MG EC tablet Take 1 tablet by mouth Daily. 11/23/22   Jennifer Hobbs APRN        Social History:   Social History     Tobacco Use   • Smoking status: Never   • Smokeless tobacco: Current     Types: Chew   Vaping Use   • Vaping Use: Never used   Substance Use Topics   • Alcohol use: Yes     Alcohol/week: 28.0 standard drinks     Types: 28 Standard drinks or equivalent per week   • Drug use: Never         Review of Systems:  Review of Systems   Constitutional: Negative.  Negative for chills and fever.   HENT: Negative.    Eyes: Negative.    Respiratory: Negative.    Cardiovascular: Negative.    Gastrointestinal: Negative.  Negative for nausea and vomiting.   Endocrine: Negative.    Genitourinary: Positive for flank pain. Negative for  "dysuria and hematuria.   Skin: Negative.    Allergic/Immunologic: Negative.    Neurological: Negative.    Hematological: Negative.    Psychiatric/Behavioral: Negative.         Physical Exam:  /58 (BP Location: Left arm, Patient Position: Sitting)   Pulse 72   Temp 97.9 °F (36.6 °C) (Oral)   Resp 20   Ht 174 cm (68.5\")   Wt (!) 158 kg (348 lb 1.7 oz)   SpO2 99%   BMI 52.16 kg/m²     Physical Exam  Vitals and nursing note reviewed.   Constitutional:       Appearance: Normal appearance. He is normal weight.   HENT:      Head: Normocephalic and atraumatic.      Nose: Nose normal.      Mouth/Throat:      Mouth: Mucous membranes are moist.   Eyes:      Extraocular Movements: Extraocular movements intact.      Conjunctiva/sclera: Conjunctivae normal.      Pupils: Pupils are equal, round, and reactive to light.   Cardiovascular:      Rate and Rhythm: Normal rate and regular rhythm.      Heart sounds: Normal heart sounds.   Pulmonary:      Effort: Pulmonary effort is normal.      Breath sounds: Normal breath sounds.   Abdominal:      Tenderness: There is left CVA tenderness. There is no right CVA tenderness.   Musculoskeletal:         General: Normal range of motion.      Cervical back: Normal range of motion and neck supple.        Back:    Skin:     General: Skin is warm and dry.   Neurological:      General: No focal deficit present.      Mental Status: He is alert and oriented to person, place, and time.   Psychiatric:         Mood and Affect: Mood normal.         Behavior: Behavior normal.                Medications in the Emergency Department:  Medications   ketorolac (TORADOL) injection 30 mg (30 mg Intramuscular Given 12/13/22 0751)   dexamethasone (DECADRON) injection 10 mg (10 mg Intramuscular Given 12/13/22 0752)        Labs  Lab Results (last 24 hours)     Procedure Component Value Units Date/Time    Urinalysis With Microscopic If Indicated (No Culture) - Urine, Clean Catch [[id number]]  (Abnormal) " Collected: 12/13/22 0925    Specimen: Urine, Clean Catch Updated: 12/13/22 0944     Color, UA Yellow     Appearance, UA Clear     pH, UA 6.0     Specific Gravity, UA 1.012     Glucose, UA Negative     Ketones, UA Negative     Bilirubin, UA Negative     Blood, UA Trace     Protein, UA Negative     Leuk Esterase, UA Negative     Nitrite, UA Negative     Urobilinogen, UA 1.0 E.U./dL    Urinalysis, Microscopic Only - Urine, Clean Catch [[id number]]  (Abnormal) Collected: 12/13/22 0925    Specimen: Urine, Clean Catch Updated: 12/13/22 0944     RBC, UA 3-5 /HPF      WBC, UA 0-2 /HPF      Bacteria, UA None Seen /HPF      Squamous Epithelial Cells, UA 0-2 /HPF      Hyaline Casts, UA None Seen /LPF      Methodology Automated Microscopy           Imaging:  XR Abdomen KUB    Result Date: 12/13/2022  PROCEDURE: XR ABDOMEN KUB  COMPARISON: [facility], , ABDOMEN FLAT & UPRIGHT, 10/30/2003, 9:26.  INDICATIONS: LEFT FLANK PAIN  FINDINGS: There is a nonspecific but nonobstructive bowel gas pattern.  No pneumatosis, free air, organomegaly or abnormal soft tissue calcification is evident.  Mild colonic stool burden is noted.  Lung bases are clear.  Cholecystectomy.  Mild degenerative endplate spurring in the thoracic and lumbar spine.        1. No acute findings within the abdomen.  No radiopaque urinary tract stone is identified.     RAKESH CURRIE MD       Electronically Signed and Approved By: RAKESH CURRIE MD on 12/13/2022 at 10:52               Procedures:  Procedures    Progress                            The patient was initially evaluated in the triage area where orders were placed. The patient was later dispositioned by Key Rodriguez PA-C.      The patient was advised to stay for completion of workup which includes but is not limited to communication of labs and radiological results, reassessment and plan. The patient was advised that leaving prior to disposition by a provider could result in critical  findings that are not communicated to the patient.     Medical Decision Making:  MDM  Number of Diagnoses or Management Options  Diagnosis management comments: The patient´s symptoms are consistent with musculoskeletal back pain. The patient is now resting comfortably, feels better, is alert, talkative, interactive and in no distress. The repeat examination is unremarkable and benign. The patient is neurologically intact and is ambulatory in the ED. The patient has no fever, no bowel or bladder incontinence, no saddle anesthesia, and is otherwise alert and well appearing. The history, physical exam, and diagnostics (if any) do not suggest the presence of acute spinal epidural abscess, acute spinal epidural bleed, cauda equina syndrome, abdominal aortic aneurysm, aortic dissection or other process requiring further testing, treatment or consultation in the emergency department. The vital signs have been stable. The patient's condition is stable and appropriate for discharge. The patient will pursue further outpatient evaluation with the primary care physician or other designated for consulting position as indicated in the discharge instructions.       Amount and/or Complexity of Data Reviewed  Clinical lab tests: reviewed  Tests in the radiology section of CPT®: ordered and reviewed  Decide to obtain previous medical records or to obtain history from someone other than the patient: yes  Independent visualization of images, tracings, or specimens: yes    Risk of Complications, Morbidity, and/or Mortality  Presenting problems: low  Diagnostic procedures: low  Management options: low    Patient Progress  Patient progress: stable           The following orders were placed after triage and evaluation:  Orders Placed This Encounter   Procedures   • XR Abdomen KUB   • Urinalysis With Microscopic If Indicated (No Culture) - Urine, Clean Catch   • Urinalysis, Microscopic Only - Urine, Clean Catch   • PT Consult: Eval & Treat  Functional Mobility Below Baseline   • PT Plan of Care Cert / Re-Cert       Final diagnoses:   Musculoskeletal back pain          Disposition:  ED Disposition     ED Disposition   Discharge    Condition   Stable    Comment   --             This medical record created using voice recognition software.           Key Rodriguez PA-C  12/13/22 [id number]

## 2022-12-14 DIAGNOSIS — I10 ESSENTIAL HYPERTENSION: ICD-10-CM

## 2022-12-14 RX ORDER — ALLOPURINOL 100 MG/1
TABLET ORAL
Qty: 30 TABLET | Refills: 0 | Status: SHIPPED | OUTPATIENT
Start: 2022-12-14 | End: 2022-12-19 | Stop reason: SDUPTHER

## 2022-12-14 RX ORDER — HYDROCHLOROTHIAZIDE 50 MG/1
50 TABLET ORAL DAILY
Qty: 30 TABLET | Refills: 5 | Status: SHIPPED | OUTPATIENT
Start: 2022-12-14 | End: 2022-12-19 | Stop reason: SDUPTHER

## 2022-12-14 RX ORDER — FOLIC ACID 1 MG/1
TABLET ORAL
Qty: 60 TABLET | Refills: 3 | Status: SHIPPED | OUTPATIENT
Start: 2022-12-14

## 2022-12-14 NOTE — TELEPHONE ENCOUNTER
Caller: Kevin Bradshaw    Relationship: Self    Best call back number: [phone]    Requested Prescriptions:   Requested Prescriptions     Pending Prescriptions Disp Refills   • hydroCHLOROthiazide (HYDRODIURIL) 50 MG tablet 30 tablet 5     Sig: Take 1 tablet by mouth Daily for 30 days.        Pharmacy where request should be sent: [facility] [id number] [place], [place] - [address] AT [facility] [address] [phone] [facility] [phone] FX     Additional details provided by patient: PATIENT IS TOTALLY OUT OF MEDICATION AT THIS TIME     Does the patient have less than a 3 day supply:  [x] Yes  [] No    Would you like a call back once the refill request has been completed: [x] Yes [] No    If the office needs to give you a call back, can they leave a voicemail: [x] Yes [] No    Toby Phan Rep   12/14/22 10:18 EST

## 2022-12-15 NOTE — PROGRESS NOTES
Chief Complaint  Annual Exam (Patient was in the ER next, pulled muscle in back. Off all last week. )  Subjective        History of Present Illness  Kevin ENMA Bradshaw presents to [facility] for:     1. His annual physical exam. He has lost 31 lbs since January.     2. Follow-up of hypertension, history of pulmonary embolus, vitamin D deficiency, impaired fasting glucose and allergies. The patient is not having any medication side effects. Mild shortness of air post Covid infection in January. Following with Dr. Camarena and Dr. Pozo.    Current Outpatient Medications:   •  allopurinol (ZYLOPRIM) 100 MG tablet, Take 1 tablet by mouth Daily., Disp: 30 tablet, Rfl: 5  •  apixaban (Eliquis) 5 MG tablet tablet, Take 1 tablet by mouth Every 12 (Twelve) Hours., Disp: 60 tablet, Rfl: 5  •  atenolol (TENORMIN) 25 MG tablet, Take 1 tablet by mouth 2 (Two) Times a Day., Disp: 60 tablet, Rfl: 5  •  baclofen (LIORESAL) 10 MG tablet, Take 1 tablet by mouth 3 (Three) Times a Day for 7 days., Disp: 21 tablet, Rfl: 0  •  cetirizine (zyrTEC) 10 MG tablet, Take 10 mg by mouth Daily., Disp: , Rfl:   •  cyclobenzaprine (FLEXERIL) 10 MG tablet, Take 1 tablet by mouth 2 (Two) Times a Day As Needed for Muscle Spasms., Disp: 30 tablet, Rfl: 5  •  fluticasone (Flonase) 50 MCG/ACT nasal spray, 2 sprays into the nostril(s) as directed by provider Daily., Disp: 9.9 g, Rfl: 3  •  folic acid (FOLVITE) 1 MG tablet, TAKE ONE TABLET BY MOUTH TWICE A DAY, Disp: 60 tablet, Rfl: 3  •  hydroCHLOROthiazide (HYDRODIURIL) 50 MG tablet, Take 1 tablet by mouth Daily for 30 days., Disp: 30 tablet, Rfl: 5  •  lisinopril (PRINIVIL,ZESTRIL) 40 MG tablet, Take 1 tablet by mouth Daily for 30 days., Disp: 30 tablet, Rfl: 5  •  ondansetron ODT (Zofran ODT) 4 MG disintegrating tablet, Place 1 tablet on the tongue Every 8 (Eight) Hours As Needed for Nausea or Vomiting., Disp: 12 tablet, Rfl: 1  •  pantoprazole (PROTONIX) 40 MG EC  "tablet, Take 1 tablet by mouth Daily., Disp: 30 tablet, Rfl: 5  No Known Allergies   Past Medical History:   Diagnosis Date   • Allergies    • Anxiety 8/17/2021   • Degeneration of lumbar intervertebral disc 02/05/2016   • GERD (gastroesophageal reflux disease)    • Gout    • High blood pressure    • Hypertension    • Impaired fasting glucose 3/15/2022   • Male hypogonadism 7/28/2021   • Mid back pain 02/24/2016   • Obstructive sleep apnea 7/28/2021   • Polycythemia, secondary 3/15/2022    Related to ETOH abuse    • Pulmonary embolism (HCC)    • Tendinitis of elbow 7/16/2021   • Thoracic back pain    • Vitamin D deficiency 7/28/2021      Past Surgical History:   Procedure Laterality Date   • CHOLECYSTECTOMY     • GALLBLADDER SURGERY     • HEMORRHOIDECTOMY     • LUMBAR EPIDURAL INJECTION  2015   • TONSILLECTOMY        Objective   /70 (BP Location: Right arm, Patient Position: Sitting, Cuff Size: Large Adult)   Pulse 86   Temp 98.2 °F (36.8 °C) (Temporal)   Ht 174 cm (68.5\")   Wt (!) 156 kg (344 lb 12.8 oz)   SpO2 97%   BMI 51.66 kg/m²    Estimated body mass index is 51.66 kg/m² as calculated from the following:    Height as of this encounter: 174 cm (68.5\").    Weight as of this encounter: 156 kg (344 lb 12.8 oz).   Physical Exam  Vitals reviewed.   Constitutional:       General: He is not in acute distress.     Appearance: He is obese.   HENT:      Head: Normocephalic and atraumatic.   Eyes:      Conjunctiva/sclera: Conjunctivae normal.   Cardiovascular:      Rate and Rhythm: Normal rate and regular rhythm.      Heart sounds: Normal heart sounds. No murmur heard.  Pulmonary:      Effort: Pulmonary effort is normal.      Breath sounds: Normal breath sounds. No wheezing, rhonchi or rales.   Abdominal:      General: There is no distension.      Palpations: Abdomen is soft. There is no mass.      Tenderness: There is no abdominal tenderness.   Musculoskeletal:      Right lower leg: No edema.      Left lower " leg: No edema.   Lymphadenopathy:      Cervical: No cervical adenopathy.   Skin:     General: Skin is warm and dry.      Coloration: Skin is not jaundiced or pale.   Neurological:      General: No focal deficit present.      Mental Status: He is alert.   Psychiatric:         Mood and Affect: Mood normal.         Thought Content: Thought content normal.        Result Review :                   Assessment and Plan    Diagnoses and all orders for this visit:    1. Annual physical exam (Primary)    2. Essential hypertension    3. History of pulmonary embolus (PE)    4. Gastro-esophageal reflux disease without esophagitis    5. Seasonal allergic rhinitis, unspecified trigger    6. Chronic gouty arthritis    7. Impaired fasting glucose    8. Vitamin D deficiency    Other orders  -     allopurinol (ZYLOPRIM) 100 MG tablet; Take 1 tablet by mouth Daily.  Dispense: 30 tablet; Refill: 5  -     apixaban (Eliquis) 5 MG tablet tablet; Take 1 tablet by mouth Every 12 (Twelve) Hours.  Dispense: 60 tablet; Refill: 5  -     lisinopril (PRINIVIL,ZESTRIL) 40 MG tablet; Take 1 tablet by mouth Daily for 30 days.  Dispense: 30 tablet; Refill: 5  -     atenolol (TENORMIN) 25 MG tablet; Take 1 tablet by mouth 2 (Two) Times a Day.  Dispense: 60 tablet; Refill: 5  -     hydroCHLOROthiazide (HYDRODIURIL) 50 MG tablet; Take 1 tablet by mouth Daily for 30 days.  Dispense: 30 tablet; Refill: 5      1. Annual physical exam: Discussed healthy diet, exercise, adequate sleep, cancer screening, immunizations, and preventative care. Annual eye exam and twice yearly dental cleaning.     2. Hypertension: Stable on lisinopril 40 mg daily, HCTZ 50 mg daily and atenolol 25 mg twice a daily and to continue.    3. History of PE: Stable on Eliquis 5 mg twice a day and to continue.    4. GERD: Stable on Protonix 40 mg daily and to continue.    5. Allergies: Stable on Zyrtec 10 mg daily as needed and fluticasone nasal spray as needed and to continue.    6.  Chronic gout: Stable on allopurinol 100 mg daily and to continue.    7. Pre-diabetes: Monitor.    8. Vitamin D deficiency: Monitor.    Follow Up     Patient was given instructions and counseling regarding his condition or for health maintenance advice. Please see specific information pulled into the AVS if appropriate.   Return in about 6 months (around 6/19/2023) for Recheck.    AIDAN Finnegan

## 2022-12-19 ENCOUNTER — OFFICE VISIT (OUTPATIENT)
Dept: INTERNAL MEDICINE | Facility: CLINIC | Age: 52
End: 2022-12-19

## 2022-12-19 VITALS
TEMPERATURE: 98.2 F | HEIGHT: 69 IN | BODY MASS INDEX: 46.65 KG/M2 | DIASTOLIC BLOOD PRESSURE: 70 MMHG | SYSTOLIC BLOOD PRESSURE: 119 MMHG | WEIGHT: 315 LBS | HEART RATE: 86 BPM | OXYGEN SATURATION: 97 %

## 2022-12-19 DIAGNOSIS — M1A.00X0 CHRONIC GOUTY ARTHRITIS: ICD-10-CM

## 2022-12-19 DIAGNOSIS — I10 ESSENTIAL HYPERTENSION: ICD-10-CM

## 2022-12-19 DIAGNOSIS — R73.01 IMPAIRED FASTING GLUCOSE: ICD-10-CM

## 2022-12-19 DIAGNOSIS — J30.2 SEASONAL ALLERGIC RHINITIS, UNSPECIFIED TRIGGER: ICD-10-CM

## 2022-12-19 DIAGNOSIS — K21.9 GASTRO-ESOPHAGEAL REFLUX DISEASE WITHOUT ESOPHAGITIS: ICD-10-CM

## 2022-12-19 DIAGNOSIS — Z00.00 ANNUAL PHYSICAL EXAM: Primary | ICD-10-CM

## 2022-12-19 DIAGNOSIS — E55.9 VITAMIN D DEFICIENCY: ICD-10-CM

## 2022-12-19 DIAGNOSIS — Z86.711 HISTORY OF PULMONARY EMBOLUS (PE): ICD-10-CM

## 2022-12-19 PROCEDURE — 99396 PREV VISIT EST AGE 40-64: CPT | Performed by: NURSE PRACTITIONER

## 2022-12-19 RX ORDER — ALLOPURINOL 100 MG/1
100 TABLET ORAL DAILY
Qty: 30 TABLET | Refills: 5 | Status: SHIPPED | OUTPATIENT
Start: 2022-12-19

## 2022-12-19 RX ORDER — LISINOPRIL 40 MG/1
40 TABLET ORAL DAILY
Qty: 30 TABLET | Refills: 5 | Status: SHIPPED | OUTPATIENT
Start: 2022-12-19 | End: 2023-01-18

## 2022-12-19 RX ORDER — ATENOLOL 25 MG/1
25 TABLET ORAL 2 TIMES DAILY
Qty: 60 TABLET | Refills: 5 | Status: SHIPPED | OUTPATIENT
Start: 2022-12-19

## 2022-12-19 RX ORDER — HYDROCHLOROTHIAZIDE 50 MG/1
50 TABLET ORAL DAILY
Qty: 30 TABLET | Refills: 5 | Status: SHIPPED | OUTPATIENT
Start: 2022-12-19 | End: 2023-01-18

## 2023-04-03 NOTE — PROGRESS NOTES
CHIEF COMPLAINT  Kevin Bradshaw presents to [facility] for follow-up of Fatigue (52 year old male here today complaining of fatigue X 2-3 months. States he drives 100 + miles per day. ).    HPI  Complaint of fatigue for the past 2 to 3 months- no recent labs seen-last vitamin D was done August 2021 and was low at 13, glucose normal at 101.  Sleeps 8 pm and wakes up at 5:30 pm-    Obstructive sleep apnea-using CPAP machine    Status post viral infection-had diarrhea-6-12 x/day for 4 days-and finished 1 weeks ago-No N/V--Eating ok  Drinks 2-3 beers nightly    Hypogonadism-low testosterone last lab draw once redrawn again.    SH-drives 110 miles daily-works at [employer]-no cigs-drinks 2-3 beers daily-daughter lives with pt-not exercising-no wheezing-    Current Outpatient Medications:   •  allopurinol (ZYLOPRIM) 100 MG tablet, Take 1 tablet by mouth Daily., Disp: 30 tablet, Rfl: 5  •  apixaban (Eliquis) 5 MG tablet tablet, Take 1 tablet by mouth Every 12 (Twelve) Hours., Disp: 60 tablet, Rfl: 5  •  atenolol (TENORMIN) 25 MG tablet, Take 1 tablet by mouth 2 (Two) Times a Day., Disp: 60 tablet, Rfl: 5  •  cetirizine (zyrTEC) 10 MG tablet, Take 1 tablet by mouth Daily., Disp: , Rfl:   •  cyclobenzaprine (FLEXERIL) 10 MG tablet, Take 1 tablet by mouth 2 (Two) Times a Day As Needed for Muscle Spasms., Disp: 30 tablet, Rfl: 5  •  fluticasone (Flonase) 50 MCG/ACT nasal spray, 2 sprays into the nostril(s) as directed by provider Daily., Disp: 9.9 g, Rfl: 3  •  folic acid (FOLVITE) 1 MG tablet, TAKE ONE TABLET BY MOUTH TWICE A DAY, Disp: 60 tablet, Rfl: 3  •  ondansetron ODT (Zofran ODT) 4 MG disintegrating tablet, Place 1 tablet on the tongue Every 8 (Eight) Hours As Needed for Nausea or Vomiting., Disp: 12 tablet, Rfl: 1  •  pantoprazole (PROTONIX) 40 MG EC tablet, Take 1 tablet by mouth Daily., Disp: 30 tablet, Rfl: 5  •  Cholecalciferol (Vitamin D3) 50 MCG (2000 UT)  "capsule, Take 1 capsule by mouth Daily., Disp: 90 capsule, Rfl: 1  •  hydroCHLOROthiazide (HYDRODIURIL) 50 MG tablet, Take 1 tablet by mouth Daily for 30 days., Disp: 30 tablet, Rfl: 5  •  lisinopril (PRINIVIL,ZESTRIL) 40 MG tablet, Take 1 tablet by mouth Daily for 30 days., Disp: 30 tablet, Rfl: 5  •  vitamin D (ERGOCALCIFEROL) 1.25 MG (42501 UT) capsule capsule, Take 1 capsule by mouth 1 (One) Time Per Week. For 3 months then start vitamin D3 2000 units daily, Disp: 12 capsule, Rfl: 0   PFSH reviewed.  ROS -fatigue    OBJECTIVE  Vital Signs  Vitals:    04/04/23 1055   BP: 142/84   BP Location: Left arm   Patient Position: Sitting   Pulse: 87   Resp: 20   Temp: 97.6 °F (36.4 °C)   TempSrc: Temporal   SpO2: 95%   Weight: (!) 150 kg (330 lb 3.2 oz)   Height: 174 cm (68.5\")      Body mass index is 49.48 kg/m².    Physical Exam  Vitals and nursing note reviewed.   Constitutional:       Appearance: Normal appearance. He is obese.   HENT:      Head: Normocephalic and atraumatic.      Nose: Nose normal.   Eyes:      Extraocular Movements: Extraocular movements intact.      Pupils: Pupils are equal, round, and reactive to light.   Cardiovascular:      Rate and Rhythm: Normal rate and regular rhythm.   Pulmonary:      Effort: Pulmonary effort is normal.      Breath sounds: Normal breath sounds.   Abdominal:      General: Abdomen is flat.      Palpations: Abdomen is soft.   Musculoskeletal:      Cervical back: Normal range of motion and neck supple.   Skin:     General: Skin is warm and dry.   Neurological:      General: No focal deficit present.      Mental Status: He is alert and oriented to person, place, and time.   Psychiatric:         Mood and Affect: Mood normal.         Behavior: Behavior normal.          RESULTS REVIEW  Lab Results   Component Value Date     03/22/2021    BNP 53 01/27/2021     CMP    CMP 4/4/23   Glucose 107 (A)   BUN 7   Creatinine 0.78   eGFR 108.0   Sodium 139   Potassium 4.5   Chloride " 101   Calcium 9.9   Total Protein 7.5   Albumin 4.0   Globulin 3.5   Total Bilirubin 0.9   Alkaline Phosphatase 66   AST (SGOT) 28   ALT (SGPT) 25   Albumin/Globulin Ratio 1.1   BUN/Creatinine Ratio 9.0   Anion Gap 11.5   (A) Abnormal value               Lipid Panel    Lipid Panel 4/4/23   Total Cholesterol 169   Triglycerides 95   HDL Cholesterol 49   VLDL Cholesterol 17   LDL Cholesterol  103 (A)   LDL/HDL Ratio 2.06   (A) Abnormal value             Lab Results   Component Value Date    TSH 1.640 04/04/2023    TSH 4.120 08/14/2021    TSH 3.800 01/16/2021      Lab Results   Component Value Date    FREET4 1.23 04/04/2023    FREET4 1.27 08/14/2021    FREET4 1.3 01/16/2021      A1C Last 3 Results    HGBA1C Last 3 Results 4/4/23   Hemoglobin A1C 5.90 (A)   (A) Abnormal value             Lab Results   Component Value Date    EWQFNFFP09 579 04/04/2023    DRAY26SQ 11.6 (L) 04/04/2023    MG 1.6 04/04/2023        XR Abdomen KUB    Result Date: 12/13/2022   1. No acute findings within the abdomen.  No radiopaque urinary tract stone is identified.     RAKESH CURRIE MD       Electronically Signed and Approved By: RAKESH CURRIE MD on 12/13/2022 at 10:52                        ASSESSMENT & PLAN  Diagnoses and all orders for this visit:    1. Other fatigue (Primary)  Assessment & Plan:  Patient has multiple etiologies of fatigue.  Last vitamin D levels to 3 years ago was low so fatigue may be from low vitamin D/sleep apnea/hypogonadism/obesity- need to recheck vitamin D B12 thyroid studies and CBC.  Also electrolytes since had recent viral infection with diarrhea and may have low potassium.  Patient has not had any weight loss but is due for his for screening colonoscopy.    Follow-up in 2 weeks with PCP- Will inform patient of any significant abnormalities in his lab draw.    Orders:  -     Comprehensive Metabolic Panel; Future  -     Lipid Panel; Future  -     CBC & Differential; Future  -     Vitamin B12; Future  -     Folate;  Future  -     Vitamin D,25-Hydroxy; Future  -     Magnesium; Future  -     Microalbumin / Creatinine Urine Ratio - Urine, Clean Catch; Future  -     Hemoglobin A1c; Future  -     TSH+Free T4; Future  -     T3, Free; Future  -     Comprehensive Metabolic Panel  -     Lipid Panel  -     CBC & Differential  -     Vitamin B12  -     Folate  -     Vitamin D,25-Hydroxy  -     Magnesium  -     Hemoglobin A1c  -     TSH+Free T4  -     T3, Free    2. Essential hypertension  Assessment & Plan:  Hypertension is stable.    Plan-continue with atenolol 25 mg 1 twice daily hydrochlorothiazide 50 mg 1 daily and lisinopril 40 mg 1 daily.    Orders:  -     Comprehensive Metabolic Panel; Future  -     Lipid Panel; Future  -     CBC & Differential; Future  -     Vitamin B12; Future  -     Folate; Future  -     Vitamin D,25-Hydroxy; Future  -     Magnesium; Future  -     Microalbumin / Creatinine Urine Ratio - Urine, Clean Catch; Future  -     Hemoglobin A1c; Future  -     TSH+Free T4; Future  -     T3, Free; Future  -     Comprehensive Metabolic Panel  -     Lipid Panel  -     CBC & Differential  -     Vitamin B12  -     Folate  -     Vitamin D,25-Hydroxy  -     Magnesium  -     Hemoglobin A1c  -     TSH+Free T4  -     T3, Free    3. Impaired fasting glucose  Assessment & Plan:  Check A1c hello patient has not had any nocturia or polyuria.    Orders:  -     Comprehensive Metabolic Panel; Future  -     Lipid Panel; Future  -     CBC & Differential; Future  -     Vitamin B12; Future  -     Folate; Future  -     Vitamin D,25-Hydroxy; Future  -     Magnesium; Future  -     Microalbumin / Creatinine Urine Ratio - Urine, Clean Catch; Future  -     Hemoglobin A1c; Future  -     TSH+Free T4; Future  -     T3, Free; Future  -     Comprehensive Metabolic Panel  -     Lipid Panel  -     CBC & Differential  -     Vitamin B12  -     Folate  -     Vitamin D,25-Hydroxy  -     Magnesium  -     Hemoglobin A1c  -     TSH+Free T4  -     T3, Free    4.  Male hypogonadism  -     Comprehensive Metabolic Panel; Future  -     Lipid Panel; Future  -     CBC & Differential; Future  -     Vitamin B12; Future  -     Folate; Future  -     Vitamin D,25-Hydroxy; Future  -     Magnesium; Future  -     Microalbumin / Creatinine Urine Ratio - Urine, Clean Catch; Future  -     Hemoglobin A1c; Future  -     TSH+Free T4; Future  -     T3, Free; Future  -     Testosterone, Free, Total; Future  -     Testosterone, Free, Total  -     Comprehensive Metabolic Panel  -     Lipid Panel  -     CBC & Differential  -     Vitamin B12  -     Folate  -     Vitamin D,25-Hydroxy  -     Magnesium  -     Hemoglobin A1c  -     TSH+Free T4  -     T3, Free    5. Vitamin D deficiency  -     Comprehensive Metabolic Panel; Future  -     Lipid Panel; Future  -     CBC & Differential; Future  -     Vitamin B12; Future  -     Folate; Future  -     Vitamin D,25-Hydroxy; Future  -     Magnesium; Future  -     Microalbumin / Creatinine Urine Ratio - Urine, Clean Catch; Future  -     Hemoglobin A1c; Future  -     TSH+Free T4; Future  -     T3, Free; Future  -     Comprehensive Metabolic Panel  -     Lipid Panel  -     CBC & Differential  -     Vitamin B12  -     Folate  -     Vitamin D,25-Hydroxy  -     Magnesium  -     Hemoglobin A1c  -     TSH+Free T4  -     T3, Free  -     vitamin D (ERGOCALCIFEROL) 1.25 MG (47122 UT) capsule capsule; Take 1 capsule by mouth 1 (One) Time Per Week. For 3 months then start vitamin D3 2000 units daily  Dispense: 12 capsule; Refill: 0  -     Cholecalciferol (Vitamin D3) 50 MCG (2000 UT) capsule; Take 1 capsule by mouth Daily.  Dispense: 90 capsule; Refill: 1    6. Obstructive sleep apnea  -     Comprehensive Metabolic Panel; Future  -     Lipid Panel; Future  -     CBC & Differential; Future  -     Vitamin B12; Future  -     Folate; Future  -     Vitamin D,25-Hydroxy; Future  -     Magnesium; Future  -     Microalbumin / Creatinine Urine Ratio - Urine, Clean Catch; Future  -      Hemoglobin A1c; Future  -     TSH+Free T4; Future  -     T3, Free; Future  -     Comprehensive Metabolic Panel  -     Lipid Panel  -     CBC & Differential  -     Vitamin B12  -     Folate  -     Vitamin D,25-Hydroxy  -     Magnesium  -     Hemoglobin A1c  -     TSH+Free T4  -     T3, Free    7. Acute idiopathic gout, unspecified site  -     Uric acid; Future  -     Uric acid    8. Morbid (severe) obesity due to excess calories    9. Screen for colon cancer  -     Ambulatory Referral For Screening Colonoscopy      Class 3 Severe Obesity (BMI >=40). Obesity-related health conditions include the following: obstructive sleep apnea, hypertension and osteoarthritis. Obesity is unchanged. BMI is is above average; BMI management plan is completed. We discussed low calorie, low carb based diet program, portion control and increasing exercise.      Addendum 4/5/2023  I spoke with patient regarding lab work specially the low vitamin D less than 12- has no problems with diarrhea-will give high-dose vitamin D 3 50,000 units once a week for 3 months then start vitamin D 2000 units daily  A1c equals 5.9 consistent with prediabetes spoke again with patient that he needs to follow lifestyle changes- cut back on his carbs specially pasta rice and bread.  Keep appointment with Jennifer as scheduled.    FOLLOW UP  Return in about 2 weeks (around 4/18/2023), or if symptoms worsen or fail to improve, for Next scheduled follow up, Recheck.    Patient was given instructions and counseling regarding his condition or for health maintenance advice. Please see specific information pulled into the AVS if appropriate.

## 2023-04-04 ENCOUNTER — OFFICE VISIT (OUTPATIENT)
Dept: INTERNAL MEDICINE | Facility: CLINIC | Age: 53
End: 2023-04-04
Payer: MEDICAID

## 2023-04-04 VITALS
HEIGHT: 69 IN | SYSTOLIC BLOOD PRESSURE: 142 MMHG | BODY MASS INDEX: 46.65 KG/M2 | OXYGEN SATURATION: 95 % | RESPIRATION RATE: 20 BRPM | HEART RATE: 87 BPM | TEMPERATURE: 97.6 F | WEIGHT: 315 LBS | DIASTOLIC BLOOD PRESSURE: 84 MMHG

## 2023-04-04 DIAGNOSIS — R73.01 IMPAIRED FASTING GLUCOSE: ICD-10-CM

## 2023-04-04 DIAGNOSIS — E55.9 VITAMIN D DEFICIENCY: ICD-10-CM

## 2023-04-04 DIAGNOSIS — E29.1 MALE HYPOGONADISM: ICD-10-CM

## 2023-04-04 DIAGNOSIS — I10 ESSENTIAL HYPERTENSION: ICD-10-CM

## 2023-04-04 DIAGNOSIS — Z12.11 SCREEN FOR COLON CANCER: ICD-10-CM

## 2023-04-04 DIAGNOSIS — E66.01 MORBID (SEVERE) OBESITY DUE TO EXCESS CALORIES: ICD-10-CM

## 2023-04-04 DIAGNOSIS — M10.00 ACUTE IDIOPATHIC GOUT, UNSPECIFIED SITE: ICD-10-CM

## 2023-04-04 DIAGNOSIS — R53.83 OTHER FATIGUE: Primary | ICD-10-CM

## 2023-04-04 DIAGNOSIS — G47.33 OBSTRUCTIVE SLEEP APNEA: ICD-10-CM

## 2023-04-04 LAB
25(OH)D3 SERPL-MCNC: 11.6 NG/ML (ref 30–100)
ALBUMIN SERPL-MCNC: 4 G/DL (ref 3.5–5.2)
ALBUMIN/GLOB SERPL: 1.1 G/DL
ALP SERPL-CCNC: 66 U/L (ref 39–117)
ALT SERPL W P-5'-P-CCNC: 25 U/L (ref 1–41)
ANION GAP SERPL CALCULATED.3IONS-SCNC: 11.5 MMOL/L (ref 5–15)
AST SERPL-CCNC: 28 U/L (ref 1–40)
BASOPHILS # BLD AUTO: 0.06 10*3/MM3 (ref 0–0.2)
BASOPHILS NFR BLD AUTO: 0.6 % (ref 0–1.5)
BILIRUB SERPL-MCNC: 0.9 MG/DL (ref 0–1.2)
BUN SERPL-MCNC: 7 MG/DL (ref 6–20)
BUN/CREAT SERPL: 9 (ref 7–25)
CALCIUM SPEC-SCNC: 9.9 MG/DL (ref 8.6–10.5)
CHLORIDE SERPL-SCNC: 101 MMOL/L (ref 98–107)
CHOLEST SERPL-MCNC: 169 MG/DL (ref 0–200)
CO2 SERPL-SCNC: 26.5 MMOL/L (ref 22–29)
CREAT SERPL-MCNC: 0.78 MG/DL (ref 0.76–1.27)
DEPRECATED RDW RBC AUTO: 40.5 FL (ref 37–54)
EGFRCR SERPLBLD CKD-EPI 2021: 108 ML/MIN/1.73
EOSINOPHIL # BLD AUTO: 0.09 10*3/MM3 (ref 0–0.4)
EOSINOPHIL NFR BLD AUTO: 0.8 % (ref 0.3–6.2)
ERYTHROCYTE [DISTWIDTH] IN BLOOD BY AUTOMATED COUNT: 12.6 % (ref 12.3–15.4)
FOLATE SERPL-MCNC: >20 NG/ML (ref 4.78–24.2)
GLOBULIN UR ELPH-MCNC: 3.5 GM/DL
GLUCOSE SERPL-MCNC: 107 MG/DL (ref 65–99)
HBA1C MFR BLD: 5.9 % (ref 4.8–5.6)
HCT VFR BLD AUTO: 52 % (ref 37.5–51)
HDLC SERPL-MCNC: 49 MG/DL (ref 40–60)
HGB BLD-MCNC: 17.6 G/DL (ref 13–17.7)
IMM GRANULOCYTES # BLD AUTO: 0.06 10*3/MM3 (ref 0–0.05)
IMM GRANULOCYTES NFR BLD AUTO: 0.6 % (ref 0–0.5)
LDLC SERPL CALC-MCNC: 103 MG/DL (ref 0–100)
LDLC/HDLC SERPL: 2.06 {RATIO}
LYMPHOCYTES # BLD AUTO: 1.7 10*3/MM3 (ref 0.7–3.1)
LYMPHOCYTES NFR BLD AUTO: 15.7 % (ref 19.6–45.3)
MAGNESIUM SERPL-MCNC: 1.6 MG/DL (ref 1.6–2.6)
MCH RBC QN AUTO: 30.3 PG (ref 26.6–33)
MCHC RBC AUTO-ENTMCNC: 33.8 G/DL (ref 31.5–35.7)
MCV RBC AUTO: 89.5 FL (ref 79–97)
MONOCYTES # BLD AUTO: 1.2 10*3/MM3 (ref 0.1–0.9)
MONOCYTES NFR BLD AUTO: 11.1 % (ref 5–12)
NEUTROPHILS NFR BLD AUTO: 7.74 10*3/MM3 (ref 1.7–7)
NEUTROPHILS NFR BLD AUTO: 71.2 % (ref 42.7–76)
NRBC BLD AUTO-RTO: 0 /100 WBC (ref 0–0.2)
PLATELET # BLD AUTO: 353 10*3/MM3 (ref 140–450)
PMV BLD AUTO: 10 FL (ref 6–12)
POTASSIUM SERPL-SCNC: 4.5 MMOL/L (ref 3.5–5.2)
PROT SERPL-MCNC: 7.5 G/DL (ref 6–8.5)
RBC # BLD AUTO: 5.81 10*6/MM3 (ref 4.14–5.8)
SODIUM SERPL-SCNC: 139 MMOL/L (ref 136–145)
T3FREE SERPL-MCNC: 2.65 PG/ML (ref 2–4.4)
T4 FREE SERPL-MCNC: 1.23 NG/DL (ref 0.93–1.7)
TRIGL SERPL-MCNC: 95 MG/DL (ref 0–150)
TSH SERPL DL<=0.05 MIU/L-ACNC: 1.64 UIU/ML (ref 0.27–4.2)
URATE SERPL-MCNC: 6.8 MG/DL (ref 3.4–7)
VIT B12 BLD-MCNC: 579 PG/ML (ref 211–946)
VLDLC SERPL-MCNC: 17 MG/DL (ref 5–40)
WBC NRBC COR # BLD: 10.85 10*3/MM3 (ref 3.4–10.8)

## 2023-04-04 PROCEDURE — 82607 VITAMIN B-12: CPT | Performed by: INTERNAL MEDICINE

## 2023-04-04 PROCEDURE — 84550 ASSAY OF BLOOD/URIC ACID: CPT | Performed by: INTERNAL MEDICINE

## 2023-04-04 PROCEDURE — 82746 ASSAY OF FOLIC ACID SERUM: CPT | Performed by: INTERNAL MEDICINE

## 2023-04-04 PROCEDURE — 84439 ASSAY OF FREE THYROXINE: CPT | Performed by: INTERNAL MEDICINE

## 2023-04-04 PROCEDURE — 84403 ASSAY OF TOTAL TESTOSTERONE: CPT | Performed by: INTERNAL MEDICINE

## 2023-04-04 PROCEDURE — 83036 HEMOGLOBIN GLYCOSYLATED A1C: CPT | Performed by: INTERNAL MEDICINE

## 2023-04-04 PROCEDURE — 83735 ASSAY OF MAGNESIUM: CPT | Performed by: INTERNAL MEDICINE

## 2023-04-04 PROCEDURE — 84481 FREE ASSAY (FT-3): CPT | Performed by: INTERNAL MEDICINE

## 2023-04-04 PROCEDURE — 82306 VITAMIN D 25 HYDROXY: CPT | Performed by: INTERNAL MEDICINE

## 2023-04-04 PROCEDURE — 84402 ASSAY OF FREE TESTOSTERONE: CPT | Performed by: INTERNAL MEDICINE

## 2023-04-04 PROCEDURE — 80050 GENERAL HEALTH PANEL: CPT | Performed by: INTERNAL MEDICINE

## 2023-04-04 PROCEDURE — 80061 LIPID PANEL: CPT | Performed by: INTERNAL MEDICINE

## 2023-04-04 NOTE — ASSESSMENT & PLAN NOTE
Patient has multiple etiologies of fatigue.  Last vitamin D levels to 3 years ago was low so fatigue may be from low vitamin D/sleep apnea/hypogonadism/obesity- need to recheck vitamin D B12 thyroid studies and CBC.  Also electrolytes since had recent viral infection with diarrhea and may have low potassium.  Patient has not had any weight loss but is due for his for screening colonoscopy.    Follow-up in 2 weeks with PCP- Will inform patient of any significant abnormalities in his lab draw.

## 2023-04-04 NOTE — ASSESSMENT & PLAN NOTE
Form completed and faxed to  forms completion   Hypertension is stable.    Plan-continue with atenolol 25 mg 1 twice daily hydrochlorothiazide 50 mg 1 daily and lisinopril 40 mg 1 daily.

## 2023-04-05 RX ORDER — ERGOCALCIFEROL 1.25 MG/1
50000 CAPSULE ORAL WEEKLY
Qty: 12 CAPSULE | Refills: 0 | Status: SHIPPED | OUTPATIENT
Start: 2023-04-05

## 2023-04-05 RX ORDER — ACETAMINOPHEN 160 MG
2000 TABLET,DISINTEGRATING ORAL DAILY
Qty: 90 CAPSULE | Refills: 1 | Status: SHIPPED | OUTPATIENT
Start: 2023-04-05 | End: 2024-04-04
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
## 2023-04-07 LAB
TESTOST FREE SERPL-MCNC: 7.5 PG/ML (ref 7.2–24)
TESTOST SERPL-MCNC: 232 NG/DL (ref 264–916)

## 2023-04-13 RX ORDER — FOLIC ACID 1 MG/1
TABLET ORAL
Qty: 60 TABLET | Refills: 3 | Status: SHIPPED | OUTPATIENT
Start: 2023-04-13

## 2023-04-18 ENCOUNTER — OFFICE VISIT (OUTPATIENT)
Dept: INTERNAL MEDICINE | Facility: CLINIC | Age: 53
End: 2023-04-18
Payer: MEDICAID

## 2023-04-18 VITALS
HEART RATE: 73 BPM | DIASTOLIC BLOOD PRESSURE: 80 MMHG | SYSTOLIC BLOOD PRESSURE: 115 MMHG | WEIGHT: 315 LBS | OXYGEN SATURATION: 97 % | TEMPERATURE: 97.5 F | BODY MASS INDEX: 46.65 KG/M2 | HEIGHT: 69 IN

## 2023-04-18 DIAGNOSIS — R53.83 FATIGUE, UNSPECIFIED TYPE: Primary | ICD-10-CM

## 2023-04-18 DIAGNOSIS — E55.9 VITAMIN D DEFICIENCY: ICD-10-CM

## 2023-04-18 DIAGNOSIS — K62.89 RECTAL PAIN: ICD-10-CM

## 2023-04-18 PROCEDURE — 3079F DIAST BP 80-89 MM HG: CPT | Performed by: NURSE PRACTITIONER

## 2023-04-18 PROCEDURE — 1160F RVW MEDS BY RX/DR IN RCRD: CPT | Performed by: NURSE PRACTITIONER

## 2023-04-18 PROCEDURE — 99214 OFFICE O/P EST MOD 30 MIN: CPT | Performed by: NURSE PRACTITIONER

## 2023-04-18 PROCEDURE — 3074F SYST BP LT 130 MM HG: CPT | Performed by: NURSE PRACTITIONER

## 2023-04-18 PROCEDURE — 1159F MED LIST DOCD IN RCRD: CPT | Performed by: NURSE PRACTITIONER

## 2023-04-18 RX ORDER — NYSTATIN 100000 [USP'U]/G
POWDER TOPICAL
COMMUNITY
Start: 2023-04-06

## 2023-04-18 RX ORDER — NYSTATIN AND TRIAMCINOLONE ACETONIDE 100000; 1 [USP'U]/G; MG/G
OINTMENT TOPICAL
COMMUNITY
Start: 2023-04-06

## 2023-04-18 NOTE — PROGRESS NOTES
Chief Complaint  Follow-up (2 week follow up on fatigue. Patient states this is better today. The patient states he has some fungal infection on his rectum. He went to [facility] clinic given a cream, suppository, powder.)  Subjective    History of Present Illness  Kevin ENMA Bradshaw is a 52 y.o. male with underlying hypertension, obesity, history of pulmonary embolus,  impaired fasting glucose among other presents to [facility] for follow-up of fatigue. The patient reports he is feeling better. He is on vitamin d prescription and tolerating it well.  The patient was seen at an acute clinic and treated for a fungal infection on his rectum.  He says the medication helped but then he stopped it and the pain returned.  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  He has restarted the medicine today.      Past Medical History:   Diagnosis Date   • Allergies    • Anxiety 08/17/2021   • Degeneration of lumbar intervertebral disc 02/05/2016   • Fatigue 02/02/2023   • GERD (gastroesophageal reflux disease)    • Gout    • High blood pressure    • Hypertension    • Impaired fasting glucose 03/15/2022   • Male hypogonadism 07/28/2021   • Mid back pain 02/24/2016   • Obstructive sleep apnea 07/28/2021   • Polycythemia, secondary 03/15/2022    Related to ETOH abuse    • Pulmonary embolism    • Tendinitis of elbow 07/16/2021   • Thoracic back pain    • Vitamin D deficiency 07/28/2021        Past Surgical History:   Procedure Laterality Date   • CHOLECYSTECTOMY     • GALLBLADDER SURGERY     • HEMORRHOIDECTOMY     • LUMBAR EPIDURAL INJECTION  2015   • TONSILLECTOMY          No Known Allergies       Current Outpatient Medications:   •  allopurinol (ZYLOPRIM) 100 MG tablet, Take 1 tablet by mouth Daily., Disp: 30 tablet, Rfl: 5  •  apixaban (Eliquis) 5 MG tablet tablet, Take 1 tablet by mouth Every 12 (Twelve) Hours., Disp: 60 tablet, Rfl: 5  •  atenolol (TENORMIN) 25 MG tablet,  "Take 1 tablet by mouth 2 (Two) Times a Day., Disp: 60 tablet, Rfl: 5  •  cetirizine (zyrTEC) 10 MG tablet, Take 1 tablet by mouth Daily., Disp: , Rfl:   •  Cholecalciferol (Vitamin D3) 50 MCG (2000 UT) capsule, Take 1 capsule by mouth Daily., Disp: 90 capsule, Rfl: 1  •  cyclobenzaprine (FLEXERIL) 10 MG tablet, Take 1 tablet by mouth 2 (Two) Times a Day As Needed for Muscle Spasms., Disp: 30 tablet, Rfl: 5  •  fluticasone (Flonase) 50 MCG/ACT nasal spray, 2 sprays into the nostril(s) as directed by provider Daily., Disp: 9.9 g, Rfl: 3  •  folic acid (FOLVITE) 1 MG tablet, TAKE ONE TABLET BY MOUTH TWICE A DAY, Disp: 60 tablet, Rfl: 3  •  nystatin 130575 UNIT/GM topical powder, , Disp: , Rfl:   •  nystatin-triamcinolone (MYCOLOG) 490191-6.1 UNIT/GM-% ointment, , Disp: , Rfl:   •  ondansetron ODT (Zofran ODT) 4 MG disintegrating tablet, Place 1 tablet on the tongue Every 8 (Eight) Hours As Needed for Nausea or Vomiting., Disp: 12 tablet, Rfl: 1  •  pantoprazole (PROTONIX) 40 MG EC tablet, Take 1 tablet by mouth Daily., Disp: 30 tablet, Rfl: 5  •  vitamin D (ERGOCALCIFEROL) 1.25 MG (69245 UT) capsule capsule, Take 1 capsule by mouth 1 (One) Time Per Week. For 3 months then start vitamin D3 2000 units daily, Disp: 12 capsule, Rfl: 0  •  hydroCHLOROthiazide (HYDRODIURIL) 50 MG tablet, Take 1 tablet by mouth Daily for 30 days., Disp: 30 tablet, Rfl: 5  •  lisinopril (PRINIVIL,ZESTRIL) 40 MG tablet, Take 1 tablet by mouth Daily for 30 days., Disp: 30 tablet, Rfl: 5    Objective   /80 (BP Location: Left arm, Patient Position: Sitting, Cuff Size: Large Adult)   Pulse 73   Temp 97.5 °F (36.4 °C) (Temporal)   Ht 174 cm (68.5\")   Wt (!) 147 kg (324 lb 12.8 oz)   SpO2 97%   BMI 48.67 kg/m²    Estimated body mass index is 48.67 kg/m² as calculated from the following:    Height as of this encounter: 174 cm (68.5\").    Weight as of this encounter: 147 kg (324 lb 12.8 oz).   Physical Exam  Vitals reviewed. "   Constitutional:       General: He is not in acute distress.     Appearance: He is obese.   HENT:      Head: Normocephalic and atraumatic.   Pulmonary:      Effort: Pulmonary effort is normal.   Neurological:      General: No focal deficit present.      Mental Status: He is alert.   Psychiatric:         Thought Content: Thought content normal.        Result Review :  The following data was reviewed by: AIDAN Finnegan on 04/18/2023:  CMP        4/4/2023    12:00   CMP   Glucose 107     BUN 7     Creatinine 0.78     EGFR 108.0     Sodium 139     Potassium 4.5     Chloride 101     Calcium 9.9     Total Protein 7.5     Albumin 4.0     Globulin 3.5     Total Bilirubin 0.9     Alkaline Phosphatase 66     AST (SGOT) 28     ALT (SGPT) 25     Albumin/Globulin Ratio 1.1     BUN/Creatinine Ratio 9.0     Anion Gap 11.5       CBC w/diff        4/4/2023    12:00   CBC w/Diff   WBC 10.85     RBC 5.81     Hemoglobin 17.6     Hematocrit 52.0     MCV 89.5     MCH 30.3     MCHC 33.8     RDW 12.6     Platelets 353     Neutrophil Rel % 71.2     Immature Granulocyte Rel % 0.6     Lymphocyte Rel % 15.7     Monocyte Rel % 11.1     Eosinophil Rel % 0.8     Basophil Rel % 0.6       Lipid Panel        4/4/2023    12:00   Lipid Panel   Total Cholesterol 169     Triglycerides 95     HDL Cholesterol 49     VLDL Cholesterol 17     LDL Cholesterol  103     LDL/HDL Ratio 2.06       TSH        4/4/2023    12:00   TSH   TSH 1.640       A1C Last 3 Results        4/4/2023    12:00   HGBA1C Last 3 Results   Hemoglobin A1C 5.90                   Assessment and Plan   Diagnoses and all orders for this visit:    1. Fatigue, unspecified type (Primary)  Comments:  Improved.    2. Vitamin D deficiency  Comments:  Continue vitamin D prescription and take with a fat containing meal.    3. Rectal pain  Comments:  Work excuse for today.  Continue medications as prescribed.         Patient was given instructions and counseling regarding his condition or  for health maintenance advice. Please see specific information pulled into the AVS if appropriate.     Follow Up   Return for Next scheduled follow up.    AIDAN Finnegan

## 2023-04-20 ENCOUNTER — APPOINTMENT (OUTPATIENT)
Dept: GENERAL RADIOLOGY | Facility: HOSPITAL | Age: 53
End: 2023-04-20
Payer: COMMERCIAL

## 2023-04-20 ENCOUNTER — HOSPITAL ENCOUNTER (EMERGENCY)
Facility: HOSPITAL | Age: 53
Discharge: HOME | End: 2023-04-20
Attending: EMERGENCY MEDICINE | Admitting: EMERGENCY MEDICINE
Payer: COMMERCIAL

## 2023-04-20 VITALS
DIASTOLIC BLOOD PRESSURE: 58 MMHG | BODY MASS INDEX: 47.74 KG/M2 | TEMPERATURE: 98 F | OXYGEN SATURATION: 100 % | HEIGHT: 68 IN | SYSTOLIC BLOOD PRESSURE: 137 MMHG | WEIGHT: 315 LBS | HEART RATE: 64 BPM | RESPIRATION RATE: 18 BRPM

## 2023-04-20 DIAGNOSIS — M54.50 ACUTE RIGHT-SIDED LOW BACK PAIN WITHOUT SCIATICA: ICD-10-CM

## 2023-04-20 DIAGNOSIS — M54.6 ACUTE RIGHT-SIDED THORACIC BACK PAIN: Primary | ICD-10-CM

## 2023-04-20 DIAGNOSIS — M54.50 ACUTE MIDLINE LOW BACK PAIN WITHOUT SCIATICA: ICD-10-CM

## 2023-04-20 PROCEDURE — 96372 THER/PROPH/DIAG INJ SC/IM: CPT

## 2023-04-20 PROCEDURE — 99282 EMERGENCY DEPT VISIT SF MDM: CPT

## 2023-04-20 PROCEDURE — 72100 X-RAY EXAM L-S SPINE 2/3 VWS: CPT

## 2023-04-20 PROCEDURE — 97161 PT EVAL LOW COMPLEX 20 MIN: CPT | Performed by: PHYSICAL THERAPIST

## 2023-04-20 PROCEDURE — 25010000002 KETOROLAC TROMETHAMINE PER 15 MG: Performed by: NURSE PRACTITIONER

## 2023-04-20 PROCEDURE — 97110 THERAPEUTIC EXERCISES: CPT | Performed by: PHYSICAL THERAPIST

## 2023-04-20 RX ORDER — KETOROLAC TROMETHAMINE 10 MG/1
10 TABLET, FILM COATED ORAL EVERY 6 HOURS PRN
Qty: 20 TABLET | Refills: 0 | Status: SHIPPED | OUTPATIENT
Start: 2023-04-20

## 2023-04-20 RX ORDER — BACLOFEN 10 MG/1
10 TABLET ORAL EVERY EVENING
Qty: 30 TABLET | Refills: 0 | Status: SHIPPED | OUTPATIENT
Start: 2023-04-20

## 2023-04-20 RX ORDER — KETOROLAC TROMETHAMINE 30 MG/ML
30 INJECTION, SOLUTION INTRAMUSCULAR; INTRAVENOUS ONCE
Status: COMPLETED | OUTPATIENT
Start: 2023-04-20 | End: 2023-04-20

## 2023-04-20 RX ADMIN — KETOROLAC TROMETHAMINE 30 MG: 30 INJECTION, SOLUTION INTRAMUSCULAR; INTRAVENOUS at 11:23

## 2023-04-20 NOTE — ED PROVIDER NOTES
Time: 11:00 AM EDT  Date of encounter:  4/20/2023  Independent Historian/Clinical History and Information was obtained by:   Patient  Chief Complaint: back pain    History is limited by: N/A    History of Present Illness:  Patient is a 52 y.o. year old male who presents to the emergency department for evaluation of back pain. He says he do not know what happened.  He reports the pain in the low thoracic/upper lumbar area.  He has not taken any medications today.  He does have a history of scoliosis and bulging T10 disc. He denies fecal or urinary incontinence and has no saddle anesthesia.    HPI    Patient Care Team  Primary Care Provider: Bang Cárdenas MD    Past Medical History:     No Known Allergies  Past Medical History:   Diagnosis Date   • Allergies    • Anxiety 08/17/2021   • Degeneration of lumbar intervertebral disc 02/05/2016   • Fatigue 02/02/2023   • GERD (gastroesophageal reflux disease)    • Gout    • High blood pressure    • Hypertension    • Impaired fasting glucose 03/15/2022   • Male hypogonadism 07/28/2021   • Mid back pain 02/24/2016   • Obstructive sleep apnea 07/28/2021   • Polycythemia, secondary 03/15/2022    Related to ETOH abuse    • Pulmonary embolism    • Tendinitis of elbow 07/16/2021   • Thoracic back pain    • Vitamin D deficiency 07/28/2021     Past Surgical History:   Procedure Laterality Date   • CHOLECYSTECTOMY     • GALLBLADDER SURGERY     • HEMORRHOIDECTOMY     • LUMBAR EPIDURAL INJECTION  2015   • TONSILLECTOMY       Family History   Problem Relation Age of Onset   • Hypertension Mother    • Hypertension Father        Home Medications:  Prior to Admission medications    Medication Sig Start Date End Date Taking? Authorizing Provider   allopurinol (ZYLOPRIM) 100 MG tablet Take 1 tablet by mouth Daily. 12/19/22   Jennifer Hobbs APRN   apixaban (Eliquis) 5 MG tablet tablet Take 1 tablet by mouth Every 12 (Twelve) Hours. 12/19/22   Jennifer Hobbs APRN   atenolol (TENORMIN)  25 MG tablet Take 1 tablet by mouth 2 (Two) Times a Day. 12/19/22   Jennifer Hobbs APRN   cetirizine (zyrTEC) 10 MG tablet Take 1 tablet by mouth Daily.    Emergency, Nurse Parviz, RN   Cholecalciferol (Vitamin D3) 50 MCG (2000 UT) capsule Take 1 capsule by mouth Daily. 4/5/23 4/4/24  Gina Damian MD   cyclobenzaprine (FLEXERIL) 10 MG tablet Take 1 tablet by mouth 2 (Two) Times a Day As Needed for Muscle Spasms. 10/5/22   Jennifer Hobbs APRN   fluticasone (Flonase) 50 MCG/ACT nasal spray 2 sprays into the nostril(s) as directed by provider Daily. 3/29/22   Jennifer Hobbs APRN   folic acid (FOLVITE) 1 MG tablet TAKE ONE TABLET BY MOUTH TWICE A DAY 4/13/23   Dony Paez MD   hydroCHLOROthiazide (HYDRODIURIL) 50 MG tablet Take 1 tablet by mouth Daily for 30 days. 12/19/22 1/18/23  Jennifer Hobbs APRN   lisinopril (PRINIVIL,ZESTRIL) 40 MG tablet Take 1 tablet by mouth Daily for 30 days. 12/19/22 1/18/23  Jennifer Hobbs APRN   nystatin 496035 UNIT/GM topical powder  4/6/23   ProviderAbbie MD   nystatin-triamcinolone (MYCOLOG) 780367-3.1 UNIT/GM-% ointment  4/6/23   ProviderAbbie MD   ondansetron ODT (Zofran ODT) 4 MG disintegrating tablet Place 1 tablet on the tongue Every 8 (Eight) Hours As Needed for Nausea or Vomiting. 5/23/22   Jennifer Hobbs APRN   pantoprazole (PROTONIX) 40 MG EC tablet Take 1 tablet by mouth Daily. 11/23/22   Jennifer Hobbs APRN   vitamin D (ERGOCALCIFEROL) 1.25 MG (46444 UT) capsule capsule Take 1 capsule by mouth 1 (One) Time Per Week. For 3 months then start vitamin D3 2000 units daily 4/5/23   KarishmaGina Paige MD        Social History:   Social History     Tobacco Use   • Smoking status: Never   • Smokeless tobacco: Current     Types: Chew   Vaping Use   • Vaping Use: Never used   Substance Use Topics   • Alcohol use: Yes     Alcohol/week: 28.0 standard drinks     Types: 28 Standard drinks or equivalent per week  "  • Drug use: Never         Review of Systems:  Review of Systems   Musculoskeletal: Positive for back pain.   Neurological: Negative for numbness.        Physical Exam:  /58 (BP Location: Right arm, Patient Position: Sitting)   Pulse 64   Temp 98 °F (36.7 °C) (Oral)   Resp 18   Ht 172.7 cm (68\")   Wt (!) 151 kg (333 lb 12.4 oz)   SpO2 100%   BMI 50.75 kg/m²     Physical Exam  Constitutional:       General: He is not in acute distress.     Appearance: He is obese. He is not ill-appearing.   Cardiovascular:      Rate and Rhythm: Normal rate.   Pulmonary:      Effort: Pulmonary effort is normal. No respiratory distress.   Musculoskeletal:         General: Tenderness present.   Neurological:      General: No focal deficit present.      Mental Status: He is alert and oriented to person, place, and time.   Psychiatric:         Mood and Affect: Mood normal.                  Procedures:  Procedures      Medical Decision Making:      Comorbidities that affect care:    Previous back injury, scoliosis, T10 disc bulging, Obesity    External Notes reviewed:    None      The following orders were placed and all results were independently analyzed by me:  Orders Placed This Encounter   Procedures   • XR Spine Lumbar 2 or 3 View   • PT Plan of Care Cert / Re-Cert       Medications Given in the Emergency Department:  Medications   ketorolac (TORADOL) injection 30 mg (30 mg Intramuscular Given 4/20/23 1123)        ED Course:         Labs:    Lab Results (last 24 hours)     ** No results found for the last 24 hours. **           Imaging:    No Radiology Exams Resulted Within Past 24 Hours      Differential Diagnosis and Discussion:    Back Pain: The patient presents with back pain. My differential diagnosis includes but is not limited to acute spinal epidural abscess, acute spinal epidural bleed, cauda equina syndrome, abdominal aortic aneurysm, aortic dissection, kidney stone, pyelonephritis, musculoskeletal back pain, " spinal fracture, and osteoarthritis.     All X-rays impressions were independently interpreted by me.    MDM         Patient Care Considerations:    NARCOTICS: I considered prescribing opiate pain medication as an outpatient, however They were not indicated.      Consultants/Shared Management Plan:    None    Social Determinants of Health:    Patient is independent, reliable, and has access to care.       Disposition and Care Coordination:    Discharged: The patient is suitable and stable for discharge with no need for consideration of observation or admission.    I have explained the patient´s condition, diagnoses and treatment plan based on the information available to me at this time. I have answered questions and addressed any concerns. The patient has a good  understanding of the patient´s diagnosis, condition, and treatment plan as can be expected at this point. The vital signs have been stable. The patient´s condition is stable and appropriate for discharge from the emergency department.      The patient will pursue further outpatient evaluation with the primary care physician or other designated or consulting physician as outlined in the discharge instructions. They are agreeable to this plan of care and follow-up instructions have been explained in detail. The patient has received these instructions in written format and have expressed an understanding of the discharge instructions. The patient is aware that any significant change in condition or worsening of symptoms should prompt an immediate return to this or the closest emergency department or call to 911.    Final diagnoses:   Acute midline low back pain without sciatica        ED Disposition     ED Disposition   Discharge    Condition   Stable    Comment   --             This medical record created using voice recognition software.           Mirella Trammell, ADIAN  04/25/23 0949

## 2023-04-20 NOTE — THERAPY EVALUATION
Patient Name: Kevin Bradshaw  : 1970    MRN: [id number]                              Today's Date: 2023       Admit Date: 2023    Visit Dx:     ICD-10-CM ICD-9-CM   1. Acute right-sided thoracic back pain  M54.6 724.1   2. Acute right-sided low back pain without sciatica  M54.50 724.2     Patient Active Problem List   Diagnosis   • Tendinitis of elbow   • Chronic gouty arthritis   • Degeneration of lumbar intervertebral disc   • Essential hypertension   • Gastro-esophageal reflux disease without esophagitis   • Male hypogonadism   • Obstructive sleep apnea   • Low back pain   • Thoracic back pain   • Vitamin D deficiency   • History of pulmonary embolus (PE)   • Anxiety   • Impaired fasting glucose   • Polycythemia, secondary   • Nausea and vomiting   • Other fatigue   • Morbid (severe) obesity due to excess calories     Past Medical History:   Diagnosis Date   • Allergies    • Anxiety 2021   • Degeneration of lumbar intervertebral disc 2016   • Fatigue 2023   • GERD (gastroesophageal reflux disease)    • Gout    • High blood pressure    • Hypertension    • Impaired fasting glucose 03/15/2022   • Male hypogonadism 2021   • Mid back pain 2016   • Obstructive sleep apnea 2021   • Polycythemia, secondary 03/15/2022    Related to ETOH abuse    • Pulmonary embolism    • Tendinitis of elbow 2021   • Thoracic back pain    • Vitamin D deficiency 2021     Past Surgical History:   Procedure Laterality Date   • CHOLECYSTECTOMY     • GALLBLADDER SURGERY     • HEMORRHOIDECTOMY     • LUMBAR EPIDURAL INJECTION     • TONSILLECTOMY        General Information     Row Name 23 1043          Physical Therapy Time and Intention    Document Type evaluation  -LR     Mode of Treatment individual therapy  -LR     Row Name 23 1043          General Information    Patient Profile Reviewed yes  -LR     Prior Level of Function independent:  -LR           User Key   (r) = Recorded By, (t) = Taken By, (c) = Cosigned By    Initials Name Provider Type    Yamile Friedman, PT Physical Therapist              History: Pt reports this morning he started experiencing pain on the right side of his back.  He describes it as a knot and states the pain runs up to his shoulder blade on the right side.  Pain is a 6/10.  Pt reports a history of low back pain.  Pt reports pain increases if he puts pressure on the right side of his body.  Pain decreases with lying flat.  He denies any numbness/tingling.  Pt is a  for [employer].    Objective:    Palpation: Tender to palpation at R lumbar/thoracic paraspinals at L1/T12 level; pain radiates up from this point but this is where the most tenderness is    ROM:  Lumbar ROM:  Flexion: WNL  Extension: WNL  L Side bending: WNL  R Side bending: WNL  L Rotation: 75%  R Rotation: 50%    B thoracic rotation: 75% to the L, 50% to the R  *pain with B rotation and R side bending     Strength:  L Hip MMT:   R Hip MMT:  Flexion: 5/5  Flexion: 5/5  Abduction: 5/5  Abduction: 5/5  Adduction: 5/5  Adduction: 5/5    L Knee MMT:  R Knee MMT:  Flexion: 5/5  Flexion: 5/5  Extension: 5/5  Extension: 5/5    L Ankle MMT:  R Ankle MMT:  DF: 5/5  DF: 5/5  PF: 5/5   PF: 5/5    B UE strength 5/5  *pain with shoulder flexion MMT    Special Tests:  Quadrant Test: positive  Slump Test: negative B  Straight Leg Raise Test: positive on R, negative on L  Contralateral Straight Leg Raise Test: NT  Obers Test: NT     Sensation: B LE and UE sensation intact    Assessment/Plan:   Pt presents with a diagnosis of back pain and has tenderness at lower thoracic and upper lumbar region on the R and decreased thoracic/lumbar ROM that are limiting his ability to sit and stand.  The patient performed exercises to improve thoracic and lumbar rotational ROM.  He was provided with a HEP handout.     Goals:   LTG 1: The patient will be independent in HEP in order to decrease pain and  improve tolerance to functional activities.  STATUS: Met    Interventions:   Manual Therapy: not performed    Therapeutic Exercises: sidelying trunk rotation stretch (3x20 seconds R), standing open book (5x5 seconds R), quadratus stretch (3x20 seconds R)    Modalities: not performed   Outcome Measures     Row Name 04/20/23 1043          Optimal Instrument    Optimal Instrument Optimal - 3  -LR     Moving - lying to sitting 2  -LR     Standing 2  -LR     Lifting 2  -LR     From the list, choose the 3 activities you would most like to be able to do without any difficulty Standing;Moving -lying to sitting;Lifting  -LR     Total Score Optimal - 3 4  -LR     Row Name 04/20/23 1043          Functional Assessment    Outcome Measure Options Optimal Instrument  -LR           User Key  (r) = Recorded By, (t) = Taken By, (c) = Cosigned By    Initials Name Provider Type    LR Yamile Vargas, PT Physical Therapist               04/20/23 1044   PT Evaluation Complexity   History, PT Evaluation Complexity 1-2 personal factors and/or comorbidities   Examination of Body Systems (PT Eval Complexity) 1-2 elements   Clinical Presentation (PT Evaluation Complexity) stable   Clinical Decision Making (PT Evaluation Complexity) low complexity   Overall Complexity (PT Evaluation Complexity) low complexity      Time Calculation:    PT Charges     Row Name 04/20/23 1044             Time Calculation    PT Received On 04/20/23  -LR         Time Calculation- PT    Total Timed Code Minutes- PT 20 minute(s)  -LR         Timed Charges    44941 - PT Therapeutic Exercise Minutes 8  -LR         Untimed Charges    PT Eval/Re-eval Minutes 12  -LR         Total Minutes    Timed Charges Total Minutes 8  -LR      Untimed Charges Total Minutes 12  -LR       Total Minutes 20  -LR            User Key  (r) = Recorded By, (t) = Taken By, (c) = Cosigned By    Initials Name Provider Type    Yamile Friedman, PT Physical Therapist              Therapy Charges for  Today     Code Description Service Date Service Provider Modifiers Qty    10578610137 HC PT THER PROC EA 15 MIN 4/20/2023 Yamile Vargas, PT GP 1    44727329449 HC PT EVAL LOW COMPLEXITY 1 4/20/2023 Yamile Vargas, PT GP 1          PT G-Codes  Outcome Measure Options: Optimal Instrument       Yamile Vargas, PT  4/20/2023

## 2023-04-20 NOTE — Clinical Note
[facility] EMERGENCY ROOM  [address]  [place] [address]  Phone: [phone]    Kevin Bradshaw was seen and treated in our emergency department on 4/20/2023.  He may return to work on 04/24/2023.         Thank you for choosing [facility].    Mirella Trammell APRN

## 2023-04-26 ENCOUNTER — TELEPHONE (OUTPATIENT)
Dept: INTERNAL MEDICINE | Facility: CLINIC | Age: 53
End: 2023-04-26
Payer: MEDICAID

## 2023-04-26 DIAGNOSIS — M54.6 THORACIC BACK PAIN, UNSPECIFIED BACK PAIN LATERALITY, UNSPECIFIED CHRONICITY: Primary | ICD-10-CM

## 2023-04-26 NOTE — TELEPHONE ENCOUNTER
Patient went to the ER last night. Thoracic area pain, more on the right side. Radiates down to the kidney. Taking some muscle relaxers, not sure which one, that makes him drowsy so it is hard for him to take when he is working. Went  To [facility] before. Can I place an order for him?

## 2023-05-04 ENCOUNTER — TELEPHONE (OUTPATIENT)
Dept: INTERNAL MEDICINE | Facility: CLINIC | Age: 53
End: 2023-05-04
Payer: MEDICAID

## 2023-05-04 DIAGNOSIS — G89.29 CHRONIC LOW BACK PAIN, UNSPECIFIED BACK PAIN LATERALITY, UNSPECIFIED WHETHER SCIATICA PRESENT: Primary | ICD-10-CM

## 2023-05-04 DIAGNOSIS — M54.50 CHRONIC LOW BACK PAIN, UNSPECIFIED BACK PAIN LATERALITY, UNSPECIFIED WHETHER SCIATICA PRESENT: Primary | ICD-10-CM

## 2023-05-04 NOTE — TELEPHONE ENCOUNTER
Caller: Kevin Bradshaw    Relationship: Self    Best call back number: [phone]    What is the best time to reach you: ANY     Who are you requesting to speak with (clinical staff, provider,  specific staff member): CLINICAL     What was the call regarding: PATIENT CALLED TO CHECK ON THE STATUS OF HIS PHYSICAL THERAPY REFERRAL. PATIENT REQUESTED TO BE CALLED BACK BEFORE 1PM. PLEASE ADVISE.     Do you require a callback: YES

## 2023-05-04 NOTE — TELEPHONE ENCOUNTER
[facility] doesn't accept his insurance. Placed therapy order with PTA. They can see him next Thursday, May 11th at 1:45. Patient advised.

## 2023-05-09 RX ORDER — LISINOPRIL 40 MG/1
TABLET ORAL
Qty: 30 TABLET | Refills: 5 | Status: SHIPPED | OUTPATIENT
Start: 2023-05-09

## 2023-05-09 RX ORDER — HYDROCHLOROTHIAZIDE 50 MG/1
TABLET ORAL
Qty: 30 TABLET | Refills: 5 | Status: SHIPPED | OUTPATIENT
Start: 2023-05-09

## 2023-05-20 DIAGNOSIS — K21.9 GASTRO-ESOPHAGEAL REFLUX DISEASE WITHOUT ESOPHAGITIS: ICD-10-CM

## 2023-05-22 RX ORDER — PANTOPRAZOLE SODIUM 40 MG/1
TABLET, DELAYED RELEASE ORAL
Qty: 30 TABLET | Refills: 5 | Status: SHIPPED | OUTPATIENT
Start: 2023-05-22

## 2023-08-19 ENCOUNTER — APPOINTMENT (OUTPATIENT)
Dept: CT IMAGING | Facility: HOSPITAL | Age: 53
End: 2023-08-19
Payer: COMMERCIAL

## 2023-08-19 ENCOUNTER — HOSPITAL ENCOUNTER (EMERGENCY)
Facility: HOSPITAL | Age: 53
Discharge: HOME | End: 2023-08-19
Attending: EMERGENCY MEDICINE
Payer: COMMERCIAL

## 2023-08-19 VITALS
HEART RATE: 62 BPM | HEIGHT: 68 IN | SYSTOLIC BLOOD PRESSURE: 114 MMHG | TEMPERATURE: 97.8 F | RESPIRATION RATE: 18 BRPM | DIASTOLIC BLOOD PRESSURE: 63 MMHG | OXYGEN SATURATION: 96 % | WEIGHT: 315 LBS | BODY MASS INDEX: 47.74 KG/M2

## 2023-08-19 DIAGNOSIS — K11.21 ACUTE SIALOADENITIS: Primary | ICD-10-CM

## 2023-08-19 DIAGNOSIS — K11.5 PAROTID SIALOLITHIASIS: ICD-10-CM

## 2023-08-19 LAB
ALBUMIN SERPL-MCNC: 3.6 G/DL (ref 3.5–5.2)
ALBUMIN/GLOB SERPL: 1.1 G/DL
ALP SERPL-CCNC: 66 U/L (ref 39–117)
ALT SERPL W P-5'-P-CCNC: 23 U/L (ref 1–41)
ANION GAP SERPL CALCULATED.3IONS-SCNC: 11.3 MMOL/L (ref 5–15)
AST SERPL-CCNC: 24 U/L (ref 1–40)
BASOPHILS # BLD AUTO: 0.07 10*3/MM3 (ref 0–0.2)
BASOPHILS NFR BLD AUTO: 0.9 % (ref 0–1.5)
BILIRUB SERPL-MCNC: 0.8 MG/DL (ref 0–1.2)
BUN SERPL-MCNC: 11 MG/DL (ref 6–20)
BUN/CREAT SERPL: 14.9 (ref 7–25)
CALCIUM SPEC-SCNC: 8.8 MG/DL (ref 8.6–10.5)
CHLORIDE SERPL-SCNC: 95 MMOL/L (ref 98–107)
CO2 SERPL-SCNC: 24.7 MMOL/L (ref 22–29)
CREAT SERPL-MCNC: 0.74 MG/DL (ref 0.76–1.27)
DEPRECATED RDW RBC AUTO: 44.2 FL (ref 37–54)
EGFRCR SERPLBLD CKD-EPI 2021: 109.7 ML/MIN/1.73
EOSINOPHIL # BLD AUTO: 0.12 10*3/MM3 (ref 0–0.4)
EOSINOPHIL NFR BLD AUTO: 1.6 % (ref 0.3–6.2)
ERYTHROCYTE [DISTWIDTH] IN BLOOD BY AUTOMATED COUNT: 14.9 % (ref 12.3–15.4)
GLOBULIN UR ELPH-MCNC: 3.3 GM/DL
GLUCOSE SERPL-MCNC: 114 MG/DL (ref 65–99)
HCT VFR BLD AUTO: 51.6 % (ref 37.5–51)
HGB BLD-MCNC: 17.4 G/DL (ref 13–17.7)
IMM GRANULOCYTES # BLD AUTO: 0.03 10*3/MM3 (ref 0–0.05)
IMM GRANULOCYTES NFR BLD AUTO: 0.4 % (ref 0–0.5)
LYMPHOCYTES # BLD AUTO: 1.5 10*3/MM3 (ref 0.7–3.1)
LYMPHOCYTES NFR BLD AUTO: 19.6 % (ref 19.6–45.3)
MCH RBC QN AUTO: 30.2 PG (ref 26.6–33)
MCHC RBC AUTO-ENTMCNC: 33.7 G/DL (ref 31.5–35.7)
MCV RBC AUTO: 89.6 FL (ref 79–97)
MONOCYTES # BLD AUTO: 0.94 10*3/MM3 (ref 0.1–0.9)
MONOCYTES NFR BLD AUTO: 12.3 % (ref 5–12)
NEUTROPHILS NFR BLD AUTO: 4.99 10*3/MM3 (ref 1.7–7)
NEUTROPHILS NFR BLD AUTO: 65.2 % (ref 42.7–76)
NRBC BLD AUTO-RTO: 0 /100 WBC (ref 0–0.2)
PLATELET # BLD AUTO: 235 10*3/MM3 (ref 140–450)
PMV BLD AUTO: 9.4 FL (ref 6–12)
POTASSIUM SERPL-SCNC: 4.1 MMOL/L (ref 3.5–5.2)
PROT SERPL-MCNC: 6.9 G/DL (ref 6–8.5)
RBC # BLD AUTO: 5.76 10*6/MM3 (ref 4.14–5.8)
SODIUM SERPL-SCNC: 131 MMOL/L (ref 136–145)
WBC NRBC COR # BLD: 7.65 10*3/MM3 (ref 3.4–10.8)

## 2023-08-19 PROCEDURE — 70491 CT SOFT TISSUE NECK W/DYE: CPT

## 2023-08-19 PROCEDURE — 85025 COMPLETE CBC W/AUTO DIFF WBC: CPT | Performed by: EMERGENCY MEDICINE

## 2023-08-19 PROCEDURE — 99285 EMERGENCY DEPT VISIT HI MDM: CPT

## 2023-08-19 PROCEDURE — 25510000001 IOPAMIDOL PER 1 ML: Performed by: EMERGENCY MEDICINE

## 2023-08-19 PROCEDURE — 80053 COMPREHEN METABOLIC PANEL: CPT | Performed by: EMERGENCY MEDICINE

## 2023-08-19 RX ORDER — CEPHALEXIN 250 MG/1
500 CAPSULE ORAL EVERY 6 HOURS SCHEDULED
Status: CANCELLED | OUTPATIENT
Start: 2023-08-19 | End: 2023-08-26

## 2023-08-19 RX ORDER — SODIUM CHLORIDE 0.9 % (FLUSH) 0.9 %
10 SYRINGE (ML) INJECTION AS NEEDED
Status: DISCONTINUED | OUTPATIENT
Start: 2023-08-19 | End: 2023-08-19 | Stop reason: HOSPADM

## 2023-08-19 RX ORDER — CLINDAMYCIN HYDROCHLORIDE 300 MG/1
300 CAPSULE ORAL 4 TIMES DAILY
Qty: 40 CAPSULE | Refills: 0 | Status: SHIPPED | OUTPATIENT
Start: 2023-08-19 | End: 2023-08-28

## 2023-08-19 RX ADMIN — IOPAMIDOL 100 ML: 755 INJECTION, SOLUTION INTRAVENOUS at 13:19

## 2023-08-19 NOTE — ED PROVIDER NOTES
Time: 10:55 AM EDT  Date of encounter:  8/19/2023  Independent Historian/Clinical History and Information was obtained by:   Patient  Chief Complaint: Neck swelling    History is limited by: N/A    History of Present Illness:  Patient is a 52 y.o. year old male who presents to the emergency department for evaluation of neck swelling. Pt states that he began noticing left sided neck swelling yesterday which has been constant since onset and seems to be worsening. The swelling is non-painful at rest but becomes mildly painful with neck motion. He denies any relieving factors. He also reports some left sided ear pain which started at the same time as his neck swelling. He denies any shortness of air, sore throat, rhinorrhea, cough, or fever.    HPI    Patient Care Team  Primary Care Provider: Jennifer Hobbs APRN    Past Medical History:     No Known Allergies  Past Medical History:   Diagnosis Date    Allergies     Anxiety 08/17/2021    Degeneration of lumbar intervertebral disc 02/05/2016    Fatigue 02/02/2023    GERD (gastroesophageal reflux disease)     Gout     High blood pressure     Hypertension     Impaired fasting glucose 03/15/2022    Male hypogonadism 07/28/2021    Mid back pain 02/24/2016    Obstructive sleep apnea 07/28/2021    Polycythemia, secondary 03/15/2022    Related to ETOH abuse     Pulmonary embolism     Tendinitis of elbow 07/16/2021    Thoracic back pain     Vitamin D deficiency 07/28/2021     Past Surgical History:   Procedure Laterality Date    CHOLECYSTECTOMY      GALLBLADDER SURGERY      HEMORRHOIDECTOMY      LUMBAR EPIDURAL INJECTION  2015    TONSILLECTOMY       Family History   Problem Relation Age of Onset    Hypertension Mother     Hypertension Father        Home Medications:  Prior to Admission medications    Medication Sig Start Date End Date Taking? Authorizing Provider   allopurinol (ZYLOPRIM) 100 MG tablet Take 1 tablet by mouth Daily. 6/20/23   Jennifer Hobbs APRN   apixaban  (Eliquis) 5 MG tablet tablet Take 1 tablet by mouth Every 12 (Twelve) Hours. 6/20/23   Jennifer Hobbs APRN   atenolol (TENORMIN) 25 MG tablet Take 1 tablet by mouth 2 (Two) Times a Day. 6/20/23   Jennifer Hobbs APRN   Cholecalciferol (Vitamin D3) 50 MCG (2000 UT) capsule Take 1 capsule by mouth Daily. 4/5/23 4/4/24  Gina Damian MD   cyclobenzaprine (FLEXERIL) 10 MG tablet Take 1 tablet by mouth 2 (Two) Times a Day As Needed for Muscle Spasms. 6/20/23   Jennifer Hobbs APRN   fluticasone (Flonase) 50 MCG/ACT nasal spray 2 sprays into the nostril(s) as directed by provider Daily. 3/29/22   Jennifer Hobbs APRN   folic acid (FOLVITE) 1 MG tablet Take 1 tablet by mouth 2 (Two) Times a Day. 6/20/23   Jennifer Hobbs APRN   hydroCHLOROthiazide (HYDRODIURIL) 50 MG tablet Take 1 tablet by mouth Daily. 6/20/23   Jennifer Hobbs APRN   lisinopril (PRINIVIL,ZESTRIL) 40 MG tablet Take 1 tablet by mouth Daily. 6/20/23   Jennifer Hobbs APRN   pantoprazole (PROTONIX) 40 MG EC tablet Take 1 tablet by mouth Daily. 6/20/23   Jennifer Hobbs APRN   vitamin D (ERGOCALCIFEROL) 1.25 MG (74073 UT) capsule capsule Take 1 capsule by mouth 1 (One) Time Per Week. Take with a meal. 6/20/23   Jennifer Hobbs APRN        Social History:   Social History     Tobacco Use    Smoking status: Never    Smokeless tobacco: Current     Types: Chew   Vaping Use    Vaping Use: Never used   Substance Use Topics    Alcohol use: Yes     Alcohol/week: 28.0 standard drinks     Types: 28 Standard drinks or equivalent per week    Drug use: Never         Review of Systems:  Review of Systems   Constitutional:  Negative for chills and fever.   HENT:  Positive for ear pain. Negative for congestion, rhinorrhea, sore throat and trouble swallowing.    Eyes:  Negative for pain.   Respiratory:  Negative for cough and shortness of breath.    Cardiovascular:  Negative for chest pain.   Gastrointestinal:  Negative for abdominal  "pain, diarrhea and vomiting.   Genitourinary:  Negative for dysuria.   Musculoskeletal:  Negative for back pain.   Skin:  Negative for pallor.   Neurological:  Negative for headaches.   All other systems reviewed and are negative.     Physical Exam:  /63   Pulse 62   Temp 97.8 øF (36.6 øC) (Oral)   Resp 18   Ht 172.7 cm (68\")   Wt (!) 145 kg (318 lb 12.6 oz)   SpO2 96%   BMI 48.47 kg/mý     Vital signs were reviewed under triage note.  General appearance - Patient appears well-developed and well-nourished.  Patient is in no acute distress.  Head - Normocephalic, atraumatic.  Pupils - Equal, round, reactive to light.  Extraocular muscles are intact.  Conjunctiva is clear.  Nasal - Normal inspection.  No evidence of trauma or epistaxis.  Tympanic membranes - Gray, intact without erythema or retractions.  Oral mucosa - Pink and moist without lesions or erythema.  Uvula is midline.  Chest wall - Atraumatic.  Chest wall is nontender.  There are no vesicular rashes noted.  Neck - Supple.  Trachea was midline.  There is fullness area just inferior to the angle of the jaw with palpable masslike structure.  This area is tender to palpation.  This feels like this is probably an enlarged parotid gland however adenopathy cannot be distinguished on exam.  There is no surrounding erythema or signs of cellulitis.    Lungs - Clear to auscultation and percussion bilaterally.  Heart - Regular rate and rhythm without any murmurs, clicks, or gallops.  Abdomen - Soft.  Bowel sounds are present.  There is no palpable tenderness.  There is no rebound, guarding, or rigidity.  There are no palpable masses.  There are no pulsatile masses.  Back - Spine is straight and midline.  There is no CVA tenderness.  Extremities - Intact x4 with full range of motion.  There is no palpable edema.  Pulses are intact x4 and equal.  Neurologic - Patient is awake, alert, and oriented x3.  Cranial nerves II through XII are grossly intact.  Motor " and sensory functions grossly intact.  Cerebellar function was normal.  Integument - There are no rashes.  There are no petechia or purpura lesions noted.  There are no vesicular lesions noted.             Procedures:  Procedures      Medical Decision Making:      Comorbidities that affect care:    Gout, hypertension, GERD, PE, vitamin D deficiency, sleep apnea, polycythemia    External Notes reviewed:    Previous Clinic Note: I reviewed Jennifer Hobbs's office visit note with this patient from 6/20/23.      The following orders were placed and all results were independently analyzed by me:  Orders Placed This Encounter   Procedures    CT Soft Tissue Neck With Contrast    Comprehensive Metabolic Panel    CBC Auto Differential    CBC & Differential       Medications Given in the Emergency Department:  Medications   iopamidol (ISOVUE-370) 76 % injection 100 mL (100 mL Intravenous Given 8/19/23 1319)        ED Course:    Kevin Bradshaw is a 52 year old male who presented to the ED for evaluation of neck swelling. I personally evaluated this patient during their visit to the ED. History was obtained. Their physical exam was remarkable for left sided neck swelling. I ordered appropriate labs and imaging related to this patient's chief complaint. Their work up in the ED was remarkable for acute left parotitis with a suspected 3 mm stone in the left parotid duct. I discussed the work up results with the patient. I instructed them to follow up with their PCP and return to the ED with any new or worsening symptoms. The patient was agreeable to the plan of care. I determined this patient to be stable and appropriate for discharge. I addressed all of this patient's questions and the patient has no further questions.      Labs:    Lab Results (last 24 hours)       Procedure Component Value Units Date/Time    CBC & Differential [[id number]]  (Abnormal) Collected: 08/19/23 1130    Specimen: Blood Updated: 08/19/23 1137    Narrative:       The following orders were created for panel order CBC & Differential.  Procedure                               Abnormality         Status                     ---------                               -----------         ------                     CBC Auto Differential[[id number]]        Abnormal            Final result                 Please view results for these tests on the individual orders.    CBC Auto Differential [[id number]]  (Abnormal) Collected: 08/19/23 1130    Specimen: Blood Updated: 08/19/23 1137     WBC 7.65 10*3/mm3      RBC 5.76 10*6/mm3      Hemoglobin 17.4 g/dL      Hematocrit 51.6 %      MCV 89.6 fL      MCH 30.2 pg      MCHC 33.7 g/dL      RDW 14.9 %      RDW-SD 44.2 fl      MPV 9.4 fL      Platelets 235 10*3/mm3      Neutrophil % 65.2 %      Lymphocyte % 19.6 %      Monocyte % 12.3 %      Eosinophil % 1.6 %      Basophil % 0.9 %      Immature Grans % 0.4 %      Neutrophils, Absolute 4.99 10*3/mm3      Lymphocytes, Absolute 1.50 10*3/mm3      Monocytes, Absolute 0.94 10*3/mm3      Eosinophils, Absolute 0.12 10*3/mm3      Basophils, Absolute 0.07 10*3/mm3      Immature Grans, Absolute 0.03 10*3/mm3      nRBC 0.0 /100 WBC     Comprehensive Metabolic Panel [[id number]]  (Abnormal) Collected: 08/19/23 1224    Specimen: Blood Updated: 08/19/23 1259     Glucose 114 mg/dL      BUN 11 mg/dL      Creatinine 0.74 mg/dL      Sodium 131 mmol/L      Potassium 4.1 mmol/L      Chloride 95 mmol/L      CO2 24.7 mmol/L      Calcium 8.8 mg/dL      Total Protein 6.9 g/dL      Albumin 3.6 g/dL      ALT (SGPT) 23 U/L      AST (SGOT) 24 U/L      Alkaline Phosphatase 66 U/L      Total Bilirubin 0.8 mg/dL      Globulin 3.3 gm/dL      A/G Ratio 1.1 g/dL      BUN/Creatinine Ratio 14.9     Anion Gap 11.3 mmol/L      eGFR 109.7 mL/min/1.73     Narrative:      GFR Normal >60  Chronic Kidney Disease <60  Kidney Failure <15               Imaging:    CT Soft Tissue Neck With Contrast    Result Date: 8/19/2023  PROCEDURE: CT  SOFT TISSUE NECK W CONTRAST  COMPARISON: None  INDICATIONS: Mass left submandibular region  PROTOCOL:   Standard imaging protocol performed    RADIATION:   DLP: 795.5mGy*cm   Automated exposure control was utilized to minimize radiation dose. CONTRAST: 80cc Isovue 370 I.V. LABS:   eGFR: >60ml/min/1.73m2  TECHNIQUE: After obtaining the patient's consent, CT images were obtained with non-ionic intravenous contrast material.   FINDINGS:  There is extensive fat stranding in the subcutaneous tissues of the left neck extending from the mid portion of the parotid gland through the submandibular gland the small amount of surrounding free fluid.  No loculated fluid collections to indicate soft tissue abscess.  The there is a probable 3 mm stone in the distal left parotid duct (axial CT image 60).  There are several mildly prominent intraparotid lymph nodes which are asymmetric with the contralateral right parotid gland.  Small left level 2 cervical lymph nodes around the parotid gland and carotid space are present likely reactive/infectious or inflammatory.  Soft tissue fat planes in the deep spaces of the neck are preserved.   Enhancing vascular structures are unremarkable.  Parapharyngeal soft tissues are symmetric bilaterally a normal appearance.  The epiglottis and pre epiglottic fat are preserved.  The vocal cords are symmetric.  The globes and orbital contents are normal and symmetric.  There is an incidental mucous retention cyst in the left maxillary sinus, paranasal sinuses as well as the mastoid air cells otherwise are clear.  Prevertebral soft tissues are unremarkable.  Mild multilevel degenerative changes are present in the cervical spine.       Acute left parotitis with suspected 3 mm stone in the distal left parotid duct.  Mild left level 2 and level 3 adenopathy is likely infectious/inflammatory.     BLANCA GATES DO       Electronically Signed and Approved By: BLANCA GATES DO on 8/19/2023 at 14:08                 Differential Diagnosis and Discussion:    Neck pain: Differential includes but is not limited to strep throat, thyroiditis, lymphadenopathy, sialadenitis, mass, tumor, brachial cleft cyst, goiter, degenerative disc disease.    All labs were reviewed and interpreted by me.  CT scan radiology impression was interpreted by me.    MDM     Amount and/or Complexity of Data Reviewed  Clinical lab tests: reviewed  Tests in the radiology section of [facility]: reviewed             Patient Care Considerations:    MRI: I considered ordering an MRI however this can be done as an outpatient if deemed necessary      Consultants/Shared Management Plan:    None    Social Determinants of Health:    Patient is independent, reliable, and has access to care.       Disposition and Care Coordination:    Discharged: I considered escalation of care by admitting this patient for observation, however the patient has improved and is suitable and  stable for discharge.    I have explained the patient's condition, diagnoses and treatment plan based on the information available to me at this time. I have answered questions and addressed any concerns. The patient has a good  understanding of the patient's diagnosis, condition, and treatment plan as can be expected at this point. The vital signs have been stable. The patient's condition is stable and appropriate for discharge from the emergency department.      The patient will pursue further outpatient evaluation with the primary care physician or other designated or consulting physician as outlined in the discharge instructions. They are agreeable to this plan of care and follow-up instructions have been explained in detail. The patient has received these instructions in written format and have expressed an understanding of the discharge instructions. The patient is aware that any significant change in condition or worsening of symptoms should prompt an immediate return to this or the closest  emergency department or call to 911.    Final diagnoses:   Acute sialoadenitis   Parotid sialolithiasis        ED Disposition       ED Disposition   Discharge    Condition   Stable    Comment   --               This medical record created using voice recognition software.             Kevin Bautista DO  08/20/23 0648

## 2023-08-19 NOTE — DISCHARGE INSTRUCTIONS
Eat frequent lemon heads or sour candies to produce extra saliva.  Take Tylenol and or Motrin for pain.  Take the antibiotics as prescribed.  Follow-up with ENT.  I gave the name of one of our on-call ENT doctors.  You may follow-up with an ENT of your choice.  With your primary care provider as needed.  Return to the ER for any other concerns issues that may arise including increasing pain, increased swelling, temperature greater than 101, or any other concerns issues that may arise.

## 2023-08-22 ENCOUNTER — HOSPITAL ENCOUNTER (EMERGENCY)
Facility: HOSPITAL | Age: 53
Discharge: HOME | End: 2023-08-22
Attending: EMERGENCY MEDICINE
Payer: COMMERCIAL

## 2023-08-22 VITALS
HEIGHT: 68 IN | OXYGEN SATURATION: 96 % | HEART RATE: 55 BPM | DIASTOLIC BLOOD PRESSURE: 78 MMHG | WEIGHT: 315 LBS | BODY MASS INDEX: 47.74 KG/M2 | SYSTOLIC BLOOD PRESSURE: 121 MMHG | RESPIRATION RATE: 16 BRPM | TEMPERATURE: 98.6 F

## 2023-08-22 DIAGNOSIS — K11.5 PAROTID SIALOLITHIASIS: Primary | ICD-10-CM

## 2023-08-22 PROCEDURE — 99282 EMERGENCY DEPT VISIT SF MDM: CPT

## 2023-08-22 RX ORDER — CEPHALEXIN 500 MG/1
500 CAPSULE ORAL 3 TIMES DAILY
Qty: 21 CAPSULE | Refills: 0 | Status: SHIPPED | OUTPATIENT
Start: 2023-08-22

## 2023-08-22 NOTE — DISCHARGE INSTRUCTIONS
Swelling in the left side of your neck is due to a swollen salivary gland whose salivary outlet duct is blocked by a small stone or calcification.    Treatment for this includes drinking plenty fluids to stay well-hydrated, applying a warm compress and by gently massaging the swollen gland a few times a day as well as eating hard, tart candies like lemon drops to induce salivary flow.    You can try taking the antibiotics to prevent infection and make sure you get into see the ENT doctor this week.

## 2023-08-22 NOTE — ED PROVIDER NOTES
Time: 1:13 PM EDT  Date of encounter:  8/22/2023  Independent Historian/Clinical History and Information was obtained by:   Patient    History is limited by: N/A    Chief Complaint: Swollen salivary gland      History of Present Illness:  Patient is a 52 y.o. year old male who presents to the emergency department for evaluation of worsening swollen salivary gland x3 days.    He was seen in the ED 3 days ago and actually had CT imaging of soft tissue of the neck showing a swollen left parotid salivary gland with associated 3 mm salivary duct stone.  There was also a small amount of reactive lymphadenopathy nearby.    He is not having any fevers or erythema or warmth of the left side of his face but states it is gotten more swollen.    He never was able to get the antibiotic prescription filled to prevent infection, but he was able to get a scheduled ENT clinic appointment in the next 3 to 4 days.    No other concerns reported today.    HPI    Patient Care Team  Primary Care Provider: Jennifer Hobbs APRN    Past Medical History:     No Known Allergies  Past Medical History:   Diagnosis Date    Allergies     Anxiety 08/17/2021    Degeneration of lumbar intervertebral disc 02/05/2016    Fatigue 02/02/2023    GERD (gastroesophageal reflux disease)     Gout     High blood pressure     Hypertension     Impaired fasting glucose 03/15/2022    Male hypogonadism 07/28/2021    Mid back pain 02/24/2016    Obstructive sleep apnea 07/28/2021    Polycythemia, secondary 03/15/2022    Related to ETOH abuse     Pulmonary embolism     Tendinitis of elbow 07/16/2021    Thoracic back pain     Vitamin D deficiency 07/28/2021     Past Surgical History:   Procedure Laterality Date    CHOLECYSTECTOMY      GALLBLADDER SURGERY      HEMORRHOIDECTOMY      LUMBAR EPIDURAL INJECTION  2015    TONSILLECTOMY       Family History   Problem Relation Age of Onset    Hypertension Mother     Hypertension Father        Home Medications:  Prior to  Admission medications    Medication Sig Start Date End Date Taking? Authorizing Provider   allopurinol (ZYLOPRIM) 100 MG tablet Take 1 tablet by mouth Daily. 6/20/23   Jennifer Hobbs APRN   apixaban (Eliquis) 5 MG tablet tablet Take 1 tablet by mouth Every 12 (Twelve) Hours. 6/20/23   Jennifer Hobbs APRN   atenolol (TENORMIN) 25 MG tablet Take 1 tablet by mouth 2 (Two) Times a Day. 6/20/23   Jennifer Hobbs APRN   cephalexin (KEFLEX) 500 MG capsule Take 1 capsule by mouth 3 (Three) Times a Day. 8/22/23   Paolo Gore MD   Cholecalciferol (Vitamin D3) 50 MCG (2000 UT) capsule Take 1 capsule by mouth Daily. 4/5/23 4/4/24  Gina Damian MD   clindamycin (CLEOCIN) 300 MG capsule Take 1 capsule by mouth 4 (Four) Times a Day. 8/19/23   Kevin Bautista DO   cyclobenzaprine (FLEXERIL) 10 MG tablet Take 1 tablet by mouth 2 (Two) Times a Day As Needed for Muscle Spasms. 6/20/23   Jennifer Hobbs APRN   fluticasone (Flonase) 50 MCG/ACT nasal spray 2 sprays into the nostril(s) as directed by provider Daily. 3/29/22   Jennifer Hobbs APRN   folic acid (FOLVITE) 1 MG tablet Take 1 tablet by mouth 2 (Two) Times a Day. 6/20/23   Jennifer Hobbs APRN   hydroCHLOROthiazide (HYDRODIURIL) 50 MG tablet Take 1 tablet by mouth Daily. 6/20/23   Jennifer Hobbs APRN   lisinopril (PRINIVIL,ZESTRIL) 40 MG tablet Take 1 tablet by mouth Daily. 6/20/23   Jennifer Hobbs APRN   pantoprazole (PROTONIX) 40 MG EC tablet Take 1 tablet by mouth Daily. 6/20/23   Jennifer Hobbs APRN   vitamin D (ERGOCALCIFEROL) 1.25 MG (75703 UT) capsule capsule Take 1 capsule by mouth 1 (One) Time Per Week. Take with a meal. 6/20/23   Jennifer Hobbs APRN        Social History:   Social History     Tobacco Use    Smoking status: Never    Smokeless tobacco: Current     Types: Chew   Vaping Use    Vaping Use: Never used   Substance Use Topics    Alcohol use: Yes     Alcohol/week: 28.0 standard drinks     Types: 28 Standard drinks  "or equivalent per week    Drug use: Never         Review of Systems:  Review of Systems   I performed a 10 point review of systems which was all negative, except for the positives found in the HPI above.  Physical Exam:  /78 (BP Location: Right arm, Patient Position: Lying)   Pulse 55   Temp 98.6 øF (37 øC) (Oral)   Resp 16   Ht 172.7 cm (68\")   Wt (!) 146 kg (322 lb 8.5 oz)   SpO2 96%   BMI 49.04 kg/mý     Physical Exam   General: Awake alert and in no obvious distress    HEENT: Head normocephalic atraumatic, eyes PERRLA EOMI, nose normal, oropharynx normal.  Oral exam was normal without any purulent drainage or any fullness of the floor the mouth or signs of infection.    Neck: Supple full range of motion, no meningismus, there is a moderately large area of swelling over the left side of the face and neck consistent with enlarged parotid salivary gland but there is no overlying erythema or warmth or any significant tenderness.    Heart: Regular rate and rhythm, no murmurs or rubs, 2+ radial pulses bilaterally    Lungs: Clear to auscultation bilaterally without wheezes or crackles, no respiratory distress    Abdomen: Soft, nontender, nondistended, no rebound or guarding    Skin: Warm, dry, no rash    Musculoskeletal: Normal range of motion, no lower extremity edema    Neurologic: Oriented x3, no motor deficits no sensory deficits    Psychiatric: Mood appears stable, no psychosis          Procedures:  Procedures      Medical Decision Making:      Comorbidities that affect care:    None    External Notes reviewed:    Previous Radiological Studies: I reviewed his CT of the neck from 3 days ago revealing a parotid salivary gland enlargement and salivary duct stone.      The following orders were placed and all results were independently analyzed by me:  No orders of the defined types were placed in this encounter.      Medications Given in the Emergency Department:  Medications - No data to display     ED " Course:         Labs:    Lab Results (last 24 hours)       ** No results found for the last 24 hours. **             Imaging:    No Radiology Exams Resulted Within Past 24 Hours      Differential Diagnosis and Discussion:    Neck Pain: The patient presents with neck pain. My differential diagnosis includes but is not limited to acute spinal epidural abscess, acute spinal epidural bleed, meningitis, musculoskeletal neck pain, spinal fracture, and osteoarthritis.     CT scan radiology impression was interpreted by me.    MDM             This patient is a pleasant 52-year-old male who presents with continued swelling and discomfort from a left-sided parotid sialolithiasis.    He is not febrile or tachycardic and the area does not appear to be erythematous or warm or infected at all.    I will start him on prophylactic Keflex antibiotics for now in addition to supportive care instructions such as warm compresses, staying hydrated orally, gentle massage, hard and tart candies like lemon drops and close ENT follow-up.    There is no airway involvement or signs of Jerman's angina.                Patient Care Considerations:          Consultants/Shared Management Plan:        Social Determinants of Health:    Patient is independent, reliable, and has access to care.       Disposition and Care Coordination:    Discharged: The patient is suitable and stable for discharge with no need for consideration of observation or admission.    I have explained the patient's condition, diagnoses and treatment plan based on the information available to me at this time. I have answered questions and addressed any concerns. The patient has a good  understanding of the patient's diagnosis, condition, and treatment plan as can be expected at this point. The vital signs have been stable. The patient's condition is stable and appropriate for discharge from the emergency department.      The patient will pursue further outpatient evaluation with  the primary care physician or other designated or consulting physician as outlined in the discharge instructions. They are agreeable to this plan of care and follow-up instructions have been explained in detail. The patient has received these instructions in written format and have expressed an understanding of the discharge instructions. The patient is aware that any significant change in condition or worsening of symptoms should prompt an immediate return to this or the closest emergency department or call to 911.  I have explained discharge medications and the need for follow up with the patient/caretakers. This was also printed in the discharge instructions. Patient was discharged with the following medications and follow up:      Medication List        New Prescriptions      cephalexin 500 MG capsule  Commonly known as: KEFLEX  Take 1 capsule by mouth 3 (Three) Times a Day.               Where to Get Your Medications        These medications were sent to [facility] [id number] - [place] HUGHES - [address] AT [facility] [address] - [phone]  - [phone]   [address], [place] [address]      Phone: [phone]   cephalexin 500 MG capsule      Vincent Carrion MD  [address]  [address]  [place] [address]  [phone]    Call in 1 day  for a follow-up appointment    Make sure you follow-up at the ENT clinic as directed.             Final diagnoses:   Parotid sialolithiasis        ED Disposition       ED Disposition   Discharge    Condition   Stable    Comment   --               This medical record created using voice recognition software.             Paolo Gore MD  08/22/23 [id number]

## 2023-08-28 ENCOUNTER — OFFICE VISIT (OUTPATIENT)
Dept: OTOLARYNGOLOGY | Facility: CLINIC | Age: 53
End: 2023-08-28
Payer: COMMERCIAL

## 2023-08-28 VITALS
BODY MASS INDEX: 47.74 KG/M2 | DIASTOLIC BLOOD PRESSURE: 77 MMHG | SYSTOLIC BLOOD PRESSURE: 114 MMHG | TEMPERATURE: 96.1 F | HEIGHT: 68 IN | WEIGHT: 315 LBS | HEART RATE: 73 BPM

## 2023-08-28 DIAGNOSIS — K11.21 ACUTE PAROTITIS: Primary | ICD-10-CM

## 2023-08-28 PROCEDURE — 99203 OFFICE O/P NEW LOW 30 MIN: CPT | Performed by: OTOLARYNGOLOGY

## 2023-08-28 NOTE — PROGRESS NOTES
Patient Name: Kevin Bradshaw   Visit Date: 08/28/2023   Patient ID: [id number]  Provider: Vincent Carrion MD    Sex: male  Location: [facility]   YOB: 1970  Location Address: [address] [place] [address], [address],?[place]?[address]    Primary Care Provider Jennifer Hobbs APRN  Location Phone: [phone]    Referring Provider: Kevin Bautista DO        Chief Complaint  Parotid/neck swelling    History of Present Illness  Kevin Bradshaw is a 52 y.o. male who presents to [facility] today as a consult from Kevin Bautista DO for evaluation of left-sided parotitis.  He tells me that he acutely developed left-sided facial swelling and some discomfort.  He has not had any problems with this previously.  He did not experience any trismus or facial weakness.  He has not had any difficulty with xerostomia.  He does have a history of gout for which he is on allopurinol and his uric acid levels have been within the normal range.  He was seen by the emergency department on 8/22/2023 at which time he was placed on a course of Keflex after previously being on clindamycin on 8/19/2023.  Previous CT scan of the neck with contrast on 8/19/2023 revealed findings consistent with acute left parotitis.  There is a suspected 3 mm stone in the distal left Stensen's duct but the only stone visible is in the actual posterior portion of the gland itself.    Past Medical History:   Diagnosis Date    Allergies     Anxiety 08/17/2021    Degeneration of lumbar intervertebral disc 02/05/2016    Fatigue 02/02/2023    GERD (gastroesophageal reflux disease)     Gout     High blood pressure     Hypertension     Impaired fasting glucose 03/15/2022    Male hypogonadism 07/28/2021    Mid back pain 02/24/2016    Obstructive sleep apnea 07/28/2021    Polycythemia, secondary 03/15/2022    Related to ETOH abuse     Pulmonary embolism     Tendinitis of elbow 07/16/2021    Thoracic back pain      Vitamin D deficiency 07/28/2021       Past Surgical History:   Procedure Laterality Date    CHOLECYSTECTOMY      GALLBLADDER SURGERY      HEMORRHOIDECTOMY      LUMBAR EPIDURAL INJECTION  2015    TONSILLECTOMY           Current Outpatient Medications:     allopurinol (ZYLOPRIM) 100 MG tablet, Take 1 tablet by mouth Daily., Disp: 30 tablet, Rfl: 5    apixaban (Eliquis) 5 MG tablet tablet, Take 1 tablet by mouth Every 12 (Twelve) Hours., Disp: 60 tablet, Rfl: 5    atenolol (TENORMIN) 25 MG tablet, Take 1 tablet by mouth 2 (Two) Times a Day., Disp: 60 tablet, Rfl: 5    cyclobenzaprine (FLEXERIL) 10 MG tablet, Take 1 tablet by mouth 2 (Two) Times a Day As Needed for Muscle Spasms., Disp: 30 tablet, Rfl: 5    fluticasone (Flonase) 50 MCG/ACT nasal spray, 2 sprays into the nostril(s) as directed by provider Daily., Disp: 9.9 g, Rfl: 3    folic acid (FOLVITE) 1 MG tablet, Take 1 tablet by mouth 2 (Two) Times a Day., Disp: 60 tablet, Rfl: 5    hydroCHLOROthiazide (HYDRODIURIL) 50 MG tablet, Take 1 tablet by mouth Daily., Disp: 30 tablet, Rfl: 5    lisinopril (PRINIVIL,ZESTRIL) 40 MG tablet, Take 1 tablet by mouth Daily., Disp: 30 tablet, Rfl: 5    pantoprazole (PROTONIX) 40 MG EC tablet, Take 1 tablet by mouth Daily., Disp: 30 tablet, Rfl: 5    vitamin D (ERGOCALCIFEROL) 1.25 MG (14433 UT) capsule capsule, Take 1 capsule by mouth 1 (One) Time Per Week. Take with a meal., Disp: 12 capsule, Rfl: 3    cephalexin (KEFLEX) 500 MG capsule, Take 1 capsule by mouth 3 (Three) Times a Day., Disp: 21 capsule, Rfl: 0    Cholecalciferol (Vitamin D3) 50 MCG (2000 UT) capsule, Take 1 capsule by mouth Daily., Disp: 90 capsule, Rfl: 1     No Known Allergies    Social History     Tobacco Use    Smoking status: Never    Smokeless tobacco: Current     Types: Chew   Vaping Use    Vaping Use: Never used   Substance Use Topics    Alcohol use: Yes     Alcohol/week: 28.0 standard drinks     Types: 28 Standard drinks or equivalent per week    Drug  "use: Never        Objective     Vital Signs:   /77   Pulse 73   Temp 96.1 øF (35.6 øC)   Ht 172.7 cm (68\")   Wt (!) 146 kg (321 lb 3.2 oz)   BMI 48.84 kg/mý       Physical Exam    General: Well developed, well nourished patient of stated age in no acute distress. Voice is strong and clear.   Head: Normocephalic and atraumatic.  Face: No lesions.  Bilateral parotid and submandibular glands are unremarkable.  Stensen's and Warthin's ducts are productive of clear saliva bilaterally.  No stone is palpable upon bimanual palpation.  House-Brackmann I/VI     bilaterally.   muscles and temporomandibular joint nontender to palpation.  No TMJ crepitus.  Eyes: PERRLA, sclerae anicteric, no conjunctival injection. Extraocular movements are intact and full. No nystagmus.   Ears: Auricles are normal in appearance. Bilateral external auditory canals are unremarkable. Bilateral tympanic membranes are clear and without effusion. Hearing normal to conversational voice.   Nose: External nose is normal in appearance. Bilateral nares are patent with normal appearing mucosa. Septum midline. Turbinates are unremarkable. No lesions.   Oral Cavity: Lips are normal in appearance. Oral mucosa is unremarkable. Gingiva is unremarkable. Normal dentition for age. Tongue is unremarkable with good movement. Hard palate is unremarkable.   Oropharynx: Soft palate is unremarkable with full movement. Uvula is unremarkable. Bilateral tonsils are unremarkable. Posterior oropharynx is unremarkable.    Larynx and hypopharynx: Deferred secondary to gag reflex.  Neck: Supple.  No mass.  Nontender to palpation.  Trachea midline. Thyroid normal size and without nodules to palpation.   Lymphatic: No lymphadenopathy upon palpation.  Respiratory: Clear to auscultation bilaterally, nonlabored respirations    Cardiovascular: RRR, no murmurs, rubs, or gallops,   Psychiatric: Appropriate affect, cooperative   Neurologic: Oriented x 3, strength " symmetric in all extremities, Cranial Nerves II-XII are grossly intact to confrontation   Skin: Warm and dry. No rashes.    Procedures           Result Review :               Assessment and Plan    Diagnoses and all orders for this visit:    1. Acute parotitis (Primary)    Patient's and findings were discussed at great length.  Currently, he is seen for evaluation after an episode of acute left parotitis for which he was treated with Keflex.  We reviewed and discussed the images from his CT scan of the neck with contrast on 8/19/2023 which revealed mild left parotid inflammation and a stone within the substance of the posterior gland.  There is no obvious stone in the duct itself on imaging or on bimanual palpation today.  He does not typically drink water well throughout the day as he is not allowed to drink any liquids while driving.  We discussed increasing hydration and using sialagogues may be helpful to prevent any further episodes.  We also discussed heat and gland massage when acutely symptomatic.  If he has any further trouble I will refer him for sialendoscopy.  He was given ample time to ask questions, all of which were answered to his satisfaction.        Follow Up   No follow-ups on file.  Patient was given instructions and counseling regarding his condition or for health maintenance advice. Please see specific information pulled into the AVS if appropriate.

## 2023-10-16 PROCEDURE — 87081 CULTURE SCREEN ONLY: CPT

## 2023-12-19 NOTE — PROGRESS NOTES
Chief Complaint  Hypertension (6 MONTH FOLLOW UP. ), Right elbow pain, and Back Pain  Subjective    History of Present Illness  Kevin ENMA Bradshaw is a 52 y.o. male who presents to [facility] for His annual physical exam and follow-up of hypertension, history of pulmonary embolus, GERD,  allergies, chronic gout, prediabetes and vitamin D deficiency.  The patient is not having any medication side effects. No recent labs. Complaining of right elbow pain for 2-3 weeks and not improving. No known recent injury. He has history of tearing the tendons in that area.      Specialists: Dr. Camarena and Dr. Pozo.      Current Outpatient Medications:     allopurinol (ZYLOPRIM) 100 MG tablet, Take 1 tablet by mouth Daily., Disp: 30 tablet, Rfl: 5    apixaban (Eliquis) 5 MG tablet tablet, Take 1 tablet by mouth Every 12 (Twelve) Hours., Disp: 60 tablet, Rfl: 5    atenolol (TENORMIN) 25 MG tablet, Take 1 tablet by mouth 2 (Two) Times a Day., Disp: 60 tablet, Rfl: 5    Cholecalciferol (Vitamin D3) 50 MCG (2000 UT) capsule, Take 1 capsule by mouth Daily., Disp: 90 capsule, Rfl: 1    cyclobenzaprine (FLEXERIL) 10 MG tablet, Take 1 tablet by mouth 2 (Two) Times a Day As Needed for Muscle Spasms., Disp: 30 tablet, Rfl: 5    fluticasone (Flonase) 50 MCG/ACT nasal spray, 2 sprays into the nostril(s) as directed by provider Daily., Disp: 9.9 g, Rfl: 3    folic acid (FOLVITE) 1 MG tablet, Take 1 tablet by mouth 2 (Two) Times a Day., Disp: 60 tablet, Rfl: 5    hydroCHLOROthiazide (HYDRODIURIL) 50 MG tablet, Take 1 tablet by mouth Daily., Disp: 30 tablet, Rfl: 5    lisinopril (PRINIVIL,ZESTRIL) 40 MG tablet, Take 1 tablet by mouth Daily., Disp: 30 tablet, Rfl: 5    pantoprazole (PROTONIX) 40 MG EC tablet, Take 1 tablet by mouth Daily., Disp: 30 tablet, Rfl: 5    vitamin D (ERGOCALCIFEROL) 1.25 MG (23895 UT) capsule capsule, Take 1 capsule by mouth 1 (One) Time Per Week. Take with a meal., Disp: 4 capsule,  "Rfl: 5  No Known Allergies   Past Medical History:   Diagnosis Date    Allergies     Anxiety 08/17/2021    Degeneration of lumbar intervertebral disc 02/05/2016    Fatigue 02/02/2023    GERD (gastroesophageal reflux disease)     Gout     High blood pressure     Hypertension     Impaired fasting glucose 03/15/2022    Male hypogonadism 07/28/2021    Mid back pain 02/24/2016    Obstructive sleep apnea 07/28/2021    Polycythemia, secondary 03/15/2022    Related to ETOH abuse     Pulmonary embolism     Tendinitis of elbow 07/16/2021    Thoracic back pain     Vitamin D deficiency 07/28/2021      Past Surgical History:   Procedure Laterality Date    CHOLECYSTECTOMY      GALLBLADDER SURGERY      HEMORRHOIDECTOMY      LUMBAR EPIDURAL INJECTION  2015    TONSILLECTOMY          Objective   /82 (BP Location: Left arm, Patient Position: Sitting, Cuff Size: Large Adult)   Pulse 61   Temp 98.2 °F (36.8 °C) (Temporal)   Resp 16   Ht 172.7 cm (68\")   Wt (!) 142 kg (314 lb)   SpO2 97%   BMI 47.74 kg/m²    Estimated body mass index is 47.74 kg/m² as calculated from the following:    Height as of this encounter: 172.7 cm (68\").    Weight as of this encounter: 142 kg (314 lb).     Physical Exam  Vitals reviewed.   Constitutional:       General: He is not in acute distress.  HENT:      Head: Normocephalic and atraumatic.      Right Ear: Tympanic membrane and ear canal normal.      Left Ear: Tympanic membrane and ear canal normal.   Eyes:      Conjunctiva/sclera: Conjunctivae normal.   Cardiovascular:      Rate and Rhythm: Normal rate and regular rhythm.      Heart sounds: Normal heart sounds. No murmur heard.  Pulmonary:      Effort: Pulmonary effort is normal.      Breath sounds: Normal breath sounds. No wheezing, rhonchi or rales.   Abdominal:      General: There is no distension.      Palpations: Abdomen is soft. There is no mass.      Tenderness: There is no abdominal tenderness.   Musculoskeletal:      Right elbow: " Tenderness present in lateral epicondyle.      Right lower leg: No edema.      Left lower leg: No edema.   Lymphadenopathy:      Cervical: No cervical adenopathy.   Skin:     General: Skin is warm and dry.      Coloration: Skin is not jaundiced or pale.   Neurological:      General: No focal deficit present.      Mental Status: He is alert.   Psychiatric:         Mood and Affect: Mood normal.         Thought Content: Thought content normal.          Result Review :  The following data was reviewed by: AIDAN Finnegan on 12/20/2023:  Common labs          4/4/2023    12:00 8/19/2023    11:30 8/19/2023    12:24   Common Labs   Glucose 107   114    BUN 7   11    Creatinine 0.78   0.74    Sodium 139   131    Potassium 4.5   4.1    Chloride 101   95    Calcium 9.9   8.8    Albumin 4.0   3.6    Total Bilirubin 0.9   0.8    Alkaline Phosphatase 66   66    AST (SGOT) 28   24    ALT (SGPT) 25   23    WBC 10.85  7.65     Hemoglobin 17.6  17.4     Hematocrit 52.0  51.6     Platelets 353  235     Total Cholesterol 169      Triglycerides 95      HDL Cholesterol 49      LDL Cholesterol  103      Hemoglobin A1C 5.90      Uric Acid 6.8           - Injection Tendon or Ligament    Date/Time: 12/20/2023 11:45 AM    Performed by: Jennifer Hobbs APRN  Authorized by: Jennifer Hobbs APRN  Preparation: Patient was prepped and draped in the usual sterile fashion.  Local anesthesia used: yes    Anesthesia:  Local anesthesia used: yes  Local Anesthetic: topical anesthetic  Patient tolerance: patient tolerated the procedure well with no immediate complications  Comments: Risk and benefits discussed and consent form obtained.  Immediately prior to procedure a time out was called to verify the correct patient, procedure, equipment, support staff and site/side marked as required. Under aseptic conditions, I injected 0.5 mL Depo-Medrol (40 mg/mL) and 1 mL of 1% lidocaine into the right elbow at the point of maximum  tenderness.  Medications administered: 20 mg methylPREDNISolone acetate 40 MG/ML; 1 mL lidocaine 1% - EPINEPHrine 1:[id number] 1 %-1:[id number]              Assessment and Plan   Diagnoses and all orders for this visit:    1. Annual physical exam (Primary)  -     Comprehensive Metabolic Panel; Future  -     CBC & Differential; Future  -     Lipid Panel; Future  -     T4, Free; Future  -     TSH; Future  -     Magnesium; Future  -     Folate; Future  -     Vitamin B12; Future    2. Essential hypertension  -     Magnesium; Future  -     Folate; Future  -     Vitamin B12; Future    3. History of pulmonary embolus (PE)    4. Gastro-esophageal reflux disease without esophagitis  -     pantoprazole (PROTONIX) 40 MG EC tablet; Take 1 tablet by mouth Daily.  Dispense: 30 tablet; Refill: 5    5. Seasonal allergic rhinitis, unspecified trigger    6. Chronic gouty arthritis    7. Impaired fasting glucose  -     Hemoglobin A1c; Future    8. Vitamin D deficiency  -     vitamin D (ERGOCALCIFEROL) 1.25 MG (68713 UT) capsule capsule; Take 1 capsule by mouth 1 (One) Time Per Week. Take with a meal.  Dispense: 4 capsule; Refill: 5  -     Vitamin D,25-Hydroxy; Future    9. Muscle tightness  -     cyclobenzaprine (FLEXERIL) 10 MG tablet; Take 1 tablet by mouth 2 (Two) Times a Day As Needed for Muscle Spasms.  Dispense: 30 tablet; Refill: 5    10. Right elbow tendonitis  -     - Injection Tendon or Ligament    11. Need for hepatitis C screening test    12. Screening for malignant neoplasm of prostate  -     PSA Screen; Future    Other orders  -     allopurinol (ZYLOPRIM) 100 MG tablet; Take 1 tablet by mouth Daily.  Dispense: 30 tablet; Refill: 5  -     apixaban (Eliquis) 5 MG tablet tablet; Take 1 tablet by mouth Every 12 (Twelve) Hours.  Dispense: 60 tablet; Refill: 5  -     atenolol (TENORMIN) 25 MG tablet; Take 1 tablet by mouth 2 (Two) Times a Day.  Dispense: 60 tablet; Refill: 5  -     hydroCHLOROthiazide (HYDRODIURIL) 50 MG tablet; Take  1 tablet by mouth Daily.  Dispense: 30 tablet; Refill: 5  -     lisinopril (PRINIVIL,ZESTRIL) 40 MG tablet; Take 1 tablet by mouth Daily.  Dispense: 30 tablet; Refill: 5      Annual exam: Discussed healthy diet, exercise, adequate sleep, cancer screening, immunizations and preventative care. Annual eye exam and twice yearly dental cleaning.    Hypertension: Stable on lisinopril 40 mg daily, HCTZ 50 mg daily and atenolol 25 mg twice a daily and to continue.     History of PE: Stable on Eliquis 5 mg twice a day and to continue.     GERD: Stable on Protonix 40 mg daily and to continue.     Allergies: Stable on fluticasone nasal spray as needed and to continue.     Chronic gout: Stable on allopurinol 100 mg daily and to continue.     Pre-diabetes: 2 months ago HA1C 5.90.  Monitor.     Vitamin D deficiency: 2 months ago vitamin D level 11.6.  Continue vitamin D 50,000 units weekly with a meal.  Check lab next visit.     Muscle tightness: Flexeril 10 mg as needed muscle spasms/tightness twice daily. Continue stretching.    Right elbow tendonitis: Injection today and follow-up if not better. Post injection instructions include the followin.  Avoid excessive activity for 24 to 48 hours.  2.  Gradual return to full activity.  3.  Apply ice 3 times per day for 3 days.  4.  May take over-the-counter pain reliever as directed.  5.  Follow-up in 1 to 2 weeks if not improving.    Patient was given instructions and counseling regarding his condition or for health maintenance advice. Please see specific information pulled into the AVS if appropriate.     Follow Up   Return in about 6 months (around 2024) for Recheck.    Dictated Utilizing Dragon Dictation.  Please note that portions of this note were completed with a voice recognition program.  Part of this note may be an electronic transcription/translation of spoken language to printed text using the Dragon Dictation System.    AIDAN Finnegan

## 2023-12-20 ENCOUNTER — OFFICE VISIT (OUTPATIENT)
Dept: INTERNAL MEDICINE | Facility: CLINIC | Age: 53
End: 2023-12-20
Payer: MEDICAID

## 2023-12-20 VITALS
HEART RATE: 61 BPM | BODY MASS INDEX: 47.59 KG/M2 | RESPIRATION RATE: 16 BRPM | WEIGHT: 314 LBS | SYSTOLIC BLOOD PRESSURE: 125 MMHG | TEMPERATURE: 98.2 F | DIASTOLIC BLOOD PRESSURE: 82 MMHG | OXYGEN SATURATION: 97 % | HEIGHT: 68 IN

## 2023-12-20 DIAGNOSIS — K21.9 GASTRO-ESOPHAGEAL REFLUX DISEASE WITHOUT ESOPHAGITIS: ICD-10-CM

## 2023-12-20 DIAGNOSIS — Z00.00 ANNUAL PHYSICAL EXAM: Primary | ICD-10-CM

## 2023-12-20 DIAGNOSIS — M62.89 MUSCLE TIGHTNESS: ICD-10-CM

## 2023-12-20 DIAGNOSIS — J30.2 SEASONAL ALLERGIC RHINITIS, UNSPECIFIED TRIGGER: ICD-10-CM

## 2023-12-20 DIAGNOSIS — Z86.711 HISTORY OF PULMONARY EMBOLUS (PE): ICD-10-CM

## 2023-12-20 DIAGNOSIS — M1A.00X0 CHRONIC GOUTY ARTHRITIS: ICD-10-CM

## 2023-12-20 DIAGNOSIS — Z11.59 NEED FOR HEPATITIS C SCREENING TEST: ICD-10-CM

## 2023-12-20 DIAGNOSIS — I10 ESSENTIAL HYPERTENSION: ICD-10-CM

## 2023-12-20 DIAGNOSIS — R73.01 IMPAIRED FASTING GLUCOSE: ICD-10-CM

## 2023-12-20 DIAGNOSIS — E55.9 VITAMIN D DEFICIENCY: ICD-10-CM

## 2023-12-20 DIAGNOSIS — Z12.5 SCREENING FOR MALIGNANT NEOPLASM OF PROSTATE: ICD-10-CM

## 2023-12-20 DIAGNOSIS — M77.8 RIGHT ELBOW TENDONITIS: ICD-10-CM

## 2023-12-20 RX ORDER — PANTOPRAZOLE SODIUM 40 MG/1
40 TABLET, DELAYED RELEASE ORAL DAILY
Qty: 30 TABLET | Refills: 5 | Status: SHIPPED | OUTPATIENT
Start: 2023-12-20

## 2023-12-20 RX ORDER — ALLOPURINOL 100 MG/1
100 TABLET ORAL DAILY
Qty: 30 TABLET | Refills: 5 | Status: SHIPPED | OUTPATIENT
Start: 2023-12-20

## 2023-12-20 RX ORDER — HYDROCHLOROTHIAZIDE 50 MG/1
50 TABLET ORAL DAILY
Qty: 30 TABLET | Refills: 5 | Status: SHIPPED | OUTPATIENT
Start: 2023-12-20

## 2023-12-20 RX ORDER — CYCLOBENZAPRINE HCL 10 MG
10 TABLET ORAL 2 TIMES DAILY PRN
Qty: 30 TABLET | Refills: 5 | Status: SHIPPED | OUTPATIENT
Start: 2023-12-20

## 2023-12-20 RX ORDER — ATENOLOL 25 MG/1
25 TABLET ORAL 2 TIMES DAILY
Qty: 60 TABLET | Refills: 5 | Status: SHIPPED | OUTPATIENT
Start: 2023-12-20

## 2023-12-20 RX ORDER — LISINOPRIL 40 MG/1
40 TABLET ORAL DAILY
Qty: 30 TABLET | Refills: 5 | Status: SHIPPED | OUTPATIENT
Start: 2023-12-20

## 2023-12-20 RX ORDER — LIDOCAINE HYDROCHLORIDE AND EPINEPHRINE 10; 10 MG/ML; UG/ML
1 INJECTION, SOLUTION INFILTRATION; PERINEURAL
Status: COMPLETED | OUTPATIENT
Start: 2023-12-20 | End: 2023-12-20

## 2023-12-20 RX ORDER — ERGOCALCIFEROL 1.25 MG/1
50000 CAPSULE ORAL WEEKLY
Qty: 4 CAPSULE | Refills: 5 | Status: SHIPPED | OUTPATIENT
Start: 2023-12-20

## 2023-12-20 RX ORDER — METHYLPREDNISOLONE ACETATE 40 MG/ML
20 INJECTION, SUSPENSION INTRA-ARTICULAR; INTRALESIONAL; INTRAMUSCULAR; SOFT TISSUE
Status: COMPLETED | OUTPATIENT
Start: 2023-12-20 | End: 2023-12-20

## 2023-12-20 RX ADMIN — METHYLPREDNISOLONE ACETATE 20 MG: 40 INJECTION, SUSPENSION INTRA-ARTICULAR; INTRALESIONAL; INTRAMUSCULAR; SOFT TISSUE at 11:45

## 2023-12-20 RX ADMIN — LIDOCAINE HYDROCHLORIDE AND EPINEPHRINE 1 ML: 10; 10 INJECTION, SOLUTION INFILTRATION; PERINEURAL at 11:45

## 2024-01-03 ENCOUNTER — HOSPITAL ENCOUNTER (OUTPATIENT)
Dept: GENERAL RADIOLOGY | Facility: HOSPITAL | Age: 54
Discharge: HOME | End: 2024-01-03
Admitting: INTERNAL MEDICINE
Payer: COMMERCIAL

## 2024-01-03 ENCOUNTER — OFFICE VISIT (OUTPATIENT)
Dept: INTERNAL MEDICINE | Facility: CLINIC | Age: 54
End: 2024-01-03
Payer: COMMERCIAL

## 2024-01-03 VITALS
BODY MASS INDEX: 47.74 KG/M2 | HEIGHT: 68 IN | SYSTOLIC BLOOD PRESSURE: 122 MMHG | WEIGHT: 315 LBS | HEART RATE: 63 BPM | DIASTOLIC BLOOD PRESSURE: 82 MMHG | TEMPERATURE: 97.8 F | OXYGEN SATURATION: 97 %

## 2024-01-03 DIAGNOSIS — S86.911A KNEE STRAIN, RIGHT, INITIAL ENCOUNTER: Primary | ICD-10-CM

## 2024-01-03 DIAGNOSIS — M23.41 LOOSE BODY OF KNEE, RIGHT: ICD-10-CM

## 2024-01-03 DIAGNOSIS — S86.911A KNEE STRAIN, RIGHT, INITIAL ENCOUNTER: ICD-10-CM

## 2024-01-03 PROBLEM — M25.561 ACUTE PAIN OF RIGHT KNEE: Status: ACTIVE | Noted: 2024-01-03

## 2024-01-03 PROCEDURE — 73562 X-RAY EXAM OF KNEE 3: CPT

## 2024-01-03 RX ORDER — METHYLPREDNISOLONE 4 MG/1
TABLET ORAL
Qty: 21 TABLET | Refills: 0 | Status: SHIPPED | OUTPATIENT
Start: 2024-01-03 | End: 2024-01-09

## 2024-01-03 NOTE — PROGRESS NOTES
CHIEF COMPLAINT  Kevin Bradshaw presents to [facility] for follow-up of pain right knee pain on the inside of leg  (Started feeling it at like 5 AM today ).    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       Current Outpatient Medications:     allopurinol (ZYLOPRIM) 100 MG tablet, Take 1 tablet by mouth Daily., Disp: 30 tablet, Rfl: 5    apixaban (Eliquis) 5 MG tablet tablet, Take 1 tablet by mouth Every 12 (Twelve) Hours., Disp: 60 tablet, Rfl: 5    atenolol (TENORMIN) 25 MG tablet, Take 1 tablet by mouth 2 (Two) Times a Day., Disp: 60 tablet, Rfl: 5    Cholecalciferol (Vitamin D3) 50 MCG (2000 UT) capsule, Take 1 capsule by mouth Daily., Disp: 90 capsule, Rfl: 1    cyclobenzaprine (FLEXERIL) 10 MG tablet, Take 1 tablet by mouth 2 (Two) Times a Day As Needed for Muscle Spasms., Disp: 30 tablet, Rfl: 5    fluticasone (Flonase) 50 MCG/ACT nasal spray, 2 sprays into the nostril(s) as directed by provider Daily., Disp: 9.9 g, Rfl: 3    folic acid (FOLVITE) 1 MG tablet, Take 1 tablet by mouth 2 (Two) Times a Day., Disp: 60 tablet, Rfl: 5    hydroCHLOROthiazide (HYDRODIURIL) 50 MG tablet, Take 1 tablet by mouth Daily., Disp: 30 tablet, Rfl: 5    lisinopril (PRINIVIL,ZESTRIL) 40 MG tablet, Take 1 tablet by mouth Daily., Disp: 30 tablet, Rfl: 5    pantoprazole (PROTONIX) 40 MG EC tablet, Take 1 tablet by mouth Daily., Disp: 30 tablet, Rfl: 5    vitamin D (ERGOCALCIFEROL) 1.25 MG (43608 UT) capsule capsule, Take 1 capsule by mouth 1 (One) Time Per Week. Take with a meal., Disp: 4 capsule, Rfl: 5    methylPREDNISolone (MEDROL) 4 MG dose pack, Take 6 tablets by mouth Daily for 1 day, THEN 5 tablets Daily for 1 day, THEN 4 tablets Daily for 1  "day, THEN 3 tablets Daily for 1 day, THEN 2 tablets Daily for 1 day, THEN 1 tablet Daily for 1 day. Take as directed on package instructions., Disp: 21 tablet, Rfl: 0   PFSH reviewed.      OBJECTIVE  Vital Signs  Vitals:    01/03/24 1037   BP: 122/82   BP Location: Left arm   Patient Position: Sitting   Cuff Size: Large Adult   Pulse: 63   Temp: 97.8 °F (36.6 °C)   TempSrc: Temporal   SpO2: 97%   Weight: (!) 143 kg (315 lb 12.8 oz)   Height: 172.7 cm (68\")      Body mass index is 48.02 kg/m².    Physical Exam  Vitals and nursing note reviewed.   Constitutional:       Appearance: Normal appearance.   HENT:      Head: Normocephalic and atraumatic.      Nose: Nose normal.   Eyes:      Extraocular Movements: Extraocular movements intact.      Pupils: Pupils are equal, round, and reactive to light.   Cardiovascular:      Rate and Rhythm: Normal rate and regular rhythm.   Pulmonary:      Effort: Pulmonary effort is normal.      Breath sounds: Normal breath sounds.   Abdominal:      General: Abdomen is flat.      Palpations: Abdomen is soft.   Musculoskeletal:      Cervical back: Normal range of motion and neck supple.      Comments: Positive crepitus bilaterally decrease in motion of the right knee   Skin:     General: Skin is warm and dry.   Neurological:      General: No focal deficit present.      Mental Status: He is alert and oriented to person, place, and time.   Psychiatric:         Mood and Affect: Mood normal.         Behavior: Behavior normal.          RESULTS REVIEW  Lab Results   Component Value Date     03/22/2021    BNP 53 01/27/2021     CMP          4/4/2023    12:00 8/19/2023    12:24   CMP   Glucose 107  114    BUN 7  11    Creatinine 0.78  0.74    EGFR 108.0  109.7    Sodium 139  131    Potassium 4.5  4.1    Chloride 101  95    Calcium 9.9  8.8    Total Protein 7.5  6.9    Albumin 4.0  3.6    Globulin 3.5  3.3    Total Bilirubin 0.9  0.8    Alkaline Phosphatase 66  66    AST (SGOT) 28  24    ALT " (SGPT) 25  23    Albumin/Globulin Ratio 1.1  1.1    BUN/Creatinine Ratio 9.0  14.9    Anion Gap 11.5  11.3      CBC w/diff          4/4/2023    12:00 8/19/2023    11:30   CBC w/Diff   WBC 10.85  7.65    RBC 5.81  5.76    Hemoglobin 17.6  17.4    Hematocrit 52.0  51.6    MCV 89.5  89.6    MCH 30.3  30.2    MCHC 33.8  33.7    RDW 12.6  14.9    Platelets 353  235    Neutrophil Rel % 71.2  65.2    Immature Granulocyte Rel % 0.6  0.4    Lymphocyte Rel % 15.7  19.6    Monocyte Rel % 11.1  12.3    Eosinophil Rel % 0.8  1.6    Basophil Rel % 0.6  0.9       Lipid Panel          4/4/2023    12:00   Lipid Panel   Total Cholesterol 169    Triglycerides 95    HDL Cholesterol 49    VLDL Cholesterol 17    LDL Cholesterol  103    LDL/HDL Ratio 2.06       Lab Results   Component Value Date    TSH 1.640 04/04/2023    TSH 4.120 08/14/2021    TSH 3.800 01/16/2021      Lab Results   Component Value Date    FREET4 1.23 04/04/2023    FREET4 1.27 08/14/2021    FREET4 1.3 01/16/2021      A1C Last 3 Results          4/4/2023    12:00   HGBA1C Last 3 Results   Hemoglobin A1C 5.90       Lab Results   Component Value Date    XKIBXPKA29 579 04/04/2023    LDMG89KB 11.6 (L) 04/04/2023    MG 1.6 04/04/2023        No Images in the past 120 days found..       Right knee pain-do xray of knee-use medrol dose tico-f/u 2 weeks with PCP -may need Ortho /steroid injection or other tx  Work excuse  for today /tomorrow-RTW 1/5/24      ASSESSMENT & PLAN  Diagnoses and all orders for this visit:    1. Knee strain, right, initial encounter (Primary)  Comments:  Chest x-ray of right knee try Medrol Dosepak follow-up with PCP in 1 to 2 weeks if not better may need Ortho consult.  Orders:  -     XR Knee 3 View Right; Future  -     methylPREDNISolone (MEDROL) 4 MG dose pack; Take 6 tablets by mouth Daily for 1 day, THEN 5 tablets Daily for 1 day, THEN 4 tablets Daily for 1 day, THEN 3 tablets Daily for 1 day, THEN 2 tablets Daily for 1 day, THEN 1 tablet Daily for 1  day. Take as directed on package instructions.  Dispense: 21 tablet; Refill: 0    Other orders  -     Cancel: POC T-Cup Urine Drug Screen                 Patient Instructions      Right knee pain-do xray of knee-use medrol dose tico-f/u 2 weeks with PCP -may need Ortho /steroid injection or other tx  Work excuse  for today /tomorrow-RTW 1/5/24       Addendum 1/3/2024  X-ray with mild osteoarthritis with 0.5 cm loose body posteriorly will refer to Ortho    FOLLOW UP  Return in about 2 weeks (around 1/17/2024) for Recheck.    Patient was given instructions and counseling regarding his condition or for health maintenance advice. Please see specific information pulled into the AVS if appropriate.

## 2024-01-03 NOTE — PATIENT INSTRUCTIONS
Right knee pain-do xray of knee-use medrol dose tico-f/u 2 weeks with PCP -may need Ortho /steroid injection or other tx  Work excuse  for today /tomorrow-RTW 1/5/24

## 2024-01-04 ENCOUNTER — TELEPHONE (OUTPATIENT)
Dept: INTERNAL MEDICINE | Facility: CLINIC | Age: 54
End: 2024-01-04

## 2024-01-09 ENCOUNTER — OFFICE VISIT (OUTPATIENT)
Dept: ORTHOPEDIC SURGERY | Facility: CLINIC | Age: 54
End: 2024-01-09
Payer: COMMERCIAL

## 2024-01-09 VITALS
HEIGHT: 68 IN | OXYGEN SATURATION: 95 % | DIASTOLIC BLOOD PRESSURE: 82 MMHG | BODY MASS INDEX: 47.74 KG/M2 | WEIGHT: 315 LBS | HEART RATE: 70 BPM | SYSTOLIC BLOOD PRESSURE: 119 MMHG

## 2024-01-09 DIAGNOSIS — M25.561 RIGHT KNEE PAIN, UNSPECIFIED CHRONICITY: Primary | ICD-10-CM

## 2024-01-09 DIAGNOSIS — M17.11 OSTEOARTHRITIS OF RIGHT KNEE, UNSPECIFIED OSTEOARTHRITIS TYPE: ICD-10-CM

## 2024-01-09 PROCEDURE — 99203 OFFICE O/P NEW LOW 30 MIN: CPT | Performed by: ORTHOPAEDIC SURGERY

## 2024-01-09 NOTE — PROGRESS NOTES
"Chief Complaint  Initial Evaluation of the Right Knee     Subjective      Kevin Bradshaw presents to [facility] for initial evaluation of the right knee. He is having pain on the medial aspect of the knee.  He had noted it started hurting last week.  He woke up one morning with pain.  He went and had X rays and is here to review.  He is on Eliquis.     No Known Allergies     Social History     Socioeconomic History    Marital status:    Tobacco Use    Smoking status: Never    Smokeless tobacco: Current     Types: Chew   Vaping Use    Vaping Use: Never used   Substance and Sexual Activity    Alcohol use: Yes     Alcohol/week: 28.0 standard drinks of alcohol     Types: 28 Standard drinks or equivalent per week     Comment: occais    Drug use: Never    Sexual activity: Defer        I reviewed the patient's chief complaint, history of present illness, review of systems, past medical history, surgical history, family history, social history, medications, and allergy list.     Review of Systems     Constitutional: Denies fevers, chills, weight loss  Cardiovascular: Denies chest pain, shortness of breath  Skin: Denies rashes, acute skin changes  Neurologic: Denies headache, loss of consciousness        Vital Signs:   /82   Pulse 70   Ht 172.7 cm (68\")   Wt (!) 145 kg (320 lb)   SpO2 95%   BMI 48.66 kg/m²          Physical Exam  General: Alert. No acute distress    Ortho Exam        RIGHT KNEE Flexion 120. Extension 0. Stable to varus/valgus stress. Stable to anterior/posterior drawer. Neurovascularly intact. Negative Roxana. Negative Lachman. Positive EHL, FHL, HS and TA. Sensation intact to light touch all 5 nerves of the foot. Ambulates with Non-antalgic gait. Patella is well tracking. Calf supple, non-tender. Positive tenderness to the medial joint line. Negative tenderness to the lateral joint line. Negative Crepitus. Good strength to hamstrings, quadriceps, " dorsiflexors, and plantar flexors.  Knee Extensor Mechanism intact       Procedures      Imaging Results (Most Recent)       None             Result Review :         XR Knee 3 View Right    Result Date: 1/3/2024  Narrative: PROCEDURE: XR KNEE 3 VW RIGHT  COMPARISON: [facility], , KNEE 3 VIEWS RT, 8/31/2005, 10:51.  INDICATIONS: MEDIAL RIGHT KNEE PAIN, NKI SINCE THIS AM  FINDINGS:  No fracture or malalignment is identified.  No joint effusion is identified.  Mild osteoarthritic spurring is noted.  In the posterior joint is a 0.5 cm loose body.      Impression:   1. Mild osteoarthritis with 0.5 cm loose body.      Frederic Hogan M.D.       Electronically Signed and Approved By: Frederic Hogan M.D. on 1/03/2024 at 14:54                     Assessment and Plan     Diagnoses and all orders for this visit:    1. Right knee pain, unspecified chronicity (Primary)    2. Osteoarthritis of right knee, unspecified osteoarthritis type        Discussed the treatment plan with the patient. I reviewed the X-rays that were obtained 1/3/24 with the patient.     Prescribed anti inflammatory.  HEP exercises.       Educated on risk of smoking/nicotine. Discussed options for smoking cessation. and Call or return if worsening symptoms.    Follow Up     PRN      Patient was given instructions and counseling regarding his condition or for health maintenance advice. Please see specific information pulled into the AVS if appropriate.     Scribed for Cm Rosenthal MD by Andreina Morales MA.  01/09/24   15:47 EST    I have personally performed the services described in this document as scribed by the above individual and it is both accurate and complete. Cm Rosenthal MD 01/09/24

## 2024-01-15 RX ORDER — ALLOPURINOL 100 MG/1
100 TABLET ORAL DAILY
Qty: 30 TABLET | Refills: 5 | OUTPATIENT
Start: 2024-01-15

## 2024-01-31 NOTE — TELEPHONE ENCOUNTER
Caller: Kevin Bradshaw    Relationship: Self    Best call back number: [phone]     What test was performed: XRAY    When was the test performed: 1.3.2024    Additional notes: PLEASE CALL AND DISCUSS.    no

## 2024-02-13 RX ORDER — FOLIC ACID 1 MG/1
1000 TABLET ORAL 2 TIMES DAILY
Qty: 60 TABLET | Refills: 5 | Status: SHIPPED | OUTPATIENT
Start: 2024-02-13

## 2024-03-06 ENCOUNTER — HOSPITAL ENCOUNTER (EMERGENCY)
Facility: HOSPITAL | Age: 54
Discharge: HOME | End: 2024-03-06
Attending: EMERGENCY MEDICINE
Payer: COMMERCIAL

## 2024-03-06 ENCOUNTER — APPOINTMENT (OUTPATIENT)
Dept: GENERAL RADIOLOGY | Facility: HOSPITAL | Age: 54
End: 2024-03-06
Payer: COMMERCIAL

## 2024-03-06 VITALS
DIASTOLIC BLOOD PRESSURE: 61 MMHG | SYSTOLIC BLOOD PRESSURE: 120 MMHG | RESPIRATION RATE: 15 BRPM | TEMPERATURE: 98.3 F | HEIGHT: 68 IN | OXYGEN SATURATION: 99 % | WEIGHT: 315 LBS | BODY MASS INDEX: 47.74 KG/M2 | HEART RATE: 60 BPM

## 2024-03-06 DIAGNOSIS — G89.29 CHRONIC LEFT-SIDED LOW BACK PAIN WITHOUT SCIATICA: ICD-10-CM

## 2024-03-06 DIAGNOSIS — M54.6 ACUTE LEFT-SIDED THORACIC BACK PAIN: Primary | ICD-10-CM

## 2024-03-06 DIAGNOSIS — M54.50 CHRONIC LEFT-SIDED LOW BACK PAIN WITHOUT SCIATICA: ICD-10-CM

## 2024-03-06 PROCEDURE — 96372 THER/PROPH/DIAG INJ SC/IM: CPT

## 2024-03-06 PROCEDURE — 25010000002 ORPHENADRINE CITRATE PER 60 MG

## 2024-03-06 PROCEDURE — 72072 X-RAY EXAM THORAC SPINE 3VWS: CPT

## 2024-03-06 PROCEDURE — 97110 THERAPEUTIC EXERCISES: CPT | Performed by: PHYSICAL THERAPIST

## 2024-03-06 PROCEDURE — 97161 PT EVAL LOW COMPLEX 20 MIN: CPT | Performed by: PHYSICAL THERAPIST

## 2024-03-06 PROCEDURE — 25010000002 METHYLPREDNISOLONE PER 80 MG

## 2024-03-06 PROCEDURE — 25010000002 KETOROLAC TROMETHAMINE PER 15 MG

## 2024-03-06 PROCEDURE — 99283 EMERGENCY DEPT VISIT LOW MDM: CPT

## 2024-03-06 RX ORDER — METHYLPREDNISOLONE ACETATE 80 MG/ML
40 INJECTION, SUSPENSION INTRA-ARTICULAR; INTRALESIONAL; INTRAMUSCULAR; SOFT TISSUE ONCE
Status: COMPLETED | OUTPATIENT
Start: 2024-03-06 | End: 2024-03-06

## 2024-03-06 RX ORDER — ORPHENADRINE CITRATE 30 MG/ML
60 INJECTION INTRAMUSCULAR; INTRAVENOUS ONCE
Status: COMPLETED | OUTPATIENT
Start: 2024-03-06 | End: 2024-03-06

## 2024-03-06 RX ORDER — METAXALONE 800 MG/1
800 TABLET ORAL 3 TIMES DAILY PRN
Qty: 15 TABLET | Refills: 0 | Status: SHIPPED | OUTPATIENT
Start: 2024-03-06 | End: 2024-03-11

## 2024-03-06 RX ORDER — KETOROLAC TROMETHAMINE 30 MG/ML
30 INJECTION, SOLUTION INTRAMUSCULAR; INTRAVENOUS ONCE
Status: COMPLETED | OUTPATIENT
Start: 2024-03-06 | End: 2024-03-06

## 2024-03-06 RX ORDER — PREDNISONE 20 MG/1
20 TABLET ORAL DAILY
Qty: 5 TABLET | Refills: 0 | Status: SHIPPED | OUTPATIENT
Start: 2024-03-06 | End: 2024-03-11

## 2024-03-06 RX ADMIN — ORPHENADRINE CITRATE 60 MG: 60 INJECTION INTRAMUSCULAR; INTRAVENOUS at 09:22

## 2024-03-06 RX ADMIN — METHYLPREDNISOLONE ACETATE 40 MG: 80 INJECTION, SUSPENSION INTRA-ARTICULAR; INTRALESIONAL; INTRAMUSCULAR; SOFT TISSUE at 09:14

## 2024-03-06 RX ADMIN — KETOROLAC TROMETHAMINE 30 MG: 30 INJECTION, SOLUTION INTRAMUSCULAR; INTRAVENOUS at 09:20

## 2024-03-06 NOTE — THERAPY EVALUATION
Patient Name: Kevin Bradshaw  : 1970    MRN: [id number]                              Today's Date: 3/6/2024       Admit Date: 3/6/2024    Visit Dx:     ICD-10-CM ICD-9-CM   1. Acute left-sided thoracic back pain  M54.6 724.1     Patient Active Problem List   Diagnosis    Tendinitis of elbow    Chronic gouty arthritis    Degeneration of lumbar intervertebral disc    Essential hypertension    Gastro-esophageal reflux disease without esophagitis    Male hypogonadism    Obstructive sleep apnea    Low back pain    Thoracic back pain    Vitamin D deficiency    History of pulmonary embolus (PE)    Anxiety    Impaired fasting glucose    Polycythemia, secondary    Nausea and vomiting    Other fatigue    Morbid (severe) obesity due to excess calories    Acute pain of right knee     Past Medical History:   Diagnosis Date    Allergies     Anxiety 2021    Degeneration of lumbar intervertebral disc 2016    Fatigue 2023    GERD (gastroesophageal reflux disease)     Gout     High blood pressure     Hypertension     Impaired fasting glucose 03/15/2022    Male hypogonadism 2021    Mid back pain 2016    Obstructive sleep apnea 2021    Polycythemia, secondary 03/15/2022    Related to ETOH abuse     Pulmonary embolism     Tendinitis of elbow 2021    Thoracic back pain     Vitamin D deficiency 2021     Past Surgical History:   Procedure Laterality Date    CHOLECYSTECTOMY      GALLBLADDER SURGERY      HEMORRHOIDECTOMY      LUMBAR EPIDURAL INJECTION  2015    TONSILLECTOMY        General Information       Row Name 24 0801          Physical Therapy Time and Intention    Document Type evaluation  -LR     Mode of Treatment individual therapy  -LR       Row Name 24 0801          General Information    Patient Profile Reviewed yes  -LR     Prior Level of Function independent:  -LR               User Key  (r) = Recorded By, (t) = Taken By, (c) = Cosigned By      Initials Name  Provider Type    Yamile Friedman PT Physical Therapist                  History: Patient reports he has back pain at T10 that is a stabbing pain.  He states when he walks his pain is increased.  Pain is 8/10 currently.  He has not taken any medication or used heat or ice.  Patient is a  for community care.  Patient denies injury.    Objective:    Palpation: Tender to palpation at left paraspinals at T10/11 level    ROM:  Lumbar ROM:  Flexion: WNL  Extension: 25%  L Side bending: WNL and painful  R Side bending: WNL  L Rotation: 75%  R Rotation: 75% and painful    Seated thoracic rotation: 50% bilaterally and painful  Hypomobility through lower thoracic spine with PA mobilizations  Bilateral UE ROM WNL     Strength:  L Hip MMT:   R Hip MMT:  Flexion: 5/5  Flexion: 5/5  Abduction: 5/5  Abduction: 5/5 and painful  Adduction: 5/5  Adduction: 5/5 and painful     L Knee MMT:  R Knee MMT:  Flexion: 5/5  Flexion: 5/5  Extension: 5/5  Extension: 5/5    L Ankle MMT:  R Ankle MMT:  DF: 5/5  DF: 5/5  PF: 5/5   PF: 5/5    Bilateral shoulder strength: 5/5    Special Tests:  Quadrant Test: NT  Slump Test: NT  Straight Leg Raise Test: Positive on left, negative on right  Contralateral Straight Leg Raise Test: NT  Obers Test: NT     Sensation: Bilateral UE and LE sensation intact to light touch    Assessment/Plan:   Pt presents with a diagnosis of back pain and has decreased thoracolumbar rotation and extension ROM that are limiting his ability to walk, push, and pull.  Thoracic mobilizations were performed at T10/11 level.  Patient also performed thoracic and lumbar mobility exercises.  He was provided with a HEP handout.    Goals:   LTG 1: The patient will be independent in HEP in order to decrease pain and improve tolerance to functional activities.  STATUS: Met    Interventions:   Manual Therapy: Prone PA mobilizations and UPA mobilizations at T10 and T11; grade 2-3; good tolerance    Therapeutic Exercises: Prone press  up (4X 20 seconds), bent over cat/cow (10 X), side-lying thoracic rotation stretch, supine thoracic extension over towel roll    Modalities: Not performed     Outcome Measures       Row Name 03/06/24 0801          Optimal Instrument    Optimal Instrument Optimal - 3  -LR     Walking - short distance 2  -LR     Walking - long distance 2  -LR     Climbing stairs 2  -LR     From the list, choose the 3 activities you would most like to be able to do without any difficulty Walking -short distance;Walking -long distance;Climbing stairs  -LR     Total Score Optimal - 3 6  -LR       Row Name 03/06/24 0801          Functional Assessment    Outcome Measure Options Optimal Instrument  -LR               User Key  (r) = Recorded By, (t) = Taken By, (c) = Cosigned By      Initials Name Provider Type    Yamile Friedman, PT Physical Therapist                   Time Calculation:   PT Evaluation Complexity  History, PT Evaluation Complexity: 3 or more personal factors and/or comorbidities  Examination of Body Systems (PT Eval Complexity): 1-2 elements  Clinical Presentation (PT Evaluation Complexity): stable  Clinical Decision Making (PT Evaluation Complexity): low complexity  Overall Complexity (PT Evaluation Complexity): low complexity     PT Charges       Row Name 03/06/24 0802             Time Calculation    PT Received On 03/06/24  -LR         Timed Charges    68072 - PT Therapeutic Exercise Minutes 5  -LR      96708 - PT Manual Therapy Minutes 4  -LR         Untimed Charges    PT Eval/Re-eval Minutes 16  -LR         Total Minutes    Timed Charges Total Minutes 9  -LR      Untimed Charges Total Minutes 16  -LR       Total Minutes 25  -LR                User Key  (r) = Recorded By, (t) = Taken By, (c) = Cosigned By      Initials Name Provider Type    Yamile Friedman, PT Physical Therapist                  Therapy Charges for Today       Code Description Service Date Service Provider Modifiers Qty    28492929384  PT THER  PROC EA 15 MIN 3/6/2024 Yamile Vargas, PT GP 1    85186640095 HC PT EVAL LOW COMPLEXITY 2 3/6/2024 Yamile Vargas, PT GP 1            PT G-Codes  Outcome Measure Options: Optimal Instrument       Yamile Vargas, PT  3/6/2024

## 2024-03-06 NOTE — ED PROVIDER NOTES
Time: 8:06 AM EST  Date of encounter:  3/6/2024  Independent Historian/Clinical History and Information was obtained by:   Patient    History is limited by: N/A    Chief Complaint: Back pain      History of Present Illness:  Patient is a 53 y.o. year old male who presents to the emergency department for evaluation of mid back pain that he reports started yesterday.  Denies any injury.  Patient reports history of similar episodes but pain is worse with this episode.  Denies any numbness or tingling of the extremities, saddle numbness, incontinence of bowel or bladder, fevers, chills, body aches.  Denies any chest pain or shortness of breath, nausea or vomiting.     HPI    Patient Care Team  Primary Care Provider: Jennifer Hobbs APRN    Past Medical History:     No Known Allergies  Past Medical History:   Diagnosis Date    Allergies     Anxiety 08/17/2021    Degeneration of lumbar intervertebral disc 02/05/2016    Fatigue 02/02/2023    GERD (gastroesophageal reflux disease)     Gout     High blood pressure     Hypertension     Impaired fasting glucose 03/15/2022    Male hypogonadism 07/28/2021    Mid back pain 02/24/2016    Obstructive sleep apnea 07/28/2021    Polycythemia, secondary 03/15/2022    Related to ETOH abuse     Pulmonary embolism     Tendinitis of elbow 07/16/2021    Thoracic back pain     Vitamin D deficiency 07/28/2021     Past Surgical History:   Procedure Laterality Date    CHOLECYSTECTOMY      GALLBLADDER SURGERY      HEMORRHOIDECTOMY      LUMBAR EPIDURAL INJECTION  2015    TONSILLECTOMY       Family History   Problem Relation Age of Onset    Hypertension Mother     Hypertension Father        Home Medications:  Prior to Admission medications    Medication Sig Start Date End Date Taking? Authorizing Provider   allopurinol (ZYLOPRIM) 100 MG tablet Take 1 tablet by mouth Daily. 12/20/23   Jennifer Hobbs APRN   apixaban (Eliquis) 5 MG tablet tablet Take 1 tablet by mouth Every 12 (Twelve) Hours.  12/20/23   Jennifer Hobbs APRN   atenolol (TENORMIN) 25 MG tablet Take 1 tablet by mouth 2 (Two) Times a Day. 12/20/23   Jennifer Hobbs APRN   Cholecalciferol (Vitamin D3) 50 MCG (2000 UT) capsule Take 1 capsule by mouth Daily. 4/5/23 4/4/24  Gina Damian MD   cyclobenzaprine (FLEXERIL) 10 MG tablet Take 1 tablet by mouth 2 (Two) Times a Day As Needed for Muscle Spasms. 12/20/23   Jennifer Hobbs APRN   fluticasone (Flonase) 50 MCG/ACT nasal spray 2 sprays into the nostril(s) as directed by provider Daily. 3/29/22   Jennifer Hobbs APRN   folic acid (FOLVITE) 1 MG tablet TAKE 1 TABLET BY MOUTH TWICE A DAY 2/13/24   Jennifer Hobbs APRN   hydroCHLOROthiazide (HYDRODIURIL) 50 MG tablet Take 1 tablet by mouth Daily. 12/20/23   Jennifer Hobbs APRN   lisinopril (PRINIVIL,ZESTRIL) 40 MG tablet Take 1 tablet by mouth Daily. 12/20/23   Jennifer Hobbs APRN   pantoprazole (PROTONIX) 40 MG EC tablet Take 1 tablet by mouth Daily. 12/20/23   Jennifer Hobbs APRN   vitamin D (ERGOCALCIFEROL) 1.25 MG (90850 UT) capsule capsule Take 1 capsule by mouth 1 (One) Time Per Week. Take with a meal. 12/20/23   Jennifer Hobbs APRN        Social History:   Social History     Tobacco Use    Smoking status: Never    Smokeless tobacco: Current     Types: Chew   Vaping Use    Vaping status: Never Used   Substance Use Topics    Alcohol use: Yes     Alcohol/week: 28.0 standard drinks of alcohol     Types: 28 Standard drinks or equivalent per week     Comment: occais    Drug use: Never         Review of Systems:  Review of Systems   Constitutional:  Negative for chills, fatigue and fever.   HENT:  Negative for ear pain, rhinorrhea and sore throat.    Eyes:  Negative for visual disturbance.   Respiratory:  Negative for cough and shortness of breath.    Cardiovascular:  Negative for chest pain.   Gastrointestinal:  Negative for abdominal pain, diarrhea, nausea and vomiting.   Genitourinary:  Negative for  "difficulty urinating, dysuria and hematuria.   Musculoskeletal:  Positive for back pain. Negative for arthralgias, gait problem and myalgias.   Skin:  Negative for rash.   Neurological:  Negative for light-headedness and headaches.   Hematological:  Negative for adenopathy.   Psychiatric/Behavioral: Negative.          Physical Exam:  /61 (BP Location: Left arm, Patient Position: Sitting)   Pulse 60   Temp 98.3 °F (36.8 °C) (Oral)   Resp 15   Ht 172.7 cm (68\")   Wt (!) 145 kg (320 lb 1.7 oz)   SpO2 99%   BMI 48.67 kg/m²     Physical Exam  Vitals and nursing note reviewed.   Constitutional:       General: He is not in acute distress.     Appearance: Normal appearance. He is not toxic-appearing.   HENT:      Head: Normocephalic and atraumatic.      Nose: Nose normal.      Mouth/Throat:      Mouth: Mucous membranes are moist.   Eyes:      Conjunctiva/sclera: Conjunctivae normal.   Cardiovascular:      Rate and Rhythm: Normal rate.      Pulses: Normal pulses.      Heart sounds: Normal heart sounds.   Pulmonary:      Effort: Pulmonary effort is normal.      Breath sounds: Normal breath sounds.   Abdominal:      General: Bowel sounds are normal.      Palpations: Abdomen is soft.      Tenderness: There is no abdominal tenderness.   Musculoskeletal:         General: Tenderness (Left mid back.) present. Normal range of motion.      Cervical back: Normal range of motion. No tenderness.   Skin:     General: Skin is warm and dry.   Neurological:      General: No focal deficit present.      Mental Status: He is alert and oriented to person, place, and time.      Motor: No weakness.      Gait: Gait normal.   Psychiatric:         Mood and Affect: Mood normal.         Behavior: Behavior normal.         Thought Content: Thought content normal.         Judgment: Judgment normal.                  Procedures:  Procedures      Medical Decision Making:      Comorbidities that affect care:    Hypertension    External Notes " reviewed:    None      The following orders were placed and all results were independently analyzed by me:  Orders Placed This Encounter   Procedures    XR Spine Thoracic 3 View    PT Plan of Care Cert / Re-Cert       Medications Given in the Emergency Department:  Medications   methylPREDNISolone acetate (DEPO-medrol) injection 40 mg (40 mg Intramuscular Given 3/6/24 0914)   ketorolac (TORADOL) injection 30 mg (30 mg Intramuscular Given 3/6/24 0920)   orphenadrine (NORFLEX) injection 60 mg (60 mg Intramuscular Given 3/6/24 0922)        ED Course:         Labs:    Lab Results (last 24 hours)       ** No results found for the last 24 hours. **             Imaging:    No Radiology Exams Resulted Within Past 24 Hours      Differential Diagnosis and Discussion:    Back Pain: The patient presents with back pain. My differential diagnosis includes but is not limited to acute spinal epidural abscess, acute spinal epidural bleed, cauda equina syndrome, abdominal aortic aneurysm, aortic dissection, kidney stone, pyelonephritis, musculoskeletal back pain, spinal fracture, and osteoarthritis.     All X-rays impressions were independently interpreted by me.    MDM     Amount and/or Complexity of Data Reviewed  Tests in the radiology section of CPT®: reviewed                 Patient Care Considerations:    NARCOTICS: I considered prescribing opiate pain medication as an outpatient, however pain managed in the ED with NSAIDs and steroid therapy.      Consultants/Shared Management Plan:    None    Social Determinants of Health:    Patient is independent, reliable, and has access to care.       Disposition and Care Coordination:    Discharged: The patient is suitable and stable for discharge with no need for consideration of admission.    I have explained the patient´s condition, diagnoses and treatment plan based on the information available to me at this time. I have answered questions and addressed any concerns. The patient has  a good  understanding of the patient´s diagnosis, condition, and treatment plan as can be expected at this point. The vital signs have been stable. The patient´s condition is stable and appropriate for discharge from the emergency department.      The patient will pursue further outpatient evaluation with the primary care physician or other designated or consulting physician as outlined in the discharge instructions. They are agreeable to this plan of care and follow-up instructions have been explained in detail. The patient has received these instructions in written format and has expressed an understanding of the discharge instructions. The patient is aware that any significant change in condition or worsening of symptoms should prompt an immediate return to this or the closest emergency department or call to 911.  I have explained discharge medications and the need for follow up with the patient/caretakers. This was also printed in the discharge instructions. Patient was discharged with the following medications and follow up:      Medication List        New Prescriptions      metaxalone 800 MG tablet  Commonly known as: SKELAXIN  Take 1 tablet by mouth 3 (Three) Times a Day As Needed for Muscle Spasms for up to 5 days.     predniSONE 20 MG tablet  Commonly known as: DELTASONE  Take 1 tablet by mouth Daily for 5 days.            Stop      cyclobenzaprine 10 MG tablet  Commonly known as: FLEXERIL               Where to Get Your Medications        These medications were sent to [id number] - [place], [place] - [address] [facility] [address] - [phone]  - [phone] FX  [address] [place] [address] [place] [address]      Phone: [phone]   metaxalone 800 MG tablet  predniSONE 20 MG tablet      Jennifer Hobbs, AIDAN  [address]  [address]  [place] [address]  [phone]    Go to   As needed       Final diagnoses:   Chronic left-sided low back pain without sciatica         ED Disposition       ED Disposition   Discharge    Condition   Stable    Comment   --               This medical record created using voice recognition software.             Dasha Stubbs, APRN  03/11/24 1010

## 2024-03-06 NOTE — DISCHARGE INSTRUCTIONS
Please follow-up with your primary care provider as needed.  Return to the ED for worsening back pain, numbness or tingling of extremities, loss of control of bowel or bladder.  Your x-rays were negative at today's ER visit.  You have been prescribed any medications to  at your pharmacy and begin taking today.

## 2024-03-06 NOTE — Clinical Note
[facility] EMERGENCY ROOM  [address]  [place] [address]  Phone: [phone]  Fax: [phone]    Kevin Bradshaw was seen and treated in our emergency department on 3/6/2024.  He may return to work on 03/11/2024.         Thank you for choosing [facility].    Dasha Stubbs, APRN

## 2024-03-06 NOTE — ED TRIAGE NOTES
Pt comes to the ER tonight for chronic back pain. Pt states that he has had problems in the past but has never had it hurt like this before. Pt states that its his muscle in his back and has been told there isn't anything they can do for it.

## 2024-03-08 ENCOUNTER — OFFICE VISIT (OUTPATIENT)
Dept: INTERNAL MEDICINE | Facility: CLINIC | Age: 54
End: 2024-03-08
Payer: COMMERCIAL

## 2024-03-08 VITALS
OXYGEN SATURATION: 97 % | HEIGHT: 68 IN | HEART RATE: 63 BPM | SYSTOLIC BLOOD PRESSURE: 112 MMHG | BODY MASS INDEX: 47.74 KG/M2 | DIASTOLIC BLOOD PRESSURE: 64 MMHG | WEIGHT: 315 LBS | RESPIRATION RATE: 18 BRPM | TEMPERATURE: 97.8 F

## 2024-03-08 DIAGNOSIS — M54.6 CHRONIC LEFT-SIDED THORACIC BACK PAIN: Primary | ICD-10-CM

## 2024-03-08 DIAGNOSIS — G89.29 CHRONIC LEFT-SIDED THORACIC BACK PAIN: Primary | ICD-10-CM

## 2024-03-08 PROCEDURE — 99214 OFFICE O/P EST MOD 30 MIN: CPT

## 2024-03-08 NOTE — PROGRESS NOTES
Chief Complaint  Hospital Follow Up Visit (53 year old male here today for an ER follow up d/t left sided low back pain with sciatica that runs down his left leg. States his leg feels like it is going to give out. He denies any injury. States he is feeling a little bit better since the ER visit. )    History of Present Illness  SUBJECTIVE  Kevin Bradshaw presents to [facility] for an ER follow up for back pain on 3/6/2024.  Patient went to the ED because he was having left sided back pain that radiated to his hip. He did have xrays completed. He was given a Depomedrol injection, toradol 30 mg injection, and norflex 60 mg injection while in the ED and he was discharged home with metaxalone and prednisone.   Thoracic spine xray did show moderate to advanced multilevel degenerative changes.   No known injuries.   Patient states that he is starting to feel better.       Past Medical History:   Diagnosis Date    Allergies     Anxiety 08/17/2021    Degeneration of lumbar intervertebral disc 02/05/2016    Fatigue 02/02/2023    GERD (gastroesophageal reflux disease)     Gout     High blood pressure     Hypertension     Impaired fasting glucose 03/15/2022    Male hypogonadism 07/28/2021    Mid back pain 02/24/2016    Obstructive sleep apnea 07/28/2021    Polycythemia, secondary 03/15/2022    Related to ETOH abuse     Pulmonary embolism     Tendinitis of elbow 07/16/2021    Thoracic back pain     Vitamin D deficiency 07/28/2021      Family History   Problem Relation Age of Onset    Hypertension Mother     Hypertension Father       Past Surgical History:   Procedure Laterality Date    CHOLECYSTECTOMY      GALLBLADDER SURGERY      HEMORRHOIDECTOMY      LUMBAR EPIDURAL INJECTION  2015    TONSILLECTOMY          Current Outpatient Medications:     allopurinol (ZYLOPRIM) 100 MG tablet, Take 1 tablet by mouth Daily., Disp: 30 tablet, Rfl: 5    apixaban (Eliquis) 5 MG tablet tablet, Take 1 tablet by  "mouth Every 12 (Twelve) Hours., Disp: 60 tablet, Rfl: 5    atenolol (TENORMIN) 25 MG tablet, Take 1 tablet by mouth 2 (Two) Times a Day., Disp: 60 tablet, Rfl: 5    Cholecalciferol (Vitamin D3) 50 MCG (2000 UT) capsule, Take 1 capsule by mouth Daily., Disp: 90 capsule, Rfl: 1    fluticasone (Flonase) 50 MCG/ACT nasal spray, 2 sprays into the nostril(s) as directed by provider Daily., Disp: 9.9 g, Rfl: 3    folic acid (FOLVITE) 1 MG tablet, TAKE 1 TABLET BY MOUTH TWICE A DAY, Disp: 60 tablet, Rfl: 5    hydroCHLOROthiazide (HYDRODIURIL) 50 MG tablet, Take 1 tablet by mouth Daily., Disp: 30 tablet, Rfl: 5    lisinopril (PRINIVIL,ZESTRIL) 40 MG tablet, Take 1 tablet by mouth Daily., Disp: 30 tablet, Rfl: 5    metaxalone (SKELAXIN) 800 MG tablet, Take 1 tablet by mouth 3 (Three) Times a Day As Needed for Muscle Spasms for up to 5 days., Disp: 15 tablet, Rfl: 0    pantoprazole (PROTONIX) 40 MG EC tablet, Take 1 tablet by mouth Daily., Disp: 30 tablet, Rfl: 5    predniSONE (DELTASONE) 20 MG tablet, Take 1 tablet by mouth Daily for 5 days., Disp: 5 tablet, Rfl: 0    vitamin D (ERGOCALCIFEROL) 1.25 MG (12833 UT) capsule capsule, Take 1 capsule by mouth 1 (One) Time Per Week. Take with a meal., Disp: 4 capsule, Rfl: 5    OBJECTIVE  Vital Signs:   /64 (BP Location: Left arm, Patient Position: Sitting)   Pulse 63   Temp 97.8 °F (36.6 °C) (Temporal)   Resp 18   Ht 172.7 cm (68\")   Wt (!) 146 kg (321 lb 9.6 oz)   SpO2 97%   BMI 48.90 kg/m²    Estimated body mass index is 48.9 kg/m² as calculated from the following:    Height as of this encounter: 172.7 cm (68\").    Weight as of this encounter: 146 kg (321 lb 9.6 oz).     Wt Readings from Last 3 Encounters:   03/08/24 (!) 146 kg (321 lb 9.6 oz)   03/06/24 (!) 145 kg (320 lb 1.7 oz)   01/09/24 (!) 145 kg (320 lb)     BP Readings from Last 3 Encounters:   03/08/24 112/64   03/06/24 120/61   01/09/24 119/82       Physical Exam  Vitals and nursing note reviewed. "   Constitutional:       Appearance: Normal appearance.   HENT:      Head: Normocephalic.   Eyes:      Extraocular Movements: Extraocular movements intact.      Conjunctiva/sclera: Conjunctivae normal.   Cardiovascular:      Rate and Rhythm: Normal rate and regular rhythm.      Heart sounds: Normal heart sounds. No murmur heard.  Pulmonary:      Effort: Pulmonary effort is normal.      Breath sounds: Normal breath sounds. No wheezing or rales.   Abdominal:      General: Bowel sounds are normal.      Palpations: Abdomen is soft.      Tenderness: There is no abdominal tenderness. There is no guarding.   Musculoskeletal:         General: No swelling.      Thoracic back: Tenderness present. Decreased range of motion.   Skin:     General: Skin is warm and dry.   Neurological:      General: No focal deficit present.      Mental Status: He is alert and oriented to person, place, and time. Mental status is at baseline.   Psychiatric:         Mood and Affect: Mood normal.         Behavior: Behavior normal.         Thought Content: Thought content normal.         Judgment: Judgment normal.          Result Review        XR Spine Thoracic 3 View    Result Date: 3/6/2024    1. Moderate to advanced multilevel degenerative changes similar to prior chest radiograph from 2020     LYNDSAY SALDAÑA MD       Electronically Signed and Approved By: LYNDSAY SALDAÑA MD on 3/06/2024 at 9:11             XR Knee 3 View Right    Result Date: 1/3/2024    1. Mild osteoarthritis with 0.5 cm loose body.      Frederic Hogan M.D.       Electronically Signed and Approved By: Frederic Hogan M.D. on 1/03/2024 at 14:54                The above data has been reviewed by AIDAN Ruiz 03/08/2024 09:11 EST.          Patient Care Team:  Jennifer Hobbs APRN as PCP - General (Nurse Practitioner)            ASSESSMENT & PLAN    Diagnoses and all orders for this visit:    1. Chronic left-sided thoracic back pain (Primary)  Assessment & Plan:  Patient went  to the ED because he was having left sided back pain that radiated to his hip. He did have xrays completed. He was given a Depomedrol injection, toradol 30 mg injection, and norflex 60 mg injection while in the ED and he was discharged home with metaxalone and prednisone.   Thoracic spine xray did show moderate to advanced multilevel degenerative changes.   No known injuries.   Patient states that he is starting to feel better.   He may continue current medication regimen as prescribed by ED. He may add in tylenol PRN.  Patient reports he has bulging discs in his L spine.  Declines PT  We will get further imaging and referral to neurosurgery.   Recommend ROM exercises.    Orders:  -     MRI Thoracic Spine With & Without Contrast; Future  -     Ambulatory Referral to Neurosurgery         Tobacco Use: High Risk (3/8/2024)    Patient History     Smoking Tobacco Use: Never     Smokeless Tobacco Use: Current     Passive Exposure: Not on file       Follow Up     Return if symptoms worsen or fail to improve.    Please note that portions of this note were completed with a voice recognition program.    Patient was given instructions and counseling regarding his condition or for health maintenance advice. Please see specific information pulled into the AVS if appropriate.   I have reviewed information obtained and documented by others and I have confirmed the accuracy of this documented note.    AIDAN Ruiz

## 2024-03-08 NOTE — ASSESSMENT & PLAN NOTE
Patient went to the ED because he was having left sided back pain that radiated to his hip. He did have xrays completed. He was given a Depomedrol injection, toradol 30 mg injection, and norflex 60 mg injection while in the ED and he was discharged home with metaxalone and prednisone.   Thoracic spine xray did show moderate to advanced multilevel degenerative changes.   No known injuries.   Patient states that he is starting to feel better.   He may continue current medication regimen as prescribed by ED. He may add in tylenol PRN.  Patient reports he has bulging discs in his L spine.  Declines PT  We will get further imaging and referral to neurosurgery.   Recommend ROM exercises.

## 2024-04-15 ENCOUNTER — HOSPITAL ENCOUNTER (OUTPATIENT)
Dept: MRI IMAGING | Facility: HOSPITAL | Age: 54
Discharge: HOME | End: 2024-04-15
Payer: COMMERCIAL

## 2024-04-15 DIAGNOSIS — G89.29 CHRONIC LEFT-SIDED THORACIC BACK PAIN: ICD-10-CM

## 2024-04-15 DIAGNOSIS — M54.6 CHRONIC LEFT-SIDED THORACIC BACK PAIN: ICD-10-CM

## 2024-04-15 PROCEDURE — 72146 MRI CHEST SPINE W/O DYE: CPT

## 2024-05-09 ENCOUNTER — OFFICE VISIT (OUTPATIENT)
Dept: NEUROSURGERY | Facility: CLINIC | Age: 54
End: 2024-05-09
Payer: MEDICAID

## 2024-05-09 VITALS — HEIGHT: 68 IN | WEIGHT: 315 LBS | BODY MASS INDEX: 47.74 KG/M2

## 2024-05-09 DIAGNOSIS — G89.29 CHRONIC LEFT-SIDED THORACIC BACK PAIN: Primary | ICD-10-CM

## 2024-05-09 DIAGNOSIS — M54.6 CHRONIC LEFT-SIDED THORACIC BACK PAIN: Primary | ICD-10-CM

## 2024-05-09 PROCEDURE — 1159F MED LIST DOCD IN RCRD: CPT | Performed by: NEUROLOGICAL SURGERY

## 2024-05-09 PROCEDURE — 99203 OFFICE O/P NEW LOW 30 MIN: CPT | Performed by: NEUROLOGICAL SURGERY

## 2024-05-09 PROCEDURE — 1160F RVW MEDS BY RX/DR IN RCRD: CPT | Performed by: NEUROLOGICAL SURGERY

## 2024-06-08 ENCOUNTER — APPOINTMENT (OUTPATIENT)
Dept: GENERAL RADIOLOGY | Facility: HOSPITAL | Age: 54
End: 2024-06-08
Payer: COMMERCIAL

## 2024-06-08 ENCOUNTER — HOSPITAL ENCOUNTER (EMERGENCY)
Facility: HOSPITAL | Age: 54
Discharge: HOME | End: 2024-06-08
Attending: EMERGENCY MEDICINE | Admitting: EMERGENCY MEDICINE
Payer: COMMERCIAL

## 2024-06-08 VITALS
WEIGHT: 315 LBS | DIASTOLIC BLOOD PRESSURE: 67 MMHG | SYSTOLIC BLOOD PRESSURE: 135 MMHG | OXYGEN SATURATION: 97 % | BODY MASS INDEX: 47.74 KG/M2 | HEART RATE: 71 BPM | RESPIRATION RATE: 17 BRPM | HEIGHT: 68 IN | TEMPERATURE: 98.6 F

## 2024-06-08 DIAGNOSIS — M77.11 LATERAL EPICONDYLITIS OF RIGHT ELBOW: ICD-10-CM

## 2024-06-08 DIAGNOSIS — M77.01 MEDIAL EPICONDYLITIS OF ELBOW, RIGHT: Primary | ICD-10-CM

## 2024-06-08 PROCEDURE — 73080 X-RAY EXAM OF ELBOW: CPT

## 2024-06-08 PROCEDURE — 99283 EMERGENCY DEPT VISIT LOW MDM: CPT

## 2024-06-08 PROCEDURE — 96372 THER/PROPH/DIAG INJ SC/IM: CPT

## 2024-06-08 PROCEDURE — 25010000002 KETOROLAC TROMETHAMINE PER 15 MG

## 2024-06-08 RX ORDER — KETOROLAC TROMETHAMINE 30 MG/ML
30 INJECTION, SOLUTION INTRAMUSCULAR; INTRAVENOUS ONCE
Status: COMPLETED | OUTPATIENT
Start: 2024-06-08 | End: 2024-06-08

## 2024-06-08 RX ADMIN — KETOROLAC TROMETHAMINE 30 MG: 30 INJECTION, SOLUTION INTRAMUSCULAR; INTRAVENOUS at 07:46

## 2024-06-08 NOTE — DISCHARGE INSTRUCTIONS
Home to rest.  Avoid overuse of your right elbow until symptoms resolve.  Apply ice to affected area for 15 to 20 minutes after activity and hourly while awake.  You may take Tylenol for your pain.    Call your primary care provider for follow-up and further management and possible referral to physical therapy.  You may also call your specialist at [facility] for follow-up.    If symptoms worsen or change in presentation return to the ED

## 2024-06-08 NOTE — ED PROVIDER NOTES
Time: 7:21 AM EDT  Date of encounter:  6/8/2024  Independent Historian/Clinical History and Information was obtained by:   Patient    History is limited by: N/A    Chief Complaint   Patient presents with    Elbow Pain         History of Present Illness:  Patient is a 53 y.o. year old male who presents to the emergency department for evaluation of right elbow pain.  Patient states he did not have any pain before to bed last night, then the pain woke him up this morning and now he is unable to use his right arm.  Patient reports a history of right elbow medial epicondylitis approximately 3 to 4 years ago for which he did therapy and has not had any further issues.  Denies new injury.    Patient Care Team  Primary Care Provider: Jennifer Hobbs APRN    Past Medical History:     Allergies   Allergen Reactions    Other GI Intolerance     Onions, peppers     Past Medical History:   Diagnosis Date    Allergies     Anxiety 08/17/2021    Degeneration of lumbar intervertebral disc 02/05/2016    Fatigue 02/02/2023    GERD (gastroesophageal reflux disease)     Gout     High blood pressure     Hypertension     Impaired fasting glucose 03/15/2022    Male hypogonadism 07/28/2021    Mid back pain 02/24/2016    Obstructive sleep apnea 07/28/2021    Polycythemia, secondary 03/15/2022    Related to ETOH abuse     Pulmonary embolism     Tendinitis of elbow 07/16/2021    Thoracic back pain     Vitamin D deficiency 07/28/2021     Past Surgical History:   Procedure Laterality Date    CHOLECYSTECTOMY      GALLBLADDER SURGERY      HEMORRHOIDECTOMY      LUMBAR EPIDURAL INJECTION  2015    TONSILLECTOMY       Family History   Problem Relation Age of Onset    Hypertension Mother     Hypertension Father        Home Medications:  Prior to Admission medications    Medication Sig Start Date End Date Taking? Authorizing Provider   allopurinol (ZYLOPRIM) 100 MG tablet Take 1 tablet by mouth Daily. 12/20/23   Jennifer Hobbs APRN   apixaban  (Eliquis) 5 MG tablet tablet Take 1 tablet by mouth Every 12 (Twelve) Hours. 12/20/23   Jennifer Hobbs APRN   atenolol (TENORMIN) 25 MG tablet Take 1 tablet by mouth 2 (Two) Times a Day. 12/20/23   Jennifer Hobbs APRN   cyclobenzaprine (FLEXERIL) 10 MG tablet  4/1/24   Provider, MD Abbie   fluticasone (Flonase) 50 MCG/ACT nasal spray 2 sprays into the nostril(s) as directed by provider Daily. 3/29/22   Jennifer Hobbs APRN   folic acid (FOLVITE) 1 MG tablet TAKE 1 TABLET BY MOUTH TWICE A DAY 2/13/24   Jennifer Hobbs APRN   hydroCHLOROthiazide (HYDRODIURIL) 50 MG tablet Take 1 tablet by mouth Daily. 12/20/23   Jennifer Hobbs APRN   lisinopril (PRINIVIL,ZESTRIL) 40 MG tablet Take 1 tablet by mouth Daily. 12/20/23   Jennifer Hobbs APRN   methylPREDNISolone (MEDROL) 4 MG dose pack Take as directed on package instructions. 5/20/24   Josh Waggoner PA-C   pantoprazole (PROTONIX) 40 MG EC tablet Take 1 tablet by mouth Daily. 12/20/23   Jennifer Hobbs APRN   vitamin D (ERGOCALCIFEROL) 1.25 MG (41213 UT) capsule capsule Take 1 capsule by mouth 1 (One) Time Per Week. Take with a meal. 12/20/23   Jennifer Hobbs APRN        Social History:   Social History     Tobacco Use    Smoking status: Never     Passive exposure: Never    Smokeless tobacco: Current     Types: Chew   Vaping Use    Vaping status: Never Used   Substance Use Topics    Alcohol use: Yes     Alcohol/week: 28.0 standard drinks of alcohol     Types: 28 Standard drinks or equivalent per week     Comment: occais    Drug use: Never         Review of Systems:  Review of Systems   Constitutional: Negative.    HENT: Negative.     Eyes: Negative.    Respiratory: Negative.     Cardiovascular: Negative.    Gastrointestinal: Negative.    Endocrine: Negative.    Genitourinary: Negative.    Musculoskeletal:  Positive for joint swelling and myalgias.   Skin: Negative.    Allergic/Immunologic: Negative.    Neurological: Negative.   "  Hematological: Negative.    Psychiatric/Behavioral: Negative.          Physical Exam:  /67 (BP Location: Left arm, Patient Position: Sitting)   Pulse 71   Temp 98.6 °F (37 °C) (Oral)   Resp 17   Ht 172.7 cm (68\")   Wt (!) 150 kg (330 lb 4 oz)   SpO2 97%   BMI 50.21 kg/m²         Physical Exam  Vitals and nursing note reviewed.   Constitutional:       General: He is not in acute distress.     Appearance: Normal appearance. He is not toxic-appearing.   HENT:      Head: Normocephalic and atraumatic.      Jaw: There is normal jaw occlusion.   Eyes:      General: Lids are normal.      Extraocular Movements: Extraocular movements intact.      Conjunctiva/sclera: Conjunctivae normal.      Pupils: Pupils are equal, round, and reactive to light.   Cardiovascular:      Rate and Rhythm: Normal rate and regular rhythm.      Pulses: Normal pulses.      Heart sounds: Normal heart sounds.   Pulmonary:      Effort: Pulmonary effort is normal. No respiratory distress.      Breath sounds: Normal breath sounds. No wheezing or rhonchi.   Abdominal:      General: Abdomen is flat. Bowel sounds are normal.      Palpations: Abdomen is soft.      Tenderness: There is no abdominal tenderness. There is no guarding or rebound.   Musculoskeletal:         General: Normal range of motion.      Right elbow: Swelling present. Decreased range of motion. Tenderness present in medial epicondyle and lateral epicondyle.      Cervical back: Normal range of motion and neck supple.      Right lower leg: No edema.      Left lower leg: No edema.      Comments: Pain and tenderness to the medial epicondyle, lateral epicondyle, and olecranon process with palpation.  Warmth noted only to the lateral elbow. Decreased range of motion due to pain.  Positive Rochester's and Owen test.  No ecchymosis appreciated.   Skin:     General: Skin is warm and dry.   Neurological:      Mental Status: He is alert and oriented to person, place, and time. Mental status " is at baseline.   Psychiatric:         Mood and Affect: Mood normal.                  Procedures:  Procedures      Medical Decision Making:      Comorbidities that affect care:    Gout, allergies, PE, hypertension, GERD, anxiety, tendinitis of elbow    External Notes reviewed:    Previous Clinic Note: Patient was seen on 5/20/2024 at urgent care for evaluation of right earache.      The following orders were placed and all results were independently analyzed by me:  Orders Placed This Encounter   Procedures    XR Elbow 3+ View Right       Medications Given in the Emergency Department:  Medications   ketorolac (TORADOL) injection 30 mg (30 mg Intramuscular Given 6/8/24 0746)        ED Course:    The patient was initially evaluated in the triage area where orders were placed. The patient was later dispositioned by AIDAN Miramontes.      The patient was advised to stay for completion of workup which includes but is not limited to communication of labs and radiological results, reassessment and plan. The patient was advised that leaving prior to disposition by a provider could result in critical findings that are not communicated to the patient.          Labs:    Lab Results (last 24 hours)       ** No results found for the last 24 hours. **             Imaging:    XR Elbow 3+ View Right    Result Date: 6/8/2024  XR ELBOW 3+ VW RIGHT Date of Exam: 6/8/2024 7:40 AM EDT Indication: pain Comparison: MRI elbow April 23, 2021 Findings: There is a spur off what looks like the coronoid process of the proximal ulna with a lucency at the base that may reflect sequela of an old fracture of a spur. The visualized distal humerus and proximal radius look intact. The exam is somewhat limited due to positioning.     Impression: 1.Exam somewhat limited due to positioning. 2.Suggested spur off the olecranon process of the proximal ulna with what could reflect fracture at the base. The osseous structures in this area appear corticated  suggesting this could be more chronic. Electronically Signed: Galo Ortiz MD  6/8/2024 7:57 AM EDT  Workstation ID: [id number]       Differential Diagnosis and Discussion:      Extremity Pain: Differential diagnosis includes but is not limited to soft tissue sprain, tendonitis, tendon injury, dislocation, fracture, deep vein thrombosis, arterial insufficiency, osteoarthritis, bursitis, and ligamentous damage.    All X-rays impressions were independently interpreted by me.    MDM     Amount and/or Complexity of Data Reviewed  Tests in the radiology section of CPT®: reviewed         Patient Care Considerations:    NARCOTICS: I considered prescribing opiate pain medication as an outpatient, however symptoms were improved with NSAID treatment in the ED and patient prefers to continue OTC medication management.      Consultants/Shared Management Plan:    None    Social Determinants of Health:    Patient is independent, reliable, and has access to care.       Disposition and Care Coordination:    Discharged: The patient is suitable and stable for discharge with no need for consideration of admission.    I have explained the patient´s condition, diagnoses and treatment plan based on the information available to me at this time. I have answered questions and addressed any concerns. The patient has a good  understanding of the patient´s diagnosis, condition, and treatment plan as can be expected at this point. The vital signs have been stable. The patient´s condition is stable and appropriate for discharge from the emergency department.  Patient is instructed to follow-up with his primary care provider to obtain possible referral to physical therapy or call his hand specialist at [facility] for follow-up and physical therapy.  Patient is agreeable to discharge instructions.     The patient will pursue further outpatient evaluation with the primary care physician or other designated or consulting physician as  outlined in the discharge instructions. They are agreeable to this plan of care and follow-up instructions have been explained in detail. The patient has received these instructions in written format and has expressed an understanding of the discharge instructions. The patient is aware that any significant change in condition or worsening of symptoms should prompt an immediate return to this or the closest emergency department or call to 911.    Final diagnoses:   Medial epicondylitis of elbow, right   Lateral epicondylitis of right elbow        ED Disposition       ED Disposition   Discharge    Condition   Stable    Comment   --               This medical record created using voice recognition software.             Criss Bautista, APRAZAR  06/08/24 1145

## 2024-06-10 ENCOUNTER — OFFICE VISIT (OUTPATIENT)
Dept: INTERNAL MEDICINE | Age: 54
End: 2024-06-10
Payer: COMMERCIAL

## 2024-06-10 VITALS
DIASTOLIC BLOOD PRESSURE: 70 MMHG | TEMPERATURE: 97.5 F | BODY MASS INDEX: 47.74 KG/M2 | WEIGHT: 315 LBS | SYSTOLIC BLOOD PRESSURE: 100 MMHG | HEIGHT: 68 IN | HEART RATE: 73 BPM | OXYGEN SATURATION: 96 %

## 2024-06-10 DIAGNOSIS — M77.11 LATERAL EPICONDYLITIS OF RIGHT ELBOW: Primary | ICD-10-CM

## 2024-06-10 PROCEDURE — 96372 THER/PROPH/DIAG INJ SC/IM: CPT | Performed by: NURSE PRACTITIONER

## 2024-06-10 PROCEDURE — 99214 OFFICE O/P EST MOD 30 MIN: CPT | Performed by: NURSE PRACTITIONER

## 2024-06-10 PROCEDURE — 20550 NJX 1 TENDON SHEATH/LIGAMENT: CPT | Performed by: NURSE PRACTITIONER

## 2024-06-10 RX ORDER — METHYLPREDNISOLONE ACETATE 40 MG/ML
120 INJECTION, SUSPENSION INTRA-ARTICULAR; INTRALESIONAL; INTRAMUSCULAR; SOFT TISSUE
Status: COMPLETED | OUTPATIENT
Start: 2024-06-10 | End: 2024-06-10

## 2024-06-10 RX ORDER — KETOROLAC TROMETHAMINE 30 MG/ML
30 INJECTION, SOLUTION INTRAMUSCULAR; INTRAVENOUS ONCE
Status: COMPLETED | OUTPATIENT
Start: 2024-06-10 | End: 2024-06-10

## 2024-06-10 RX ORDER — LIDOCAINE HYDROCHLORIDE 10 MG/ML
1 INJECTION, SOLUTION INFILTRATION; PERINEURAL
Status: COMPLETED | OUTPATIENT
Start: 2024-06-10 | End: 2024-06-10

## 2024-06-10 RX ADMIN — METHYLPREDNISOLONE ACETATE 120 MG: 40 INJECTION, SUSPENSION INTRA-ARTICULAR; INTRALESIONAL; INTRAMUSCULAR; SOFT TISSUE at 10:34

## 2024-06-10 RX ADMIN — LIDOCAINE HYDROCHLORIDE 1 ML: 10 INJECTION, SOLUTION INFILTRATION; PERINEURAL at 10:34

## 2024-06-10 RX ADMIN — KETOROLAC TROMETHAMINE 30 MG: 30 INJECTION, SOLUTION INTRAMUSCULAR; INTRAVENOUS at 10:41

## 2024-06-10 NOTE — PROGRESS NOTES
Chief Complaint  Elbow Injury (The patient is coming in for some right elbow pain. The patient is having some right elbow pain, the area is swollen. The patient states he didn't injure his arm, he just woke up in pain. He states he has a previous injury 4 years ago to the same elbow. He still has some ROM. The patient was given a shot in the ER and was told to ice the area.)  Subjective    History of Present Illness  Kevin Bradshaw is a 53 y.o. male  presents to [facility] for follow-up from emergency room visits on 6/8/2024 for right elbow pain.  The patient reported that he woke up with the pain.  Denies recent injury.  Patient reports a history of right elbow medial epicondylitis approximately 3 to 4 years ago for which he did therapy and has not had any further issues.  He was given Toradol 30 mg and told to follow-up. He has been using ice for 15 minutes several times a day and wearing a compression devise. He has improved some but still has decreased range of motion.  Patient is right-hand dominant.    Past Medical History:   Diagnosis Date   • Allergies    • Anxiety 08/17/2021   • Degeneration of lumbar intervertebral disc 02/05/2016   • Fatigue 02/02/2023   • GERD (gastroesophageal reflux disease)    • Gout    • High blood pressure    • Hypertension    • Impaired fasting glucose 03/15/2022   • Male hypogonadism 07/28/2021   • Mid back pain 02/24/2016   • Obstructive sleep apnea 07/28/2021   • Polycythemia, secondary 03/15/2022    Related to ETOH abuse    • Pulmonary embolism    • Tendinitis of elbow 07/16/2021   • Thoracic back pain    • Vitamin D deficiency 07/28/2021        Past Surgical History:   Procedure Laterality Date   • CHOLECYSTECTOMY     • GALLBLADDER SURGERY     • HEMORRHOIDECTOMY     • LUMBAR EPIDURAL INJECTION  2015   • TONSILLECTOMY          Allergies   Allergen Reactions   • Other GI Intolerance     Onions, peppers          Current Outpatient Medications:  "  •  allopurinol (ZYLOPRIM) 100 MG tablet, Take 1 tablet by mouth Daily., Disp: 30 tablet, Rfl: 5  •  apixaban (Eliquis) 5 MG tablet tablet, Take 1 tablet by mouth Every 12 (Twelve) Hours., Disp: 60 tablet, Rfl: 5  •  atenolol (TENORMIN) 25 MG tablet, Take 1 tablet by mouth 2 (Two) Times a Day., Disp: 60 tablet, Rfl: 5  •  cyclobenzaprine (FLEXERIL) 10 MG tablet, , Disp: , Rfl:   •  fluticasone (Flonase) 50 MCG/ACT nasal spray, 2 sprays into the nostril(s) as directed by provider Daily., Disp: 9.9 g, Rfl: 3  •  folic acid (FOLVITE) 1 MG tablet, TAKE 1 TABLET BY MOUTH TWICE A DAY, Disp: 60 tablet, Rfl: 5  •  hydroCHLOROthiazide (HYDRODIURIL) 50 MG tablet, Take 1 tablet by mouth Daily., Disp: 30 tablet, Rfl: 5  •  lisinopril (PRINIVIL,ZESTRIL) 40 MG tablet, Take 1 tablet by mouth Daily., Disp: 30 tablet, Rfl: 5  •  pantoprazole (PROTONIX) 40 MG EC tablet, Take 1 tablet by mouth Daily., Disp: 30 tablet, Rfl: 5  •  vitamin D (ERGOCALCIFEROL) 1.25 MG (87489 UT) capsule capsule, Take 1 capsule by mouth 1 (One) Time Per Week. Take with a meal., Disp: 4 capsule, Rfl: 5  No current facility-administered medications for this visit.    Objective   /70 (BP Location: Left arm, Patient Position: Sitting, Cuff Size: Large Adult)   Pulse 73   Temp 97.5 °F (36.4 °C) (Temporal)   Ht 172.7 cm (68\")   Wt (!) 148 kg (326 lb 9.6 oz)   SpO2 96%   BMI 49.66 kg/m²    Estimated body mass index is 49.66 kg/m² as calculated from the following:    Height as of this encounter: 172.7 cm (68\").    Weight as of this encounter: 148 kg (326 lb 9.6 oz).   Physical Exam  Vitals reviewed.   Constitutional:       General: He is not in acute distress.  HENT:      Head: Normocephalic and atraumatic.   Pulmonary:      Effort: Pulmonary effort is normal.   Musculoskeletal:      Right elbow: Swelling present. Decreased range of motion. Tenderness present in lateral epicondyle.   Neurological:      General: No focal deficit present.      Mental " Status: He is alert.   Psychiatric:         Thought Content: Thought content normal.      Result Review :  The following data was reviewed by: AIDAN Finnegan on 06/10/2024:    Data reviewed : ED Provider Notes by Criss Bautista APRN (06/08/2024 07:21)    - Injection Tendon or Ligament    Date/Time: 6/10/2024 10:34 AM    Performed by: Jennifer Hobbs APRN  Authorized by: Jennifer Hobbs APRN  Preparation: Patient was prepped and draped in the usual sterile fashion.  Local anesthesia used: yes    Anesthesia:  Local anesthesia used: yes  Local Anesthetic: topical anesthetic    Sedation:  Patient sedated: no    Patient tolerance: patient tolerated the procedure well with no immediate complications  Comments: Risk and benefits discussed. After a timeout and signed consent,  under aseptic conditions, I injected 1 mL of 40 mg/mL Depo-Medrol and 1 mL of 1% lidocaine into the right elbow at the point of maximum tenderness of the lateral epicondyle.    Medications administered: 1 mL lidocaine 1 %; 120 mg methylPREDNISolone acetate 40 MG/ML       XR Elbow 3+ View Right (06/08/2024 07:46)          Assessment and Plan   Diagnoses and all orders for this visit:    1. Lateral epicondylitis of right elbow (Primary)  -     Ambulatory Referral to Physical Therapy  -     - Injection Tendon or Ligament  -     ketorolac (TORADOL) injection 30 mg      Toradol 60 mg IM today.  Right elbow injection; see procedure note.  Post procedure instructions: Avoid excess activity for 24 to 48 hours, gradual return to full activity, ice 3 times per day for 3 days.  Start physical therapy in approximately 2 weeks.  The patient already [facility] to [facility] and will take order to them.  If he is not improving after rehab advised to see orthopedic specialist.    Patient was given instructions and counseling regarding his condition or for health maintenance advice. Please see specific information pulled into the AVS if appropriate.     Follow  Up   Return for or if symptoms worsen or fail to improve.    Dictated Utilizing Dragon Dictation.  Please note that portions of this note were completed with a voice recognition program.  Part of this note may be an electronic transcription/translation of spoken language to printed text using the Dragon Dictation System.    AIDAN Finnegan

## 2024-06-27 DIAGNOSIS — K21.9 GASTRO-ESOPHAGEAL REFLUX DISEASE WITHOUT ESOPHAGITIS: ICD-10-CM

## 2024-06-27 RX ORDER — PANTOPRAZOLE SODIUM 40 MG/1
40 TABLET, DELAYED RELEASE ORAL DAILY
Qty: 90 TABLET | Refills: 1 | Status: SHIPPED | OUTPATIENT
Start: 2024-06-27

## 2024-07-10 RX ORDER — LISINOPRIL 40 MG/1
40 TABLET ORAL DAILY
Qty: 30 TABLET | Refills: 5 | Status: SHIPPED | OUTPATIENT
Start: 2024-07-10

## 2024-07-10 RX ORDER — HYDROCHLOROTHIAZIDE 50 MG/1
50 TABLET ORAL DAILY
Qty: 30 TABLET | Refills: 5 | Status: SHIPPED | OUTPATIENT
Start: 2024-07-10

## 2024-07-10 NOTE — TELEPHONE ENCOUNTER
Rx Refill Note  Requested Prescriptions     Pending Prescriptions Disp Refills    hydroCHLOROthiazide 50 MG tablet [Pharmacy Med Name: hydroCHLOROthiazide 50 MG TAB] 30 tablet 5     Sig: TAKE 1 TABLET BY MOUTH DAILY     Signed Prescriptions Disp Refills    lisinopril (PRINIVIL,ZESTRIL) 40 MG tablet 30 tablet 5     Sig: TAKE 1 TABLET BY MOUTH DAILY     Authorizing Provider: MIKE INMAN     Ordering User: LYNETTE GAMA      Last office visit with prescribing clinician: 6/10/2024   Last telemedicine visit with prescribing clinician: Visit date not found   Next office visit with prescribing clinician: Visit date not found                         Would you like a call back once the refill request has been completed: [] Yes [] No    If the office needs to give you a call back, can they leave a voicemail: [] Yes [] No    Lynette Gama MA  07/10/24, 08:37 EDT

## 2024-07-12 RX ORDER — ALLOPURINOL 100 MG/1
100 TABLET ORAL DAILY
Qty: 30 TABLET | Refills: 5 | Status: SHIPPED | OUTPATIENT
Start: 2024-07-12

## 2024-07-12 RX ORDER — APIXABAN 5 MG/1
5 TABLET, FILM COATED ORAL EVERY 12 HOURS
Qty: 60 TABLET | Refills: 5 | Status: SHIPPED | OUTPATIENT
Start: 2024-07-12

## 2024-08-06 RX ORDER — CYCLOBENZAPRINE HCL 10 MG
10 TABLET ORAL 2 TIMES DAILY PRN
Qty: 30 TABLET | Refills: 1 | Status: SHIPPED | OUTPATIENT
Start: 2024-08-06

## 2024-08-12 RX ORDER — FOLIC ACID 1 MG/1
1000 TABLET ORAL 2 TIMES DAILY
Qty: 180 TABLET | OUTPATIENT
Start: 2024-08-12

## 2024-08-20 RX ORDER — FOLIC ACID 1 MG/1
1000 TABLET ORAL 2 TIMES DAILY
Qty: 180 TABLET | OUTPATIENT
Start: 2024-08-20

## 2024-08-21 RX ORDER — FOLIC ACID 1 MG/1
1000 TABLET ORAL 2 TIMES DAILY
Qty: 60 TABLET | Refills: 5 | OUTPATIENT
Start: 2024-08-21

## 2024-08-21 NOTE — TELEPHONE ENCOUNTER
Caller: Kevin Bradshaw    Relationship: Self    Best call back number: [phone]    Requested Prescriptions:   Requested Prescriptions     Pending Prescriptions Disp Refills    folic acid (FOLVITE) 1 MG tablet 60 tablet 5     Sig: Take 1 tablet by mouth 2 (Two) Times a Day.        Pharmacy where request should be sent: [facility] [phone] [place], [place] - [address] AT [facility] [address] & [facility] [phone] [facility] [phone] FX     Last office visit with prescribing clinician: 6/10/2024   Last telemedicine visit with prescribing clinician: Visit date not found   Next office visit with prescribing clinician: Visit date not found     Additional details provided by patient: PATIENT HAS BEEN OUT OF THIS MEDICATION FOR 3 DAYS AND WOULD LIKE CALL WITH STATUS OF MED REFILL.     Does the patient have less than a 3 day supply:  [x] Yes  [] No    Would you like a call back once the refill request has been completed: [x] Yes [] No    If the office needs to give you a call back, can they leave a voicemail: [x] Yes [] No    Toby Hoffman Rep   08/21/24 16:30 EDT

## 2024-08-21 NOTE — TELEPHONE ENCOUNTER
I have let pt know of message from last refill encounter and he said because of work he wont be able to get these done till around labor day weekend. He wants to know if this will hurt him not to take for that long?

## 2024-09-03 ENCOUNTER — OFFICE VISIT (OUTPATIENT)
Dept: ORTHOPEDIC SURGERY | Facility: CLINIC | Age: 54
End: 2024-09-03
Payer: COMMERCIAL

## 2024-09-03 VITALS
OXYGEN SATURATION: 94 % | DIASTOLIC BLOOD PRESSURE: 84 MMHG | WEIGHT: 315 LBS | HEIGHT: 68 IN | HEART RATE: 74 BPM | BODY MASS INDEX: 47.74 KG/M2 | SYSTOLIC BLOOD PRESSURE: 129 MMHG

## 2024-09-03 DIAGNOSIS — E66.01 OBESITY, MORBID, BMI 40.0-49.9: ICD-10-CM

## 2024-09-03 DIAGNOSIS — M77.11 LATERAL EPICONDYLITIS OF RIGHT ELBOW: Primary | ICD-10-CM

## 2024-09-03 RX ADMIN — TRIAMCINOLONE ACETONIDE 40 MG: 40 INJECTION, SUSPENSION INTRA-ARTICULAR; INTRAMUSCULAR at 11:25

## 2024-09-03 RX ADMIN — LIDOCAINE HYDROCHLORIDE 1 ML: 10 INJECTION, SOLUTION INFILTRATION; PERINEURAL at 11:25

## 2024-09-03 NOTE — PROGRESS NOTES
"Chief Complaint  Initial Evaluation and Pain of the Right Elbow     Home Bradshaw presents to [facility] for an evaluation  of his right elbow. He has been having tennis elbow symptoms that has gotten worse over the last few weeks. He locates his pain to the lateral epicondyle. He has been wearing a brace without much relief. He had an IM injection done by urgent care without any improvement.     Allergies   Allergen Reactions    Other GI Intolerance     Onions, peppers        Social History     Socioeconomic History    Marital status:    Tobacco Use    Smoking status: Never     Passive exposure: Never    Smokeless tobacco: Current     Types: Chew   Vaping Use    Vaping status: Never Used   Substance and Sexual Activity    Alcohol use: Yes     Alcohol/week: 28.0 standard drinks of alcohol     Types: 28 Standard drinks or equivalent per week     Comment: occais    Drug use: Never    Sexual activity: Defer        I reviewed the patient's chief complaint, history of present illness, review of systems, past medical history, surgical history, family history, social history, medications, and allergy list.     Review of Systems     Constitutional: Denies fevers, chills, weight loss  Cardiovascular: Denies chest pain, shortness of breath  Skin: Denies rashes, acute skin changes  Neurologic: Denies headache, loss of consciousness  MSK: Right elbow pain       Vital Signs:   /84   Pulse 74   Ht 172.7 cm (68\")   Wt (!) 145 kg (320 lb)   SpO2 94%   BMI 48.66 kg/m²          Physical Exam  General: Alert. No acute distress    Ortho Exam      Right upper extremity: tender to the lateral epicondyle, pain with resisted wrist flexion and extension, neurovascularly intact, sensation intact, posterior pulses, full elbow range of motion     Medium Joint Arthrocentesis: R elbow  Date/Time: 9/3/2024 11:25 AM  Supporting Documentation  Indications: pain   Procedure " Details  Location: elbow - R elbow  Needle size: 23 G  Medications administered: 1 mL lidocaine 1 %; 40 mg triamcinolone acetonide 40 MG/ML  Patient tolerance: patient tolerated the procedure well with no immediate complications        This injection documentation was Scribed for Cm Rosenthal MD by Anand Angeles.  09/03/24   11:26 EDT      Imaging Results (Most Recent)       None             Result Review :         No results found.           Assessment and Plan     Diagnoses and all orders for this visit:    1. Lateral epicondylitis of right elbow (Primary)  -     Ambulatory Referral to Physical Therapy for Evaluation & Treatment    2. Obesity, morbid, BMI 40.0-49.9    Other orders  -     Medium Joint Arthrocentesis: R elbow        The patient presents here today for an evaluation  of his right elbow.     Discussed the risks and benefits of a right lateral epicondyle injection today in the office. Patient expressed understanding and wishes to proceed. He tolerated the injection well and without any complications.     Order for occupational therapy placed today.        Educated on risk of elevated BMI.  Discussed options for weight loss/decreasing BMI prior to procedure including dietician consult, weight loss options and exercise program. and Call or return if worsening symptoms.    Follow Up     PRN      Patient was given instructions and counseling regarding his condition or for health maintenance advice. Please see specific information pulled into the AVS if appropriate.     TransScribed for Cm Rosenthal MD by Yesi Gomez CMA   09/03/24   11:25 EDT    I have personally performed the services described in this document as scribed by the above individual and it is both accurate and complete. Cm Rosenthal MD 09/05/24

## 2024-09-05 RX ORDER — LIDOCAINE HYDROCHLORIDE 10 MG/ML
1 INJECTION, SOLUTION INFILTRATION; PERINEURAL
Status: COMPLETED | OUTPATIENT
Start: 2024-09-03 | End: 2024-09-03

## 2024-09-05 RX ORDER — TRIAMCINOLONE ACETONIDE 40 MG/ML
40 INJECTION, SUSPENSION INTRA-ARTICULAR; INTRAMUSCULAR
Status: COMPLETED | OUTPATIENT
Start: 2024-09-03 | End: 2024-09-03

## 2024-09-14 ENCOUNTER — LAB (OUTPATIENT)
Dept: LAB | Facility: HOSPITAL | Age: 54
End: 2024-09-14
Payer: COMMERCIAL

## 2024-09-14 DIAGNOSIS — Z00.00 ANNUAL PHYSICAL EXAM: ICD-10-CM

## 2024-09-14 DIAGNOSIS — Z12.5 SCREENING FOR MALIGNANT NEOPLASM OF PROSTATE: ICD-10-CM

## 2024-09-14 DIAGNOSIS — Z11.59 NEED FOR HEPATITIS C SCREENING TEST: ICD-10-CM

## 2024-09-14 DIAGNOSIS — E55.9 VITAMIN D DEFICIENCY: ICD-10-CM

## 2024-09-14 DIAGNOSIS — R73.01 IMPAIRED FASTING GLUCOSE: ICD-10-CM

## 2024-09-14 DIAGNOSIS — I10 ESSENTIAL HYPERTENSION: ICD-10-CM

## 2024-09-14 LAB
25(OH)D3 SERPL-MCNC: 16.2 NG/ML (ref 30–100)
ALBUMIN SERPL-MCNC: 3.9 G/DL (ref 3.5–5.2)
ALBUMIN/GLOB SERPL: 1.3 G/DL
ALP SERPL-CCNC: 64 U/L (ref 39–117)
ALT SERPL W P-5'-P-CCNC: 29 U/L (ref 1–41)
ANION GAP SERPL CALCULATED.3IONS-SCNC: 13.7 MMOL/L (ref 5–15)
AST SERPL-CCNC: 26 U/L (ref 1–40)
BASOPHILS # BLD AUTO: 0.03 10*3/MM3 (ref 0–0.2)
BASOPHILS NFR BLD AUTO: 0.3 % (ref 0–1.5)
BILIRUB SERPL-MCNC: 0.5 MG/DL (ref 0–1.2)
BUN SERPL-MCNC: 14 MG/DL (ref 6–20)
BUN/CREAT SERPL: 17.3 (ref 7–25)
CALCIUM SPEC-SCNC: 9.4 MG/DL (ref 8.6–10.5)
CHLORIDE SERPL-SCNC: 101 MMOL/L (ref 98–107)
CHOLEST SERPL-MCNC: 156 MG/DL (ref 0–200)
CO2 SERPL-SCNC: 22.3 MMOL/L (ref 22–29)
CREAT SERPL-MCNC: 0.81 MG/DL (ref 0.76–1.27)
DEPRECATED RDW RBC AUTO: 41.8 FL (ref 37–54)
EGFRCR SERPLBLD CKD-EPI 2021: 106.1 ML/MIN/1.73
EOSINOPHIL # BLD AUTO: 0.08 10*3/MM3 (ref 0–0.4)
EOSINOPHIL NFR BLD AUTO: 0.7 % (ref 0.3–6.2)
ERYTHROCYTE [DISTWIDTH] IN BLOOD BY AUTOMATED COUNT: 12.4 % (ref 12.3–15.4)
FOLATE SERPL-MCNC: 15.8 NG/ML (ref 4.78–24.2)
GLOBULIN UR ELPH-MCNC: 2.9 GM/DL
GLUCOSE SERPL-MCNC: 112 MG/DL (ref 65–99)
HBA1C MFR BLD: 5.8 % (ref 4.8–5.6)
HCT VFR BLD AUTO: 51.6 % (ref 37.5–51)
HDLC SERPL-MCNC: 50 MG/DL (ref 40–60)
HGB BLD-MCNC: 17.1 G/DL (ref 13–17.7)
IMM GRANULOCYTES # BLD AUTO: 0.06 10*3/MM3 (ref 0–0.05)
IMM GRANULOCYTES NFR BLD AUTO: 0.5 % (ref 0–0.5)
LDLC SERPL CALC-MCNC: 90 MG/DL (ref 0–100)
LDLC/HDLC SERPL: 1.78 {RATIO}
LYMPHOCYTES # BLD AUTO: 1.42 10*3/MM3 (ref 0.7–3.1)
LYMPHOCYTES NFR BLD AUTO: 12.5 % (ref 19.6–45.3)
MAGNESIUM SERPL-MCNC: 1.6 MG/DL (ref 1.6–2.6)
MCH RBC QN AUTO: 30.4 PG (ref 26.6–33)
MCHC RBC AUTO-ENTMCNC: 33.1 G/DL (ref 31.5–35.7)
MCV RBC AUTO: 91.8 FL (ref 79–97)
MONOCYTES # BLD AUTO: 1.01 10*3/MM3 (ref 0.1–0.9)
MONOCYTES NFR BLD AUTO: 8.9 % (ref 5–12)
NEUTROPHILS NFR BLD AUTO: 77.1 % (ref 42.7–76)
NEUTROPHILS NFR BLD AUTO: 8.79 10*3/MM3 (ref 1.7–7)
NRBC BLD AUTO-RTO: 0 /100 WBC (ref 0–0.2)
PLATELET # BLD AUTO: 392 10*3/MM3 (ref 140–450)
PMV BLD AUTO: 10.3 FL (ref 6–12)
POTASSIUM SERPL-SCNC: 4.2 MMOL/L (ref 3.5–5.2)
PROT SERPL-MCNC: 6.8 G/DL (ref 6–8.5)
PSA SERPL-MCNC: 1.14 NG/ML (ref 0–4)
RBC # BLD AUTO: 5.62 10*6/MM3 (ref 4.14–5.8)
SODIUM SERPL-SCNC: 137 MMOL/L (ref 136–145)
T4 FREE SERPL-MCNC: 1.42 NG/DL (ref 0.92–1.68)
TRIGL SERPL-MCNC: 86 MG/DL (ref 0–150)
TSH SERPL DL<=0.05 MIU/L-ACNC: 2.02 UIU/ML (ref 0.27–4.2)
VIT B12 BLD-MCNC: 312 PG/ML (ref 211–946)
VLDLC SERPL-MCNC: 16 MG/DL (ref 5–40)
WBC NRBC COR # BLD AUTO: 11.39 10*3/MM3 (ref 3.4–10.8)

## 2024-09-14 PROCEDURE — 86803 HEPATITIS C AB TEST: CPT

## 2024-09-14 PROCEDURE — 82306 VITAMIN D 25 HYDROXY: CPT

## 2024-09-14 PROCEDURE — 82746 ASSAY OF FOLIC ACID SERUM: CPT

## 2024-09-14 PROCEDURE — 80061 LIPID PANEL: CPT

## 2024-09-14 PROCEDURE — 83735 ASSAY OF MAGNESIUM: CPT

## 2024-09-14 PROCEDURE — 82607 VITAMIN B-12: CPT

## 2024-09-14 PROCEDURE — 83036 HEMOGLOBIN GLYCOSYLATED A1C: CPT

## 2024-09-14 PROCEDURE — 84439 ASSAY OF FREE THYROXINE: CPT

## 2024-09-14 PROCEDURE — G0103 PSA SCREENING: HCPCS

## 2024-09-14 PROCEDURE — 36415 COLL VENOUS BLD VENIPUNCTURE: CPT

## 2024-09-14 PROCEDURE — 80050 GENERAL HEALTH PANEL: CPT

## 2024-09-16 DIAGNOSIS — R73.01 IMPAIRED FASTING GLUCOSE: ICD-10-CM

## 2024-09-16 DIAGNOSIS — E55.9 VITAMIN D DEFICIENCY: ICD-10-CM

## 2024-09-16 DIAGNOSIS — I10 ESSENTIAL HYPERTENSION: ICD-10-CM

## 2024-09-16 DIAGNOSIS — Z11.59 NEED FOR HEPATITIS C SCREENING TEST: Primary | ICD-10-CM

## 2024-09-16 LAB — HCV AB SER QL: NORMAL

## 2024-09-16 RX ORDER — ERGOCALCIFEROL 1.25 MG/1
50000 CAPSULE, LIQUID FILLED ORAL WEEKLY
Qty: 13 CAPSULE | Refills: 1 | Status: SHIPPED | OUTPATIENT
Start: 2024-09-16

## 2024-10-03 ENCOUNTER — TELEPHONE (OUTPATIENT)
Dept: SLEEP MEDICINE | Facility: HOSPITAL | Age: 54
End: 2024-10-03
Payer: COMMERCIAL

## 2024-10-03 NOTE — TELEPHONE ENCOUNTER
Called patient, patient informed me that he called and left message yesterday to cancel appointment for today. Patient stated he will call back to reschedule.

## 2024-10-07 RX ORDER — CYCLOBENZAPRINE HCL 10 MG
10 TABLET ORAL 2 TIMES DAILY PRN
Qty: 30 TABLET | Refills: 1 | Status: SHIPPED | OUTPATIENT
Start: 2024-10-07

## 2024-10-10 RX ORDER — ATENOLOL 25 MG/1
25 TABLET ORAL 2 TIMES DAILY
Qty: 60 TABLET | Refills: 5 | Status: SHIPPED | OUTPATIENT
Start: 2024-10-10
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
## 2024-12-18 ENCOUNTER — OFFICE VISIT (OUTPATIENT)
Dept: INTERNAL MEDICINE | Age: 54
End: 2024-12-18
Payer: COMMERCIAL

## 2024-12-18 VITALS
OXYGEN SATURATION: 98 % | SYSTOLIC BLOOD PRESSURE: 115 MMHG | HEART RATE: 63 BPM | BODY MASS INDEX: 47.01 KG/M2 | DIASTOLIC BLOOD PRESSURE: 80 MMHG | TEMPERATURE: 97 F | WEIGHT: 310.2 LBS | HEIGHT: 68 IN

## 2024-12-18 DIAGNOSIS — Z00.00 ANNUAL PHYSICAL EXAM: Primary | ICD-10-CM

## 2024-12-18 DIAGNOSIS — E55.9 VITAMIN D DEFICIENCY: ICD-10-CM

## 2024-12-18 DIAGNOSIS — I10 ESSENTIAL HYPERTENSION: Chronic | ICD-10-CM

## 2024-12-18 DIAGNOSIS — M1A.00X0 CHRONIC GOUTY ARTHRITIS: Chronic | ICD-10-CM

## 2024-12-18 DIAGNOSIS — E53.8 FOLIC ACID DEFICIENCY: ICD-10-CM

## 2024-12-18 DIAGNOSIS — S39.012D LOW BACK STRAIN, SUBSEQUENT ENCOUNTER: ICD-10-CM

## 2024-12-18 DIAGNOSIS — Z86.711 HISTORY OF PULMONARY EMBOLUS (PE): Chronic | ICD-10-CM

## 2024-12-18 DIAGNOSIS — R73.01 IMPAIRED FASTING GLUCOSE: ICD-10-CM

## 2024-12-18 DIAGNOSIS — J30.2 SEASONAL ALLERGIC RHINITIS, UNSPECIFIED TRIGGER: Chronic | ICD-10-CM

## 2024-12-18 DIAGNOSIS — L57.0 ACTINIC KERATOSES: ICD-10-CM

## 2024-12-18 DIAGNOSIS — R41.3 EPISODE OF MEMORY LOSS: ICD-10-CM

## 2024-12-18 DIAGNOSIS — K21.9 GASTRO-ESOPHAGEAL REFLUX DISEASE WITHOUT ESOPHAGITIS: Chronic | ICD-10-CM

## 2024-12-18 PROCEDURE — 99396 PREV VISIT EST AGE 40-64: CPT | Performed by: NURSE PRACTITIONER

## 2024-12-18 PROCEDURE — 99214 OFFICE O/P EST MOD 30 MIN: CPT | Performed by: NURSE PRACTITIONER

## 2024-12-18 RX ORDER — ERGOCALCIFEROL 1.25 MG/1
50000 CAPSULE, LIQUID FILLED ORAL WEEKLY
Qty: 13 CAPSULE | Refills: 1 | Status: SHIPPED | OUTPATIENT
Start: 2024-12-18

## 2024-12-18 RX ORDER — PANTOPRAZOLE SODIUM 40 MG/1
40 TABLET, DELAYED RELEASE ORAL DAILY
Qty: 90 TABLET | Refills: 1 | Status: SHIPPED | OUTPATIENT
Start: 2024-12-18

## 2024-12-18 RX ORDER — LISINOPRIL 40 MG/1
40 TABLET ORAL DAILY
Qty: 30 TABLET | Refills: 5 | Status: SHIPPED | OUTPATIENT
Start: 2024-12-18

## 2024-12-18 RX ORDER — CYCLOBENZAPRINE HCL 10 MG
10 TABLET ORAL 2 TIMES DAILY PRN
Qty: 30 TABLET | Refills: 1 | Status: SHIPPED | OUTPATIENT
Start: 2024-12-18

## 2024-12-18 RX ORDER — ATENOLOL 25 MG/1
25 TABLET ORAL 2 TIMES DAILY
Qty: 60 TABLET | Refills: 5 | Status: SHIPPED | OUTPATIENT
Start: 2024-12-18

## 2024-12-18 RX ORDER — FLUTICASONE PROPIONATE 50 MCG
2 SPRAY, SUSPENSION (ML) NASAL DAILY
Qty: 9.9 G | Refills: 3 | Status: SHIPPED | OUTPATIENT
Start: 2024-12-18

## 2024-12-18 RX ORDER — ALLOPURINOL 100 MG/1
100 TABLET ORAL DAILY
Qty: 30 TABLET | Refills: 5 | Status: SHIPPED | OUTPATIENT
Start: 2024-12-18

## 2024-12-18 RX ORDER — HYDROCHLOROTHIAZIDE 50 MG/1
50 TABLET ORAL DAILY
Qty: 30 TABLET | Refills: 5 | Status: SHIPPED | OUTPATIENT
Start: 2024-12-18

## 2024-12-23 ENCOUNTER — TELEPHONE (OUTPATIENT)
Dept: INTERNAL MEDICINE | Age: 54
End: 2024-12-23
Payer: COMMERCIAL

## 2024-12-23 DIAGNOSIS — R41.3 EPISODE OF MEMORY LOSS: Primary | ICD-10-CM

## 2025-01-02 ENCOUNTER — HOSPITAL ENCOUNTER (EMERGENCY)
Facility: HOSPITAL | Age: 55
Discharge: HOME | End: 2025-01-02
Attending: EMERGENCY MEDICINE
Payer: COMMERCIAL

## 2025-01-02 ENCOUNTER — APPOINTMENT (OUTPATIENT)
Dept: GENERAL RADIOLOGY | Facility: HOSPITAL | Age: 55
End: 2025-01-02
Payer: COMMERCIAL

## 2025-01-02 VITALS
BODY MASS INDEX: 47.74 KG/M2 | DIASTOLIC BLOOD PRESSURE: 50 MMHG | HEART RATE: 66 BPM | TEMPERATURE: 98 F | SYSTOLIC BLOOD PRESSURE: 115 MMHG | OXYGEN SATURATION: 96 % | WEIGHT: 315 LBS | HEIGHT: 68 IN | RESPIRATION RATE: 18 BRPM

## 2025-01-02 DIAGNOSIS — R07.9 CHEST PAIN, UNSPECIFIED TYPE: Primary | ICD-10-CM

## 2025-01-02 LAB
ALBUMIN SERPL-MCNC: 3.5 G/DL (ref 3.5–5.2)
ALBUMIN/GLOB SERPL: 1 G/DL
ALP SERPL-CCNC: 60 U/L (ref 39–117)
ALT SERPL W P-5'-P-CCNC: 26 U/L (ref 1–41)
ANION GAP SERPL CALCULATED.3IONS-SCNC: 10.7 MMOL/L (ref 5–15)
AST SERPL-CCNC: 30 U/L (ref 1–40)
BASOPHILS # BLD AUTO: 0.06 10*3/MM3 (ref 0–0.2)
BASOPHILS NFR BLD AUTO: 0.6 % (ref 0–1.5)
BILIRUB SERPL-MCNC: 0.4 MG/DL (ref 0–1.2)
BUN SERPL-MCNC: 9 MG/DL (ref 6–20)
BUN/CREAT SERPL: 10.7 (ref 7–25)
CALCIUM SPEC-SCNC: 8.8 MG/DL (ref 8.6–10.5)
CHLORIDE SERPL-SCNC: 96 MMOL/L (ref 98–107)
CO2 SERPL-SCNC: 28.3 MMOL/L (ref 22–29)
CREAT SERPL-MCNC: 0.84 MG/DL (ref 0.76–1.27)
DEPRECATED RDW RBC AUTO: 42.6 FL (ref 37–54)
EGFRCR SERPLBLD CKD-EPI 2021: 104.3 ML/MIN/1.73
EOSINOPHIL # BLD AUTO: 0.18 10*3/MM3 (ref 0–0.4)
EOSINOPHIL NFR BLD AUTO: 1.8 % (ref 0.3–6.2)
ERYTHROCYTE [DISTWIDTH] IN BLOOD BY AUTOMATED COUNT: 13.1 % (ref 12.3–15.4)
FLUAV SUBTYP SPEC NAA+PROBE: NOT DETECTED
FLUBV RNA ISLT QL NAA+PROBE: NOT DETECTED
GEN 5 1HR TROPONIN T REFLEX: 9 NG/L
GLOBULIN UR ELPH-MCNC: 3.4 GM/DL
GLUCOSE SERPL-MCNC: 143 MG/DL (ref 65–99)
HCT VFR BLD AUTO: 48.9 % (ref 37.5–51)
HGB BLD-MCNC: 16.3 G/DL (ref 13–17.7)
HOLD SPECIMEN: NORMAL
HOLD SPECIMEN: NORMAL
IMM GRANULOCYTES # BLD AUTO: 0.07 10*3/MM3 (ref 0–0.05)
IMM GRANULOCYTES NFR BLD AUTO: 0.7 % (ref 0–0.5)
LIPASE SERPL-CCNC: 24 U/L (ref 13–60)
LYMPHOCYTES # BLD AUTO: 1.25 10*3/MM3 (ref 0.7–3.1)
LYMPHOCYTES NFR BLD AUTO: 12.7 % (ref 19.6–45.3)
MAGNESIUM SERPL-MCNC: 1.5 MG/DL (ref 1.6–2.6)
MCH RBC QN AUTO: 30 PG (ref 26.6–33)
MCHC RBC AUTO-ENTMCNC: 33.3 G/DL (ref 31.5–35.7)
MCV RBC AUTO: 89.9 FL (ref 79–97)
MONOCYTES # BLD AUTO: 0.97 10*3/MM3 (ref 0.1–0.9)
MONOCYTES NFR BLD AUTO: 9.9 % (ref 5–12)
NEUTROPHILS NFR BLD AUTO: 7.28 10*3/MM3 (ref 1.7–7)
NEUTROPHILS NFR BLD AUTO: 74.3 % (ref 42.7–76)
NRBC BLD AUTO-RTO: 0 /100 WBC (ref 0–0.2)
NT-PROBNP SERPL-MCNC: 62.5 PG/ML (ref 0–900)
PLATELET # BLD AUTO: 317 10*3/MM3 (ref 140–450)
PMV BLD AUTO: 9.2 FL (ref 6–12)
POTASSIUM SERPL-SCNC: 3.9 MMOL/L (ref 3.5–5.2)
PROT SERPL-MCNC: 6.9 G/DL (ref 6–8.5)
QT INTERVAL: 382 MS
QTC INTERVAL: 433 MS
RBC # BLD AUTO: 5.44 10*6/MM3 (ref 4.14–5.8)
RSV RNA NPH QL NAA+NON-PROBE: NOT DETECTED
SARS-COV-2 RNA RESP QL NAA+PROBE: NOT DETECTED
SODIUM SERPL-SCNC: 135 MMOL/L (ref 136–145)
TROPONIN T NUMERIC DELTA: 0 NG/L
TROPONIN T SERPL HS-MCNC: 9 NG/L
WBC NRBC COR # BLD AUTO: 9.81 10*3/MM3 (ref 3.4–10.8)
WHOLE BLOOD HOLD COAG: NORMAL
WHOLE BLOOD HOLD SPECIMEN: NORMAL

## 2025-01-02 PROCEDURE — 99284 EMERGENCY DEPT VISIT MOD MDM: CPT

## 2025-01-02 PROCEDURE — 83880 ASSAY OF NATRIURETIC PEPTIDE: CPT | Performed by: EMERGENCY MEDICINE

## 2025-01-02 PROCEDURE — 83690 ASSAY OF LIPASE: CPT | Performed by: EMERGENCY MEDICINE

## 2025-01-02 PROCEDURE — 93005 ELECTROCARDIOGRAM TRACING: CPT | Performed by: EMERGENCY MEDICINE

## 2025-01-02 PROCEDURE — 93005 ELECTROCARDIOGRAM TRACING: CPT

## 2025-01-02 PROCEDURE — 80053 COMPREHEN METABOLIC PANEL: CPT | Performed by: EMERGENCY MEDICINE

## 2025-01-02 PROCEDURE — 87637 SARSCOV2&INF A&B&RSV AMP PRB: CPT | Performed by: EMERGENCY MEDICINE

## 2025-01-02 PROCEDURE — 36415 COLL VENOUS BLD VENIPUNCTURE: CPT

## 2025-01-02 PROCEDURE — 83735 ASSAY OF MAGNESIUM: CPT | Performed by: EMERGENCY MEDICINE

## 2025-01-02 PROCEDURE — 71045 X-RAY EXAM CHEST 1 VIEW: CPT

## 2025-01-02 PROCEDURE — 85025 COMPLETE CBC W/AUTO DIFF WBC: CPT | Performed by: EMERGENCY MEDICINE

## 2025-01-02 PROCEDURE — 84484 ASSAY OF TROPONIN QUANT: CPT | Performed by: EMERGENCY MEDICINE

## 2025-01-02 RX ORDER — SODIUM CHLORIDE 0.9 % (FLUSH) 0.9 %
10 SYRINGE (ML) INJECTION AS NEEDED
Status: DISCONTINUED | OUTPATIENT
Start: 2025-01-02 | End: 2025-01-02 | Stop reason: HOSPADM

## 2025-01-02 RX ORDER — ASPIRIN 81 MG/1
324 TABLET, CHEWABLE ORAL ONCE
Status: COMPLETED | OUTPATIENT
Start: 2025-01-02 | End: 2025-01-02

## 2025-01-02 RX ADMIN — ASPIRIN 324 MG: 81 TABLET, CHEWABLE ORAL at 11:08

## 2025-01-02 NOTE — Clinical Note
[facility] EMERGENCY ROOM  [address]  [place] [address]  Phone: [phone]  Fax: [phone]    Kevin Bradshaw was seen and treated in our emergency department on 1/2/2025.  He may return to work on 01/04/2025.  With no restrictions        Thank you for choosing [facility].    Wilder Marrero, DO

## 2025-01-02 NOTE — Clinical Note
[facility] EMERGENCY ROOM  [address] [place] [address]  Phone: [phone]  Fax: [phone]    Kevin Bradshaw was seen and treated in our emergency department on 1/2/2025.  He may return to work on 01/04/2025.         Thank you for choosing [facility].    Wilder Marrero, DO

## 2025-01-02 NOTE — Clinical Note
[facility] EMERGENCY ROOM  [address] [place] [address]  Phone: [phone]  Fax: [phone]    Kevin Bradshaw was seen and treated in our emergency department on 1/2/2025.  He may return to work on 01/04/2025.         Thank you for choosing [facility].    Wilder Marrero, DO

## 2025-01-02 NOTE — ED PROVIDER NOTES
Time: 12:36 PM EST  Date of encounter:  1/2/2025  Independent Historian/Clinical History and Information was obtained by:   Patient    History is limited by: N/A    Chief Complaint: Chest pain      History of Present Illness:  Patient is a 54 y.o. year old male who presents to the emergency department for evaluation of chest pain.  The patient is a  for [employer] and states that he had brief intermittent episodes of chest discomfort and also shaking all over.  He does have a history of hypertension.  Currently he is asymptomatic.      Patient Care Team  Primary Care Provider: Jennifer Hobbs APRN    Past Medical History:     Allergies   Allergen Reactions    Other GI Intolerance     Onions, peppers     Past Medical History:   Diagnosis Date    Allergies     Anxiety 08/17/2021    Degeneration of lumbar intervertebral disc 02/05/2016    Fatigue 02/02/2023    GERD (gastroesophageal reflux disease)     Gout     High blood pressure     Hypertension     Impaired fasting glucose 03/15/2022    Male hypogonadism 07/28/2021    Mid back pain 02/24/2016    Obstructive sleep apnea 07/28/2021    Polycythemia, secondary 03/15/2022    Related to ETOH abuse     Pulmonary embolism     Tendinitis of elbow 07/16/2021    Thoracic back pain     Vitamin D deficiency 07/28/2021     Past Surgical History:   Procedure Laterality Date    CHOLECYSTECTOMY      GALLBLADDER SURGERY      HEMORRHOIDECTOMY      LUMBAR EPIDURAL INJECTION  2015    TONSILLECTOMY       Family History   Problem Relation Age of Onset    Hypertension Mother     Hypertension Father        Home Medications:  Prior to Admission medications    Medication Sig Start Date End Date Taking? Authorizing Provider   allopurinol (ZYLOPRIM) 100 MG tablet Take 1 tablet by mouth Daily. 12/18/24   Jennifer Hobbs APRN   apixaban (Eliquis) 5 MG tablet tablet Take 1 tablet by mouth Every 12 (Twelve) Hours. 12/18/24   Jennifer Hobbs APRN   atenolol (TENORMIN) 25 MG tablet Take  1 tablet by mouth 2 (Two) Times a Day. 12/18/24   Jennifer Hobbs APRN   cyclobenzaprine (FLEXERIL) 10 MG tablet Take 1 tablet by mouth 2 (Two) Times a Day As Needed for Muscle Spasms. 12/18/24   Jennifer Hobbs APRN   fluticasone (Flonase) 50 MCG/ACT nasal spray Administer 2 sprays into the nostril(s) as directed by provider Daily. 12/18/24   Jennifer Hobbs APRN   hydroCHLOROthiazide 50 MG tablet Take 1 tablet by mouth Daily. 12/18/24   Jennifer Hobbs APRN   lisinopril (PRINIVIL,ZESTRIL) 40 MG tablet Take 1 tablet by mouth Daily. 12/18/24   Jennifer Hobbs APRN   pantoprazole (PROTONIX) 40 MG EC tablet Take 1 tablet by mouth Daily. 12/18/24   Jennifer Hobbs APRN   vitamin D (ERGOCALCIFEROL) 1.25 MG (76770 UT) capsule capsule Take 1 capsule by mouth 1 (One) Time Per Week. Take with a meal. 12/18/24   Jennifer Hobbs APRN        Social History:   Social History     Tobacco Use    Smoking status: Never     Passive exposure: Never    Smokeless tobacco: Current     Types: Chew   Vaping Use    Vaping status: Never Used   Substance Use Topics    Alcohol use: Yes     Alcohol/week: 28.0 standard drinks of alcohol     Types: 28 Standard drinks or equivalent per week     Comment: occais    Drug use: Never         Review of Systems:  Review of Systems   Constitutional:  Negative for chills and fever.   HENT:  Negative for congestion, ear pain and sore throat.    Eyes:  Negative for pain.   Respiratory:  Negative for cough, chest tightness and shortness of breath.    Cardiovascular:  Positive for chest pain.   Gastrointestinal:  Negative for abdominal pain, diarrhea, nausea and vomiting.   Genitourinary:  Negative for flank pain and hematuria.   Musculoskeletal:  Negative for joint swelling.   Skin:  Negative for pallor.   Neurological:  Negative for seizures and headaches.   All other systems reviewed and are negative.       Physical Exam:  /50 (BP Location: Left arm)   Pulse 66   Temp 98 °F (36.7 °C)  "(Oral)   Resp 18   Ht 172.7 cm (68\")   Wt (!) 151 kg (332 lb 7.3 oz)   SpO2 96%   BMI 50.55 kg/m²     Physical Exam  Vitals and nursing note reviewed.   Constitutional:       General: He is not in acute distress.     Appearance: Normal appearance. He is not toxic-appearing.   HENT:      Head: Normocephalic and atraumatic.      Mouth/Throat:      Mouth: Mucous membranes are moist.   Eyes:      General: No scleral icterus.  Cardiovascular:      Rate and Rhythm: Normal rate and regular rhythm.      Pulses: Normal pulses.      Heart sounds: Normal heart sounds.   Pulmonary:      Effort: Pulmonary effort is normal. No respiratory distress.      Breath sounds: Normal breath sounds.   Abdominal:      General: Abdomen is flat.      Palpations: Abdomen is soft.      Tenderness: There is no abdominal tenderness.   Musculoskeletal:         General: Normal range of motion.      Cervical back: Normal range of motion and neck supple.   Skin:     General: Skin is warm and dry.   Neurological:      Mental Status: He is alert and oriented to person, place, and time. Mental status is at baseline.                    Medical Decision Making:      Comorbidities that affect care:    Hypertension    External Notes reviewed:    Previous Clinic Note: Office visit for wellness exam and hypertension      The following orders were placed and all results were independently analyzed by me:  Orders Placed This Encounter   Procedures    COVID PRE-OP / PRE-PROCEDURE SCREENING ORDER (NO ISOLATION) - Swab, Nasopharynx    COVID-19, FLU A/B, RSV PCR 1 HR TAT - Swab, Nasopharynx    XR Chest 1 View    Sterling Draw    High Sensitivity Troponin T    Comprehensive Metabolic Panel    Lipase    BNP    Magnesium    CBC Auto Differential    High Sensitivity Troponin T 1Hr    Undress & Gown    Continuous Pulse Oximetry    ECG 12 Lead ED Triage Standing Order; Chest Pain    CBC & Differential    Green Top (Gel)    Lavender Top    Gold Top - SST    Light Blue " Top       Medications Given in the Emergency Department:  Medications   aspirin chewable tablet 324 mg (324 mg Oral Given 1/2/25 1108)        ED Course:         EKG: Sinus rhythm rate of 77 bpm  No acute ischemia    Labs:    Lab Results (last 24 hours)       Procedure Component Value Units Date/Time    COVID PRE-OP / PRE-PROCEDURE SCREENING ORDER (NO ISOLATION) - Swab, Nasopharynx [[id number]]  (Normal) Collected: 01/02/25 1006    Specimen: Swab from Nasopharynx Updated: 01/02/25 1052    Narrative:      The following orders were created for panel order COVID PRE-OP / PRE-PROCEDURE SCREENING ORDER (NO ISOLATION) - Swab, Nasopharynx.  Procedure                               Abnormality         Status                     ---------                               -----------         ------                     COVID-19, FLU A/B, RSV P...[[id number]]  Normal              Final result                 Please view results for these tests on the individual orders.    COVID-19, FLU A/B, RSV PCR 1 HR TAT - Swab, Nasopharynx [[id number]]  (Normal) Collected: 01/02/25 1006    Specimen: Swab from Nasopharynx Updated: 01/02/25 1052     COVID19 Not Detected     Influenza A PCR Not Detected     Influenza B PCR Not Detected     RSV, PCR Not Detected    Narrative:      Fact sheet for providers: https://www.fda.gov/media/223504/download    Fact sheet for patients: https://www.fda.gov/media/023158/download    Test performed by PCR.    High Sensitivity Troponin T [[id number]]  (Normal) Collected: 01/02/25 1028    Specimen: Blood Updated: 01/02/25 1102     HS Troponin T 9 ng/L     Narrative:      High Sensitive Troponin T Reference Range:  <14.0 ng/L- Negative Female for AMI  <22.0 ng/L- Negative Male for AMI  >=14 - Abnormal Female indicating possible myocardial injury.  >=22 - Abnormal Male indicating possible myocardial injury.   Clinicians would have to utilize clinical acumen, EKG, Troponin, and serial changes to determine if it is an Acute  Myocardial Infarction or myocardial injury due to an underlying chronic condition.         CBC & Differential [[id number]]  (Abnormal) Collected: 01/02/25 1028    Specimen: Blood Updated: 01/02/25 1040    Narrative:      The following orders were created for panel order CBC & Differential.  Procedure                               Abnormality         Status                     ---------                               -----------         ------                     CBC Auto Differential[[id number]]        Abnormal            Final result                 Please view results for these tests on the individual orders.    Comprehensive Metabolic Panel [[id number]]  (Abnormal) Collected: 01/02/25 1028    Specimen: Blood Updated: 01/02/25 1102     Glucose 143 mg/dL      BUN 9 mg/dL      Creatinine 0.84 mg/dL      Sodium 135 mmol/L      Potassium 3.9 mmol/L      Chloride 96 mmol/L      CO2 28.3 mmol/L      Calcium 8.8 mg/dL      Total Protein 6.9 g/dL      Albumin 3.5 g/dL      ALT (SGPT) 26 U/L      AST (SGOT) 30 U/L      Alkaline Phosphatase 60 U/L      Total Bilirubin 0.4 mg/dL      Globulin 3.4 gm/dL      A/G Ratio 1.0 g/dL      BUN/Creatinine Ratio 10.7     Anion Gap 10.7 mmol/L      eGFR 104.3 mL/min/1.73     Narrative:      GFR Categories in Chronic Kidney Disease (CKD)      GFR Category          GFR (mL/min/1.73)    Interpretation  G1                     90 or greater         Normal or high (1)  G2                      60-89                Mild decrease (1)  G3a                   45-59                Mild to moderate decrease  G3b                   30-44                Moderate to severe decrease  G4                    15-29                Severe decrease  G5                    14 or less           Kidney failure          (1)In the absence of evidence of kidney disease, neither GFR category G1 or G2 fulfill the criteria for CKD.    eGFR calculation 2021 CKD-EPI creatinine equation, which does not include race as a factor     Lipase [[id number]]  (Normal) Collected: 01/02/25 1028    Specimen: Blood Updated: 01/02/25 1102     Lipase 24 U/L     BNP [[id number]]  (Normal) Collected: 01/02/25 1028    Specimen: Blood Updated: 01/02/25 1100     proBNP 62.5 pg/mL     Narrative:      This assay is used as an aid in the diagnosis of individuals suspected of having heart failure. It can be used as an aid in the diagnosis of acute decompensated heart failure (ADHF) in patients presenting with signs and symptoms of ADHF to the emergency department (ED). In addition, NT-proBNP of <300 pg/mL indicates ADHF is not likely.    Age Range Result Interpretation  NT-proBNP Concentration (pg/mL:      <50             Positive            >450                   Gray                 300-450                    Negative             <300    50-75           Positive            >900                  Gray                300-900                  Negative            <300      >75             Positive            >1800                  Gray                300-1800                  Negative            <300    Magnesium [[id number]]  (Abnormal) Collected: 01/02/25 1028    Specimen: Blood Updated: 01/02/25 1102     Magnesium 1.5 mg/dL     CBC Auto Differential [[id number]]  (Abnormal) Collected: 01/02/25 1028    Specimen: Blood Updated: 01/02/25 1040     WBC 9.81 10*3/mm3      RBC 5.44 10*6/mm3      Hemoglobin 16.3 g/dL      Hematocrit 48.9 %      MCV 89.9 fL      MCH 30.0 pg      MCHC 33.3 g/dL      RDW 13.1 %      RDW-SD 42.6 fl      MPV 9.2 fL      Platelets 317 10*3/mm3      Neutrophil % 74.3 %      Lymphocyte % 12.7 %      Monocyte % 9.9 %      Eosinophil % 1.8 %      Basophil % 0.6 %      Immature Grans % 0.7 %      Neutrophils, Absolute 7.28 10*3/mm3      Lymphocytes, Absolute 1.25 10*3/mm3      Monocytes, Absolute 0.97 10*3/mm3      Eosinophils, Absolute 0.18 10*3/mm3      Basophils, Absolute 0.06 10*3/mm3      Immature Grans, Absolute 0.07 10*3/mm3      nRBC 0.0 /100  WBC     High Sensitivity Troponin T 1Hr [[id number]]  (Normal) Collected: 01/02/25 1224    Specimen: Blood from Arm, Right Updated: 01/02/25 1301     HS Troponin T 9 ng/L      Troponin T Numeric Delta 0 ng/L     Narrative:      High Sensitive Troponin T Reference Range:  <14.0 ng/L- Negative Female for AMI  <22.0 ng/L- Negative Male for AMI  >=14 - Abnormal Female indicating possible myocardial injury.  >=22 - Abnormal Male indicating possible myocardial injury.   Clinicians would have to utilize clinical acumen, EKG, Troponin, and serial changes to determine if it is an Acute Myocardial Infarction or myocardial injury due to an underlying chronic condition.                  Imaging:    XR Chest 1 View    Result Date: 1/2/2025  XR CHEST 1 VW Date of Exam: 1/2/2025 9:58 AM EST Indication: Chest Pain Triage Protocol Comparison: 3/27/2024 Findings: Cardiomediastinal silhouette is unremarkable.  No airspace disease, pneumothorax, nor pleural effusion. No acute osseous abnormality identified.     Impression: No acute process identified Electronically Signed: Mario Alberto Mart MD  1/2/2025 10:06 AM EST  Workstation ID: [id number]       Differential Diagnosis and Discussion:    Chest Pain:  Based on the patient's signs and symptoms, I considered aortic dissection, myocardial infaction, pulmonary embolism, cardiac tamponade, pericarditis, pneumothorax, musculoskeletal chest pain and other differential diagnosis as an etiology of the patient's chest pain.     PROCEDURES:    Labs were collected in the emergency department and all labs were reviewed and interpreted by me.  X-ray were performed in the emergency department and all X-ray impressions were independently interpreted by me.  An EKG was performed and the EKG was interpreted by me.    ECG 12 Lead ED Triage Standing Order; Chest Pain   Final Result   HEART RATE=77  bpm   RR Amrwzvbt=804  ms   AK Ngmllssr=404  ms   P Horizontal Axis=40  deg   P Front Axis=28  deg   QRSD  Interval=94  ms   QT Zyfzbaix=223  ms   JIqE=134  ms   QRS Axis=-8  deg   T Wave Axis=31  deg   - OTHERWISE NORMAL ECG -   Sinus rhythm   Low voltage, precordial leads   When compared with ECG of 20-Jun-2021 22:14:32,   Nonspecific significant change   Electronically Signed By: Dion Dunn ([facility]) 2025-01-02 17:07:04   Date and Time of Study:2025-01-02 09:52:22          Procedures    MDM                     Patient Care Considerations:    CT CHEST: I considered ordering a CT scan of the chest, however the patient is currently asymptomatic.      Consultants/Shared Management Plan:    None    Social Determinants of Health:    Patient is independent, reliable, and has access to care.       Disposition and Care Coordination:    Discharged: I considered escalation of care by admitting this patient to the hospital, however the patient is a low risk heart score.    I have explained discharge medications and the need for follow up with the patient/caretakers. This was also printed in the discharge instructions. Patient was discharged with the following medications and follow up:      Medication List      No changes were made to your prescriptions during this visit.      Jennifer Hobbs, APRN  [address]  [address] [place] [address]  [phone]    On 1/6/2025         Final diagnoses:   Chest pain, unspecified type        ED Disposition       ED Disposition   Discharge    Condition   Stable    Comment   --               This medical record created using voice recognition software.             Wilder Marrero DO  01/02/25 190

## 2025-01-02 NOTE — DISCHARGE INSTRUCTIONS
Take your medications as directed.  Drink plenty of fluids.  Return for worsening symptoms.  Follow-up with your doctor next week.

## 2025-01-08 RX ORDER — LISINOPRIL 40 MG/1
40 TABLET ORAL DAILY
Qty: 90 TABLET | Refills: 3 | Status: SHIPPED | OUTPATIENT
Start: 2025-01-08

## 2025-02-17 ENCOUNTER — APPOINTMENT (OUTPATIENT)
Facility: HOSPITAL | Age: 55
End: 2025-02-17
Payer: COMMERCIAL

## 2025-02-17 ENCOUNTER — APPOINTMENT (OUTPATIENT)
Dept: GENERAL RADIOLOGY | Facility: HOSPITAL | Age: 55
End: 2025-02-17
Payer: COMMERCIAL

## 2025-02-17 ENCOUNTER — HOSPITAL ENCOUNTER (EMERGENCY)
Facility: HOSPITAL | Age: 55
Discharge: HOME | End: 2025-02-17
Attending: EMERGENCY MEDICINE | Admitting: EMERGENCY MEDICINE
Payer: COMMERCIAL

## 2025-02-17 VITALS
SYSTOLIC BLOOD PRESSURE: 114 MMHG | HEIGHT: 68 IN | TEMPERATURE: 98 F | HEART RATE: 72 BPM | OXYGEN SATURATION: 96 % | DIASTOLIC BLOOD PRESSURE: 53 MMHG | RESPIRATION RATE: 18 BRPM | BODY MASS INDEX: 47.74 KG/M2 | WEIGHT: 315 LBS

## 2025-02-17 DIAGNOSIS — M79.601 DIFFUSE PAIN IN RIGHT UPPER EXTREMITY: Primary | ICD-10-CM

## 2025-02-17 LAB
ALBUMIN SERPL-MCNC: 3.5 G/DL (ref 3.5–5.2)
ALBUMIN/GLOB SERPL: 1.1 G/DL
ALP SERPL-CCNC: 58 U/L (ref 39–117)
ALT SERPL W P-5'-P-CCNC: 28 U/L (ref 1–41)
ANION GAP SERPL CALCULATED.3IONS-SCNC: 12 MMOL/L (ref 5–15)
AST SERPL-CCNC: 29 U/L (ref 1–40)
BASOPHILS # BLD AUTO: 0.08 10*3/MM3 (ref 0–0.2)
BASOPHILS NFR BLD AUTO: 0.6 % (ref 0–1.5)
BH CV UPPER VENOUS LEFT INTERNAL JUGULAR AUGMENT: NORMAL
BH CV UPPER VENOUS LEFT INTERNAL JUGULAR COMPRESS: NORMAL
BH CV UPPER VENOUS LEFT INTERNAL JUGULAR PHASIC: NORMAL
BH CV UPPER VENOUS LEFT INTERNAL JUGULAR SPONT: NORMAL
BH CV UPPER VENOUS LEFT SUBCLAVIAN AUGMENT: NORMAL
BH CV UPPER VENOUS LEFT SUBCLAVIAN COMPRESS: NORMAL
BH CV UPPER VENOUS LEFT SUBCLAVIAN PHASIC: NORMAL
BH CV UPPER VENOUS LEFT SUBCLAVIAN SPONT: NORMAL
BH CV UPPER VENOUS RIGHT AXILLARY AUGMENT: NORMAL
BH CV UPPER VENOUS RIGHT AXILLARY COMPRESS: NORMAL
BH CV UPPER VENOUS RIGHT AXILLARY PHASIC: NORMAL
BH CV UPPER VENOUS RIGHT AXILLARY SPONT: NORMAL
BH CV UPPER VENOUS RIGHT BASILIC FOREARM COMPRESS: NORMAL
BH CV UPPER VENOUS RIGHT BASILIC UPPER COMPRESS: NORMAL
BH CV UPPER VENOUS RIGHT BRACHIAL COMPRESS: NORMAL
BH CV UPPER VENOUS RIGHT CEPHALIC FOREARM COMPRESS: NORMAL
BH CV UPPER VENOUS RIGHT CEPHALIC UPPER COMPRESS: NORMAL
BH CV UPPER VENOUS RIGHT INTERNAL JUGULAR COMPRESS: NORMAL
BH CV UPPER VENOUS RIGHT INTERNAL JUGULAR PHASIC: NORMAL
BH CV UPPER VENOUS RIGHT INTERNAL JUGULAR SPONT: NORMAL
BH CV UPPER VENOUS RIGHT RADIAL COMPRESS: NORMAL
BH CV UPPER VENOUS RIGHT SUBCLAVIAN AUGMENT: NORMAL
BH CV UPPER VENOUS RIGHT SUBCLAVIAN COMPRESS: NORMAL
BH CV UPPER VENOUS RIGHT SUBCLAVIAN PHASIC: NORMAL
BH CV UPPER VENOUS RIGHT SUBCLAVIAN SPONT: NORMAL
BH CV UPPER VENOUS RIGHT ULNAR COMPRESS: NORMAL
BH CV VAS PRELIMINARY FINDINGS SCRIPTING: 1
BILIRUB SERPL-MCNC: 1.1 MG/DL (ref 0–1.2)
BUN SERPL-MCNC: 9 MG/DL (ref 6–20)
BUN/CREAT SERPL: 11 (ref 7–25)
CALCIUM SPEC-SCNC: 9 MG/DL (ref 8.6–10.5)
CHLORIDE SERPL-SCNC: 98 MMOL/L (ref 98–107)
CO2 SERPL-SCNC: 27 MMOL/L (ref 22–29)
CREAT SERPL-MCNC: 0.82 MG/DL (ref 0.76–1.27)
DEPRECATED RDW RBC AUTO: 43.8 FL (ref 37–54)
EGFRCR SERPLBLD CKD-EPI 2021: 105 ML/MIN/1.73
EOSINOPHIL # BLD AUTO: 0.16 10*3/MM3 (ref 0–0.4)
EOSINOPHIL NFR BLD AUTO: 1.3 % (ref 0.3–6.2)
ERYTHROCYTE [DISTWIDTH] IN BLOOD BY AUTOMATED COUNT: 13.3 % (ref 12.3–15.4)
GLOBULIN UR ELPH-MCNC: 3.3 GM/DL
GLUCOSE SERPL-MCNC: 120 MG/DL (ref 65–99)
HCT VFR BLD AUTO: 48.7 % (ref 37.5–51)
HGB BLD-MCNC: 16.1 G/DL (ref 13–17.7)
HOLD SPECIMEN: NORMAL
IMM GRANULOCYTES # BLD AUTO: 0.06 10*3/MM3 (ref 0–0.05)
IMM GRANULOCYTES NFR BLD AUTO: 0.5 % (ref 0–0.5)
LYMPHOCYTES # BLD AUTO: 1.95 10*3/MM3 (ref 0.7–3.1)
LYMPHOCYTES NFR BLD AUTO: 15.2 % (ref 19.6–45.3)
MCH RBC QN AUTO: 29.9 PG (ref 26.6–33)
MCHC RBC AUTO-ENTMCNC: 33.1 G/DL (ref 31.5–35.7)
MCV RBC AUTO: 90.4 FL (ref 79–97)
MONOCYTES # BLD AUTO: 1.33 10*3/MM3 (ref 0.1–0.9)
MONOCYTES NFR BLD AUTO: 10.4 % (ref 5–12)
NEUTROPHILS NFR BLD AUTO: 72 % (ref 42.7–76)
NEUTROPHILS NFR BLD AUTO: 9.22 10*3/MM3 (ref 1.7–7)
NRBC BLD AUTO-RTO: 0 /100 WBC (ref 0–0.2)
PLATELET # BLD AUTO: 327 10*3/MM3 (ref 140–450)
PMV BLD AUTO: 9.4 FL (ref 6–12)
POTASSIUM SERPL-SCNC: 3.2 MMOL/L (ref 3.5–5.2)
PROT SERPL-MCNC: 6.8 G/DL (ref 6–8.5)
RBC # BLD AUTO: 5.39 10*6/MM3 (ref 4.14–5.8)
SODIUM SERPL-SCNC: 137 MMOL/L (ref 136–145)
URATE SERPL-MCNC: 7.9 MG/DL (ref 3.4–7)
WBC NRBC COR # BLD AUTO: 12.8 10*3/MM3 (ref 3.4–10.8)
WHOLE BLOOD HOLD COAG: NORMAL

## 2025-02-17 PROCEDURE — 99284 EMERGENCY DEPT VISIT MOD MDM: CPT

## 2025-02-17 PROCEDURE — 84550 ASSAY OF BLOOD/URIC ACID: CPT | Performed by: EMERGENCY MEDICINE

## 2025-02-17 PROCEDURE — 73060 X-RAY EXAM OF HUMERUS: CPT

## 2025-02-17 PROCEDURE — 80053 COMPREHEN METABOLIC PANEL: CPT | Performed by: EMERGENCY MEDICINE

## 2025-02-17 PROCEDURE — 93971 EXTREMITY STUDY: CPT

## 2025-02-17 PROCEDURE — 85025 COMPLETE CBC W/AUTO DIFF WBC: CPT | Performed by: EMERGENCY MEDICINE

## 2025-02-17 PROCEDURE — 96374 THER/PROPH/DIAG INJ IV PUSH: CPT

## 2025-02-17 PROCEDURE — 73090 X-RAY EXAM OF FOREARM: CPT

## 2025-02-17 PROCEDURE — 93971 EXTREMITY STUDY: CPT | Performed by: SURGERY

## 2025-02-17 PROCEDURE — 25010000002 KETOROLAC TROMETHAMINE PER 15 MG: Performed by: EMERGENCY MEDICINE

## 2025-02-17 RX ORDER — KETOROLAC TROMETHAMINE 30 MG/ML
30 INJECTION, SOLUTION INTRAMUSCULAR; INTRAVENOUS ONCE
Status: COMPLETED | OUTPATIENT
Start: 2025-02-17 | End: 2025-02-17

## 2025-02-17 RX ORDER — INDOMETHACIN 50 MG/1
50 CAPSULE ORAL 3 TIMES DAILY PRN
Qty: 15 CAPSULE | Refills: 0 | Status: SHIPPED | OUTPATIENT
Start: 2025-02-17 | End: 2025-02-22

## 2025-02-17 RX ORDER — HYDROCODONE BITARTRATE AND ACETAMINOPHEN 5; 325 MG/1; MG/1
1 TABLET ORAL ONCE
Status: COMPLETED | OUTPATIENT
Start: 2025-02-17 | End: 2025-02-17

## 2025-02-17 RX ORDER — HYDROCODONE BITARTRATE AND ACETAMINOPHEN 7.5; 325 MG/1; MG/1
1 TABLET ORAL EVERY 6 HOURS PRN
Qty: 20 TABLET | Refills: 0 | Status: SHIPPED | OUTPATIENT
Start: 2025-02-17

## 2025-02-17 RX ADMIN — HYDROCODONE BITARTRATE AND ACETAMINOPHEN 1 TABLET: 5; 325 TABLET ORAL at 19:35

## 2025-02-17 RX ADMIN — KETOROLAC TROMETHAMINE 30 MG: 30 INJECTION, SOLUTION INTRAMUSCULAR; INTRAVENOUS at 21:40

## 2025-02-17 NOTE — Clinical Note
[facility] EMERGENCY ROOM  [address]  [place] [address]  Phone: [phone]  Fax: [phone]    Keivn Bradshaw was seen and treated in our emergency department on 2/17/2025.  He may return to work on 02/19/2025.         Thank you for choosing [facility].    Kevin Bautista, DO

## 2025-02-17 NOTE — Clinical Note
[facility] EMERGENCY ROOM  [address] [place] [address]  Phone: [phone]  Fax: [phone]    Kevin Bradshaw was seen and treated in our emergency department on 2/17/2025.  He may return to work on 02/19/2025.  May return to work with no restrictions       Thank you for choosing [facility].    Kevin Bautista, DO

## 2025-02-17 NOTE — Clinical Note
[facility] EMERGENCY ROOM  [address]  [place] [address]  Phone: [phone]  Fax: [phone]    Kevin Bradshaw was seen and treated in our emergency department on 2/17/2025.  He may return to work on 02/19/2025.         Thank you for choosing [facility].    Kevin Bautista, DO

## 2025-02-18 NOTE — ED PROVIDER NOTES
Time: 7:25 PM EST  Date of encounter:  2/17/2025  Independent Historian/Clinical History and Information was obtained by:   Patient    History is limited by: N/A    Chief Complaint: Left elbow pain      History of Present Illness:  Patient is a 54 y.o. year old male who presents to the emergency department for evaluation of right upper extremity pain x 2 days.  Patient reports he has had similar thing happen seminary 5 to 6 to 9 months ago.  Patient has been seen by orthopedics previously.  Patient is he received an injection in the elbow.  Patient states that today the pain and swelling has migrated down from the elbow towards his wrist area.  Patient is its painful to move.  He denies any trauma or injury.  He denies any wounds or redness.      Patient Care Team  Primary Care Provider: Jennifer Hobbs APRN    Past Medical History:     Allergies   Allergen Reactions    Other GI Intolerance     Onions, peppers     Past Medical History:   Diagnosis Date    Allergies     Anxiety 08/17/2021    Degeneration of lumbar intervertebral disc 02/05/2016    Fatigue 02/02/2023    GERD (gastroesophageal reflux disease)     Gout     High blood pressure     Hypertension     Impaired fasting glucose 03/15/2022    Male hypogonadism 07/28/2021    Mid back pain 02/24/2016    Obstructive sleep apnea 07/28/2021    Polycythemia, secondary 03/15/2022    Related to ETOH abuse     Pulmonary embolism     Tendinitis of elbow 07/16/2021    Thoracic back pain     Vitamin D deficiency 07/28/2021     Past Surgical History:   Procedure Laterality Date    CHOLECYSTECTOMY      GALLBLADDER SURGERY      HEMORRHOIDECTOMY      LUMBAR EPIDURAL INJECTION  2015    TONSILLECTOMY       Family History   Problem Relation Age of Onset    Hypertension Mother     Hypertension Father        Home Medications:  Prior to Admission medications    Medication Sig Start Date End Date Taking? Authorizing Provider   lisinopril (PRINIVIL,ZESTRIL) 40 MG tablet TAKE 1  TABLET BY MOUTH DAILY 1/8/25   Jennifer Hobbs APRN   allopurinol (ZYLOPRIM) 100 MG tablet Take 1 tablet by mouth Daily. 12/18/24   Jennifer Hobbs APRN   apixaban (Eliquis) 5 MG tablet tablet Take 1 tablet by mouth Every 12 (Twelve) Hours. 12/18/24   Jennifer Hobbs APRN   atenolol (TENORMIN) 25 MG tablet Take 1 tablet by mouth 2 (Two) Times a Day. 12/18/24   Jennifer Hobbs APRN   cyclobenzaprine (FLEXERIL) 10 MG tablet Take 1 tablet by mouth 2 (Two) Times a Day As Needed for Muscle Spasms. 12/18/24   Jennifer Hobbs APRN   fluticasone (Flonase) 50 MCG/ACT nasal spray Administer 2 sprays into the nostril(s) as directed by provider Daily. 12/18/24   Jennifer Hobbs APRN   hydroCHLOROthiazide 50 MG tablet Take 1 tablet by mouth Daily. 12/18/24   Jennifer Hobbs APRN   pantoprazole (PROTONIX) 40 MG EC tablet Take 1 tablet by mouth Daily. 12/18/24   Jennifer Hobbs APRN   vitamin D (ERGOCALCIFEROL) 1.25 MG (40298 UT) capsule capsule Take 1 capsule by mouth 1 (One) Time Per Week. Take with a meal. 12/18/24   Jennifer Hobbs APRN        Social History:   Social History     Tobacco Use    Smoking status: Never     Passive exposure: Never    Smokeless tobacco: Current     Types: Chew   Vaping Use    Vaping status: Never Used   Substance Use Topics    Alcohol use: Yes     Alcohol/week: 28.0 standard drinks of alcohol     Types: 28 Standard drinks or equivalent per week     Comment: occais    Drug use: Never         Review of Systems:  Review of Systems   Constitutional:  Negative for chills and fever.   HENT:  Negative for congestion, ear pain and sore throat.    Eyes:  Negative for pain.   Respiratory:  Negative for cough, chest tightness and shortness of breath.    Cardiovascular:  Negative for chest pain.   Gastrointestinal:  Negative for abdominal pain, diarrhea, nausea and vomiting.   Genitourinary:  Negative for flank pain and hematuria.   Musculoskeletal:  Positive for arthralgias and myalgias.  "Negative for joint swelling.   Skin:  Negative for pallor.   Neurological:  Negative for seizures and headaches.   All other systems reviewed and are negative.       Physical Exam:  /53   Pulse 72   Temp 98 °F (36.7 °C)   Resp 18   Ht 172.7 cm (68\")   Wt (!) 150 kg (330 lb 12.8 oz)   SpO2 96%   BMI 50.30 kg/m²   Vital signs were reviewed under triage note.  General appearance - Patient appears well-developed and well-nourished.  Patient is in no acute distress.  Head - Normocephalic, atraumatic.  Pupils - Equal, round, reactive to light.  Extraocular muscles are intact.  Conjunctiva is clear.  Nasal - Normal inspection.  No evidence of trauma or epistaxis.  Tympanic membranes - Gray, intact without erythema or retractions.  Oral mucosa - Pink and moist without lesions or erythema.  Uvula is midline.  Chest wall - Atraumatic.  Chest wall is nontender.  There are no vesicular rashes noted.  Neck - Supple.  Trachea was midline.  There is no palpable lymphadenopathy or thyromegaly.  There are no meningeal signs  Lungs - Clear to auscultation and percussion bilaterally.  Heart - Regular rate and rhythm without any murmurs, clicks, or gallops.  Abdomen - Soft.  Bowel sounds are present.  There is no palpable tenderness.  There is no rebound, guarding, or rigidity.  There are no palpable masses.  There are no pulsatile masses.  Back - Spine is straight and midline.  There is no CVA tenderness.  Extremities - Intact x4.  Patient does have some diffuse swelling from the right wrist through the right elbow.  There is no redness or warmth.  Patient does have limited range of motion of the elbow due to pain and swelling.  There is no olecranon bursitis.  Patient seems to have more localized point tenderness to his lateral epicondyle region..  There is no palpable edema.  Pulses are intact x4 and equal.  Neurologic - Patient is awake, alert, and oriented x3.  Cranial nerves II through XII are grossly intact.  Motor " and sensory functions grossly intact.  Cerebellar function was normal.  Integument - There are no rashes.  There are no petechia or purpura lesions noted.  There are no vesicular lesions noted.          Medical Decision Making:      Comorbidities that affect care:    Gout, pulmonary embolism, hypertension, sleep apnea, GERD, degenerative disc disease, vitamin D deficiency    External Notes reviewed:    Previous Clinic Note: Office visit with Dr. Bowman on 9/3/2024 discussing right lateral epicondylitis.      The following orders were placed and all results were independently analyzed by me:  Orders Placed This Encounter   Procedures    XR Humerus Right    XR Forearm 2 View Right    Comprehensive Metabolic Panel    Uric Acid    CBC Auto Differential    CBC & Differential    Extra Tubes    Gold Top - SST    Extra Tubes    Light Blue Top       Medications Given in the Emergency Department:  Medications   HYDROcodone-acetaminophen (NORCO) 5-325 MG per tablet 1 tablet (1 tablet Oral Given 2/17/25 1935)   ketorolac (TORADOL) injection 30 mg (30 mg Intravenous Given 2/17/25 2140)        ED Course:    ED Course as of 02/18/25 2113 Mon Feb 17, 2025 1928 --- PROVIDER IN TRIAGE NOTE ---    The patient was evaluated by Yousuf carias in triage. Orders were placed and the patient is currently awaiting disposition.  [CB]      ED Course User Index  [CB] Yousuf Boswell, PAMARÍA     The patient was seen and evaluated in the ED by me.  The above history and physical examination was performed as documented.  Diagnostic data was obtained.  Results reviewed.  Findings were discussed with the patient and his wife.  I reviewed the patient's prior records.  Orthopedics is diagnosed with lateral epicondylitis.  I would raise the question of this could be more of a gout attack.  I recommend he discuss with his orthopedic surgeon about possibly having a joint aspiration to check for gout crystals.  Patient is already on allopurinol.   Patient replaced on NSAIDs.  Recommend patient follow with orthopedic surgery.  Patient also begin some hydrocodone for breakthrough pain.  Patient be given a work note for tomorrow.  Patient stable for discharge home.      Labs:    Lab Results (last 24 hours)       Procedure Component Value Units Date/Time    CBC & Differential [[id number]]  (Abnormal) Collected: 02/17/25 2130    Specimen: Blood Updated: 02/17/25 2139    Narrative:      The following orders were created for panel order CBC & Differential.  Procedure                               Abnormality         Status                     ---------                               -----------         ------                     CBC Auto Differential[[id number]]        Abnormal            Final result                 Please view results for these tests on the individual orders.    Comprehensive Metabolic Panel [[id number]]  (Abnormal) Collected: 02/17/25 2130    Specimen: Blood Updated: 02/17/25 2200     Glucose 120 mg/dL      BUN 9 mg/dL      Creatinine 0.82 mg/dL      Sodium 137 mmol/L      Potassium 3.2 mmol/L      Chloride 98 mmol/L      CO2 27.0 mmol/L      Calcium 9.0 mg/dL      Total Protein 6.8 g/dL      Albumin 3.5 g/dL      ALT (SGPT) 28 U/L      AST (SGOT) 29 U/L      Alkaline Phosphatase 58 U/L      Total Bilirubin 1.1 mg/dL      Globulin 3.3 gm/dL      A/G Ratio 1.1 g/dL      BUN/Creatinine Ratio 11.0     Anion Gap 12.0 mmol/L      eGFR 105.0 mL/min/1.73     Narrative:      GFR Categories in Chronic Kidney Disease (CKD)      GFR Category          GFR (mL/min/1.73)    Interpretation  G1                     90 or greater         Normal or high (1)  G2                      60-89                Mild decrease (1)  G3a                   45-59                Mild to moderate decrease  G3b                   30-44                Moderate to severe decrease  G4                    15-29                Severe decrease  G5                    14 or less           Kidney  failure          (1)In the absence of evidence of kidney disease, neither GFR category G1 or G2 fulfill the criteria for CKD.    eGFR calculation 2021 CKD-EPI creatinine equation, which does not include race as a factor    Uric Acid [[id number]]  (Abnormal) Collected: 02/17/25 2130    Specimen: Blood Updated: 02/17/25 2200     Uric Acid 7.9 mg/dL     CBC Auto Differential [[id number]]  (Abnormal) Collected: 02/17/25 2130    Specimen: Blood Updated: 02/17/25 2139     WBC 12.80 10*3/mm3      RBC 5.39 10*6/mm3      Hemoglobin 16.1 g/dL      Hematocrit 48.7 %      MCV 90.4 fL      MCH 29.9 pg      MCHC 33.1 g/dL      RDW 13.3 %      RDW-SD 43.8 fl      MPV 9.4 fL      Platelets 327 10*3/mm3      Neutrophil % 72.0 %      Lymphocyte % 15.2 %      Monocyte % 10.4 %      Eosinophil % 1.3 %      Basophil % 0.6 %      Immature Grans % 0.5 %      Neutrophils, Absolute 9.22 10*3/mm3      Lymphocytes, Absolute 1.95 10*3/mm3      Monocytes, Absolute 1.33 10*3/mm3      Eosinophils, Absolute 0.16 10*3/mm3      Basophils, Absolute 0.08 10*3/mm3      Immature Grans, Absolute 0.06 10*3/mm3      nRBC 0.0 /100 WBC              Imaging:    No Radiology Exams Resulted Within Past 24 Hours      Differential Diagnosis and Discussion:    Extremity Pain: Differential diagnosis includes but is not limited to soft tissue sprain, tendonitis, tendon injury, dislocation, fracture, deep vein thrombosis, arterial insufficiency, osteoarthritis, bursitis, and ligamentous damage.    PROCEDURES:    Labs were collected in the emergency department and all labs were reviewed and interpreted by me.  X-ray were performed in the emergency department and all X-ray impressions were independently interpreted by me.  Venous duplex scan of the right upper extremity results were independently interpreted by me.    No orders to display       Procedures    MDM     Amount and/or Complexity of Data Reviewed  Clinical lab tests: reviewed  Tests in the radiology section of  CPT®: reviewed                       Patient Care Considerations:    ANTIBIOTICS: I considered prescribing antibiotics as an outpatient however no bacterial focus of infection was found.      Consultants/Shared Management Plan:    None    Social Determinants of Health:    Patient is independent, reliable, and has access to care.       Disposition and Care Coordination:    Discharged: I considered escalation of care by admitting this patient to the hospital, however there were no acute findings to warrant acute hospitalization    I have explained the patient´s condition, diagnoses and treatment plan based on the information available to me at this time. I have answered questions and addressed any concerns. The patient has a good  understanding of the patient´s diagnosis, condition, and treatment plan as can be expected at this point. The vital signs have been stable. The patient´s condition is stable and appropriate for discharge from the emergency department.      The patient will pursue further outpatient evaluation with the primary care physician or other designated or consulting physician as outlined in the discharge instructions. They are agreeable to this plan of care and follow-up instructions have been explained in detail. The patient has received these instructions in written format and has expressed an understanding of the discharge instructions. The patient is aware that any significant change in condition or worsening of symptoms should prompt an immediate return to this or the closest emergency department or call to 911.  I have explained discharge medications and the need for follow up with the patient/caretakers. This was also printed in the discharge instructions. Patient was discharged with the following medications and follow up:      Medication List        New Prescriptions      HYDROcodone-acetaminophen 7.5-325 MG per tablet  Commonly known as: NORCO  Take 1 tablet by mouth Every 6 (Six) Hours As  Needed for Moderate Pain.     indomethacin 50 MG capsule  Commonly known as: INDOCIN  Take 1 capsule by mouth 3 (Three) Times a Day As Needed for Mild Pain for up to 5 days. Take with food.               Where to Get Your Medications        These medications were sent to [facility]/pharmacy #[id number] - [place], [place] - [address] - [phone]  - [phone] FX  [address] [place] [address]      Hours: 24-hours Phone: [phone]   HYDROcodone-acetaminophen 7.5-325 MG per tablet  indomethacin 50 MG capsule      Cm Rosenthal MD  [address]  [place] [id number] [phone]    Schedule an appointment as soon as possible for a visit          Final diagnoses:   Diffuse pain in right upper extremity        ED Disposition       ED Disposition   Discharge    Condition   Stable    Comment   --               This medical record created using voice recognition software.             Kevin Bautista DO  02/18/25 [id number]

## 2025-02-18 NOTE — DISCHARGE INSTRUCTIONS
Avoid any strenuous activities with your right upper extremity until the pain is resolved.  Apply warm and/or cold compresses for comfort.  Apply for 20 to 30 minutes 3-4 times per day.  Take the prescriptions as directed.  Stop taking indomethacin once the pain has subsided.  Consider taking a stool softener if you are taking the hydrocodone as this will cause constipation.  Follow-up with your orthopedic surgeon to discuss further evaluation and treatment.  Return to the ER for fever greater than 101, increasing pain, increasing redness, or any other concerns issues that may arise.

## 2025-02-25 ENCOUNTER — OFFICE VISIT (OUTPATIENT)
Dept: ORTHOPEDIC SURGERY | Facility: CLINIC | Age: 55
End: 2025-02-25
Payer: COMMERCIAL

## 2025-02-25 VITALS
HEIGHT: 68 IN | HEART RATE: 71 BPM | BODY MASS INDEX: 47.74 KG/M2 | DIASTOLIC BLOOD PRESSURE: 89 MMHG | OXYGEN SATURATION: 96 % | SYSTOLIC BLOOD PRESSURE: 141 MMHG | WEIGHT: 315 LBS

## 2025-02-25 DIAGNOSIS — M25.521 RIGHT ELBOW PAIN: Primary | ICD-10-CM

## 2025-02-25 DIAGNOSIS — M77.11 LATERAL EPICONDYLITIS OF RIGHT ELBOW: ICD-10-CM

## 2025-02-25 NOTE — PROGRESS NOTES
"Chief Complaint  Initial Evaluation of the Right Elbow     Home Bradshaw presents to [facility] for initial evaluation of the right elbow.  He is having pain in the right elbow.  He has went to the ER last week due to pain in the right elbow.  The ER thought it was gout.  He had X rays and ultrasound on 2/17/25.  He has had injection in the past for lateral epicondylitis.      Allergies   Allergen Reactions    Other GI Intolerance     Onions, peppers        Social History     Socioeconomic History    Marital status:    Tobacco Use    Smoking status: Never     Passive exposure: Never    Smokeless tobacco: Current     Types: Chew   Vaping Use    Vaping status: Never Used   Substance and Sexual Activity    Alcohol use: Yes     Alcohol/week: 28.0 standard drinks of alcohol     Types: 28 Standard drinks or equivalent per week     Comment: occais    Drug use: Never    Sexual activity: Defer        I reviewed the patient's chief complaint, history of present illness, review of systems, past medical history, surgical history, family history, social history, medications, and allergy list.     Review of Systems     Constitutional: Denies fevers, chills, weight loss  Cardiovascular: Denies chest pain, shortness of breath  Skin: Denies rashes, acute skin changes  Neurologic: Denies headache, loss of consciousness        Vital Signs:   /89   Pulse 71   Ht 172.7 cm (68\")   Wt (!) 150 kg (330 lb)   SpO2 96%   BMI 50.18 kg/m²          Physical Exam  General: Alert. No acute distress    Ortho Exam        RIGHT ELBOW Tender lateral epicondyle. Non-tender medial epicondyle. Non-tender radial head. Sensation to light touch median, radial, ulnar nerve. Positive AIN, PIN, ulnar nerve motor function. Axillary sensation intact. Elbow ROM 0-140. Positive Tinel's at the elbow. Pain with resisted wrist and long finger extension.        Procedures      Imaging Results (Most " Recent)       None             Result Review :         Duplex Venous Upper Extremity - Right CAR    Result Date: 2/17/2025  Narrative:   Normal right upper extremity venous duplex scan.     XR Humerus Right    Result Date: 2/17/2025  Narrative: XR HUMERUS RIGHT Date of Exam: 2/17/2025 6:59 PM EST Indication: arm pain Comparison: None available. Findings: No fractures or acute osseous abnormalities are identified in the right humerus. There is ossification adjacent to the right humeral head suggesting calcific tendinitis of the rotator cuff.     Impression: Impression: No osseous abnormalities are identified in the right humerus. Ossification adjacent to the right humeral head suggesting calcific tendinitis of the rotator cuff. Electronically Signed: Hao Early MD  2/17/2025 7:32 PM EST  Workstation ID: [id number]    XR Forearm 2 View Right    Result Date: 2/17/2025  Narrative: XR FOREARM 2 VW RIGHT Date of Exam: 2/17/2025 6:59 PM EST Indication: arm pain Comparison: Right elbow series 7/10/2024 Findings: Degenerative changes are present in the right elbow. No fractures, dislocations or acute osseous abnormalities are identified. There is mild generalized soft tissue swelling.     Impression: Impression: Degenerative change. Mild generalized soft tissue swelling. Electronically Signed: Hao Early MD  2/17/2025 7:28 PM EST  Workstation ID: [id number]            Assessment and Plan     Diagnoses and all orders for this visit:    1. Right elbow pain (Primary)    2. Lateral epicondylitis of right elbow        Discussed the treatment plan with the patient. I reviewed the X-rays that were obtained 2/17/25 with the patient.     Discussed injection of the right elbow.  He denied at this time      He is on a blood thinner.  Continue with topical cream.  Prescribed different anti inflammatory topical.      Continue HEP exercises.     HEP exercises given.      Call or return if worsening symptoms.    Follow Up      PRN      Patient was given instructions and counseling regarding his condition or for health maintenance advice. Please see specific information pulled into the AVS if appropriate.     Scribed for Cm Rosenthal MD by Andreina Morales MA.  02/25/25   10:52 EST    I have personally performed the services described in this document as scribed by the above individual and it is both accurate and complete. Cm Rosenthal MD 02/25/25

## 2025-03-16 NOTE — Clinical Note
[facility] EMERGENCY ROOM  [address]  [place] [address]  Phone: [phone]    Kevin Bradshaw was seen and treated in our emergency department on 8/22/2023.  He may return to work on 08/23/2023.         Thank you for choosing [facility].    aPolo Gore MD      
Baby Boy Guillen born at 40.2 via urgent primary C/S for NRFHT to a 31yo  O+, Hep B neg, HIV NR, RPR NR, Rubella Imm, GBS negative mother. Maternal hx of cerebral venous sinus stenosis s/p stents, PCOS, juvenile arthritis, and hypothyroidism on synthroid. IVF pregnancy. No prenatal complications. Labor c/w NRFHT. AROM light meconium stained amniotic fluid 2 hours prior to delivery. Highest maternal temp 36.9. Baby emerged vigorous and crying. Warmed, dried, suctioned, stimulated. Apgar 8/9. Admit to  nursery. EOS 0.07

## 2025-03-19 RX ORDER — CYCLOBENZAPRINE HCL 10 MG
10 TABLET ORAL 2 TIMES DAILY PRN
Qty: 30 TABLET | Refills: 1 | Status: SHIPPED | OUTPATIENT
Start: 2025-03-19

## 2025-04-28 ENCOUNTER — APPOINTMENT (OUTPATIENT)
Dept: CT IMAGING | Facility: HOSPITAL | Age: 55
End: 2025-04-28
Payer: COMMERCIAL

## 2025-04-28 ENCOUNTER — APPOINTMENT (OUTPATIENT)
Dept: GENERAL RADIOLOGY | Facility: HOSPITAL | Age: 55
End: 2025-04-28
Payer: COMMERCIAL

## 2025-04-28 ENCOUNTER — HOSPITAL ENCOUNTER (OUTPATIENT)
Facility: HOSPITAL | Age: 55
Setting detail: OBSERVATION
Discharge: HOME | End: 2025-04-29
Attending: EMERGENCY MEDICINE | Admitting: INTERNAL MEDICINE
Payer: COMMERCIAL

## 2025-04-28 ENCOUNTER — APPOINTMENT (OUTPATIENT)
Dept: CARDIOLOGY | Facility: HOSPITAL | Age: 55
End: 2025-04-28
Payer: COMMERCIAL

## 2025-04-28 DIAGNOSIS — I51.7 LEFT ATRIAL DILATATION: ICD-10-CM

## 2025-04-28 DIAGNOSIS — J96.01 ACUTE RESPIRATORY FAILURE WITH HYPOXIA: Primary | ICD-10-CM

## 2025-04-28 DIAGNOSIS — G47.33 OBSTRUCTIVE SLEEP APNEA: ICD-10-CM

## 2025-04-28 DIAGNOSIS — R06.02 SHORTNESS OF BREATH: ICD-10-CM

## 2025-04-28 DIAGNOSIS — I50.32 DIASTOLIC CHF, CHRONIC: ICD-10-CM

## 2025-04-28 DIAGNOSIS — R06.09 DYSPNEA ON EXERTION: ICD-10-CM

## 2025-04-28 LAB
ALBUMIN SERPL-MCNC: 3.4 G/DL (ref 3.5–5.2)
ALBUMIN/GLOB SERPL: 1.1 G/DL
ALP SERPL-CCNC: 55 U/L (ref 39–117)
ALT SERPL W P-5'-P-CCNC: 21 U/L (ref 1–41)
ANION GAP SERPL CALCULATED.3IONS-SCNC: 14.5 MMOL/L (ref 5–15)
AST SERPL-CCNC: 27 U/L (ref 1–40)
B PARAPERT DNA SPEC QL NAA+PROBE: NOT DETECTED
B PERT DNA SPEC QL NAA+PROBE: NOT DETECTED
BACTERIA UR QL AUTO: ABNORMAL /HPF
BASOPHILS # BLD AUTO: 0.05 10*3/MM3 (ref 0–0.2)
BASOPHILS NFR BLD AUTO: 0.8 % (ref 0–1.5)
BILIRUB SERPL-MCNC: 0.3 MG/DL (ref 0–1.2)
BILIRUB UR QL STRIP: NEGATIVE
BUN SERPL-MCNC: 32 MG/DL (ref 6–20)
BUN/CREAT SERPL: 22.9 (ref 7–25)
C PNEUM DNA NPH QL NAA+NON-PROBE: NOT DETECTED
CALCIUM SPEC-SCNC: 8.4 MG/DL (ref 8.6–10.5)
CHLORIDE SERPL-SCNC: 97 MMOL/L (ref 98–107)
CLARITY UR: CLEAR
CO2 SERPL-SCNC: 18.5 MMOL/L (ref 22–29)
COLOR UR: YELLOW
CREAT SERPL-MCNC: 1.4 MG/DL (ref 0.76–1.27)
CRP SERPL-MCNC: 2.21 MG/DL (ref 0–0.5)
D-LACTATE SERPL-SCNC: 1.1 MMOL/L (ref 0.5–2)
DEPRECATED RDW RBC AUTO: 43 FL (ref 37–54)
EGFRCR SERPLBLD CKD-EPI 2021: 60.1 ML/MIN/1.73
EOSINOPHIL # BLD AUTO: 0.3 10*3/MM3 (ref 0–0.4)
EOSINOPHIL NFR BLD AUTO: 4.6 % (ref 0.3–6.2)
ERYTHROCYTE [DISTWIDTH] IN BLOOD BY AUTOMATED COUNT: 13 % (ref 12.3–15.4)
ERYTHROCYTE [SEDIMENTATION RATE] IN BLOOD: 16 MM/HR (ref 0–20)
FLUAV SUBTYP SPEC NAA+PROBE: NOT DETECTED
FLUBV RNA ISLT QL NAA+PROBE: NOT DETECTED
GEN 5 1HR TROPONIN T REFLEX: 19 NG/L
GLOBULIN UR ELPH-MCNC: 3 GM/DL
GLUCOSE SERPL-MCNC: 115 MG/DL (ref 65–99)
GLUCOSE UR STRIP-MCNC: NEGATIVE MG/DL
HADV DNA SPEC NAA+PROBE: NOT DETECTED
HCOV 229E RNA SPEC QL NAA+PROBE: NOT DETECTED
HCOV HKU1 RNA SPEC QL NAA+PROBE: NOT DETECTED
HCOV NL63 RNA SPEC QL NAA+PROBE: NOT DETECTED
HCOV OC43 RNA SPEC QL NAA+PROBE: NOT DETECTED
HCT VFR BLD AUTO: 46.1 % (ref 37.5–51)
HGB BLD-MCNC: 15.5 G/DL (ref 13–17.7)
HGB UR QL STRIP.AUTO: ABNORMAL
HMPV RNA NPH QL NAA+NON-PROBE: NOT DETECTED
HOLD SPECIMEN: NORMAL
HOLD SPECIMEN: NORMAL
HPIV1 RNA ISLT QL NAA+PROBE: NOT DETECTED
HPIV2 RNA SPEC QL NAA+PROBE: NOT DETECTED
HPIV3 RNA NPH QL NAA+PROBE: NOT DETECTED
HPIV4 P GENE NPH QL NAA+PROBE: NOT DETECTED
HYALINE CASTS UR QL AUTO: ABNORMAL /LPF
IMM GRANULOCYTES # BLD AUTO: 0.05 10*3/MM3 (ref 0–0.05)
IMM GRANULOCYTES NFR BLD AUTO: 0.8 % (ref 0–0.5)
KETONES UR QL STRIP: ABNORMAL
LEUKOCYTE ESTERASE UR QL STRIP.AUTO: ABNORMAL
LYMPHOCYTES # BLD AUTO: 1.39 10*3/MM3 (ref 0.7–3.1)
LYMPHOCYTES NFR BLD AUTO: 21.5 % (ref 19.6–45.3)
M PNEUMO IGG SER IA-ACNC: NOT DETECTED
MAGNESIUM SERPL-MCNC: 1.7 MG/DL (ref 1.6–2.6)
MCH RBC QN AUTO: 30.7 PG (ref 26.6–33)
MCHC RBC AUTO-ENTMCNC: 33.6 G/DL (ref 31.5–35.7)
MCV RBC AUTO: 91.3 FL (ref 79–97)
MONOCYTES # BLD AUTO: 1.24 10*3/MM3 (ref 0.1–0.9)
MONOCYTES NFR BLD AUTO: 19.1 % (ref 5–12)
NEUTROPHILS NFR BLD AUTO: 3.45 10*3/MM3 (ref 1.7–7)
NEUTROPHILS NFR BLD AUTO: 53.2 % (ref 42.7–76)
NITRITE UR QL STRIP: NEGATIVE
NRBC BLD AUTO-RTO: 0 /100 WBC (ref 0–0.2)
NT-PROBNP SERPL-MCNC: 565.6 PG/ML (ref 0–900)
PH UR STRIP.AUTO: <=5 [PH] (ref 5–8)
PLATELET # BLD AUTO: 251 10*3/MM3 (ref 140–450)
PMV BLD AUTO: 9.8 FL (ref 6–12)
POTASSIUM SERPL-SCNC: 3.9 MMOL/L (ref 3.5–5.2)
PROT SERPL-MCNC: 6.4 G/DL (ref 6–8.5)
PROT UR QL STRIP: ABNORMAL
RBC # BLD AUTO: 5.05 10*6/MM3 (ref 4.14–5.8)
RBC # UR STRIP: ABNORMAL /HPF
REF LAB TEST METHOD: ABNORMAL
RHINOVIRUS RNA SPEC NAA+PROBE: NOT DETECTED
RSV RNA NPH QL NAA+NON-PROBE: NOT DETECTED
SARS-COV-2 RNA RESP QL NAA+PROBE: NOT DETECTED
SODIUM SERPL-SCNC: 130 MMOL/L (ref 136–145)
SP GR UR STRIP: >1.03 (ref 1–1.03)
SQUAMOUS #/AREA URNS HPF: ABNORMAL /HPF
T4 FREE SERPL-MCNC: 1.19 NG/DL (ref 0.92–1.68)
TROPONIN T NUMERIC DELTA: 0 NG/L
TROPONIN T SERPL HS-MCNC: 19 NG/L
TSH SERPL DL<=0.05 MIU/L-ACNC: 5.46 UIU/ML (ref 0.27–4.2)
UROBILINOGEN UR QL STRIP: ABNORMAL
WBC # UR STRIP: ABNORMAL /HPF
WBC NRBC COR # BLD AUTO: 6.48 10*3/MM3 (ref 3.4–10.8)
WHOLE BLOOD HOLD COAG: NORMAL
WHOLE BLOOD HOLD SPECIMEN: NORMAL

## 2025-04-28 PROCEDURE — 94799 UNLISTED PULMONARY SVC/PX: CPT

## 2025-04-28 PROCEDURE — 96374 THER/PROPH/DIAG INJ IV PUSH: CPT

## 2025-04-28 PROCEDURE — 83605 ASSAY OF LACTIC ACID: CPT | Performed by: EMERGENCY MEDICINE

## 2025-04-28 PROCEDURE — 99204 OFFICE O/P NEW MOD 45 MIN: CPT | Performed by: STUDENT IN AN ORGANIZED HEALTH CARE EDUCATION/TRAINING PROGRAM

## 2025-04-28 PROCEDURE — 99285 EMERGENCY DEPT VISIT HI MDM: CPT

## 2025-04-28 PROCEDURE — 86140 C-REACTIVE PROTEIN: CPT

## 2025-04-28 PROCEDURE — 25510000001 IOPAMIDOL PER 1 ML: Performed by: EMERGENCY MEDICINE

## 2025-04-28 PROCEDURE — 81001 URINALYSIS AUTO W/SCOPE: CPT | Performed by: EMERGENCY MEDICINE

## 2025-04-28 PROCEDURE — 36415 COLL VENOUS BLD VENIPUNCTURE: CPT | Performed by: EMERGENCY MEDICINE

## 2025-04-28 PROCEDURE — 94640 AIRWAY INHALATION TREATMENT: CPT

## 2025-04-28 PROCEDURE — 94664 DEMO&/EVAL PT USE INHALER: CPT

## 2025-04-28 PROCEDURE — 0202U NFCT DS 22 TRGT SARS-COV-2: CPT

## 2025-04-28 PROCEDURE — 94761 N-INVAS EAR/PLS OXIMETRY MLT: CPT

## 2025-04-28 PROCEDURE — G0378 HOSPITAL OBSERVATION PER HR: HCPCS

## 2025-04-28 PROCEDURE — 93306 TTE W/DOPPLER COMPLETE: CPT

## 2025-04-28 PROCEDURE — 82785 ASSAY OF IGE: CPT

## 2025-04-28 PROCEDURE — 25010000002 KETOROLAC TROMETHAMINE PER 15 MG: Performed by: EMERGENCY MEDICINE

## 2025-04-28 PROCEDURE — 25510000001 PERFLUTREN 6.52 MG/ML SUSPENSION 2 ML VIAL: Performed by: INTERNAL MEDICINE

## 2025-04-28 PROCEDURE — 84484 ASSAY OF TROPONIN QUANT: CPT | Performed by: EMERGENCY MEDICINE

## 2025-04-28 PROCEDURE — 93010 ELECTROCARDIOGRAM REPORT: CPT | Performed by: INTERNAL MEDICINE

## 2025-04-28 PROCEDURE — 85652 RBC SED RATE AUTOMATED: CPT

## 2025-04-28 PROCEDURE — 93306 TTE W/DOPPLER COMPLETE: CPT | Performed by: INTERNAL MEDICINE

## 2025-04-28 PROCEDURE — 25810000003 SODIUM CHLORIDE 0.9 % SOLUTION: Performed by: EMERGENCY MEDICINE

## 2025-04-28 PROCEDURE — 93005 ELECTROCARDIOGRAM TRACING: CPT | Performed by: EMERGENCY MEDICINE

## 2025-04-28 PROCEDURE — 99222 1ST HOSP IP/OBS MODERATE 55: CPT | Performed by: INTERNAL MEDICINE

## 2025-04-28 PROCEDURE — 83880 ASSAY OF NATRIURETIC PEPTIDE: CPT | Performed by: EMERGENCY MEDICINE

## 2025-04-28 PROCEDURE — 71275 CT ANGIOGRAPHY CHEST: CPT

## 2025-04-28 PROCEDURE — 80050 GENERAL HEALTH PANEL: CPT

## 2025-04-28 PROCEDURE — 71045 X-RAY EXAM CHEST 1 VIEW: CPT

## 2025-04-28 PROCEDURE — 84439 ASSAY OF FREE THYROXINE: CPT | Performed by: EMERGENCY MEDICINE

## 2025-04-28 PROCEDURE — 83735 ASSAY OF MAGNESIUM: CPT | Performed by: EMERGENCY MEDICINE

## 2025-04-28 PROCEDURE — 93005 ELECTROCARDIOGRAM TRACING: CPT

## 2025-04-28 RX ORDER — PANTOPRAZOLE SODIUM 40 MG/1
40 TABLET, DELAYED RELEASE ORAL DAILY
Status: DISCONTINUED | OUTPATIENT
Start: 2025-04-29 | End: 2025-04-29 | Stop reason: HOSPADM

## 2025-04-28 RX ORDER — POLYETHYLENE GLYCOL 3350 17 G/17G
17 POWDER, FOR SOLUTION ORAL DAILY PRN
Status: DISCONTINUED | OUTPATIENT
Start: 2025-04-28 | End: 2025-04-29 | Stop reason: HOSPADM

## 2025-04-28 RX ORDER — SODIUM CHLORIDE 0.9 % (FLUSH) 0.9 %
10 SYRINGE (ML) INJECTION AS NEEDED
Status: DISCONTINUED | OUTPATIENT
Start: 2025-04-28 | End: 2025-04-29 | Stop reason: HOSPADM

## 2025-04-28 RX ORDER — AMOXICILLIN 250 MG
2 CAPSULE ORAL 2 TIMES DAILY PRN
Status: DISCONTINUED | OUTPATIENT
Start: 2025-04-28 | End: 2025-04-29 | Stop reason: HOSPADM

## 2025-04-28 RX ORDER — BUDESONIDE 0.5 MG/2ML
0.5 INHALANT ORAL
Status: DISCONTINUED | OUTPATIENT
Start: 2025-04-28 | End: 2025-04-29 | Stop reason: HOSPADM

## 2025-04-28 RX ORDER — IOPAMIDOL 755 MG/ML
100 INJECTION, SOLUTION INTRAVASCULAR
Status: COMPLETED | OUTPATIENT
Start: 2025-04-28 | End: 2025-04-28

## 2025-04-28 RX ORDER — ONDANSETRON 2 MG/ML
4 INJECTION INTRAMUSCULAR; INTRAVENOUS EVERY 6 HOURS PRN
Status: DISCONTINUED | OUTPATIENT
Start: 2025-04-28 | End: 2025-04-29 | Stop reason: HOSPADM

## 2025-04-28 RX ORDER — SODIUM CHLORIDE 0.9 % (FLUSH) 0.9 %
10 SYRINGE (ML) INJECTION EVERY 12 HOURS SCHEDULED
Status: DISCONTINUED | OUTPATIENT
Start: 2025-04-28 | End: 2025-04-29 | Stop reason: HOSPADM

## 2025-04-28 RX ORDER — CYCLOBENZAPRINE HCL 10 MG
10 TABLET ORAL 3 TIMES DAILY PRN
Status: DISCONTINUED | OUTPATIENT
Start: 2025-04-28 | End: 2025-04-29 | Stop reason: HOSPADM

## 2025-04-28 RX ORDER — ATENOLOL 50 MG/1
25 TABLET ORAL 2 TIMES DAILY
Status: DISCONTINUED | OUTPATIENT
Start: 2025-04-28 | End: 2025-04-29 | Stop reason: HOSPADM

## 2025-04-28 RX ORDER — BISACODYL 5 MG/1
5 TABLET, DELAYED RELEASE ORAL DAILY PRN
Status: DISCONTINUED | OUTPATIENT
Start: 2025-04-28 | End: 2025-04-29 | Stop reason: HOSPADM

## 2025-04-28 RX ORDER — KETOROLAC TROMETHAMINE 30 MG/ML
30 INJECTION, SOLUTION INTRAMUSCULAR; INTRAVENOUS ONCE
Status: COMPLETED | OUTPATIENT
Start: 2025-04-28 | End: 2025-04-28

## 2025-04-28 RX ORDER — ARFORMOTEROL TARTRATE 15 UG/2ML
15 SOLUTION RESPIRATORY (INHALATION)
Status: DISCONTINUED | OUTPATIENT
Start: 2025-04-28 | End: 2025-04-29 | Stop reason: HOSPADM

## 2025-04-28 RX ORDER — ALLOPURINOL 100 MG/1
100 TABLET ORAL DAILY
Status: DISCONTINUED | OUTPATIENT
Start: 2025-04-29 | End: 2025-04-29 | Stop reason: HOSPADM

## 2025-04-28 RX ORDER — BISACODYL 10 MG
10 SUPPOSITORY, RECTAL RECTAL DAILY PRN
Status: DISCONTINUED | OUTPATIENT
Start: 2025-04-28 | End: 2025-04-29 | Stop reason: HOSPADM

## 2025-04-28 RX ORDER — IPRATROPIUM BROMIDE AND ALBUTEROL SULFATE 2.5; .5 MG/3ML; MG/3ML
6 SOLUTION RESPIRATORY (INHALATION) ONCE
Status: COMPLETED | OUTPATIENT
Start: 2025-04-28 | End: 2025-04-28

## 2025-04-28 RX ORDER — SODIUM CHLORIDE 9 MG/ML
40 INJECTION, SOLUTION INTRAVENOUS AS NEEDED
Status: DISCONTINUED | OUTPATIENT
Start: 2025-04-28 | End: 2025-04-29 | Stop reason: HOSPADM

## 2025-04-28 RX ADMIN — KETOROLAC TROMETHAMINE 30 MG: 30 INJECTION, SOLUTION INTRAMUSCULAR; INTRAVENOUS at 10:12

## 2025-04-28 RX ADMIN — BUDESONIDE 0.5 MG: 0.5 SUSPENSION RESPIRATORY (INHALATION) at 19:12

## 2025-04-28 RX ADMIN — ARFORMOTEROL TARTRATE 15 MCG: 15 SOLUTION RESPIRATORY (INHALATION) at 14:04

## 2025-04-28 RX ADMIN — SODIUM CHLORIDE 1000 ML: 9 INJECTION, SOLUTION INTRAVENOUS at 08:41

## 2025-04-28 RX ADMIN — APIXABAN 5 MG: 5 TABLET, FILM COATED ORAL at 20:15

## 2025-04-28 RX ADMIN — ARFORMOTEROL TARTRATE 15 MCG: 15 SOLUTION RESPIRATORY (INHALATION) at 19:11

## 2025-04-28 RX ADMIN — PERFLUTREN 5 ML: 6.52 INJECTION, SUSPENSION INTRAVENOUS at 16:32

## 2025-04-28 RX ADMIN — BUDESONIDE 0.5 MG: 0.5 SUSPENSION RESPIRATORY (INHALATION) at 14:04

## 2025-04-28 RX ADMIN — IOPAMIDOL 100 ML: 755 INJECTION, SOLUTION INTRAVENOUS at 08:21

## 2025-04-28 RX ADMIN — Medication 10 ML: at 20:15

## 2025-04-28 RX ADMIN — ATENOLOL 25 MG: 50 TABLET ORAL at 20:15

## 2025-04-28 RX ADMIN — IPRATROPIUM BROMIDE AND ALBUTEROL SULFATE 6 ML: .5; 3 SOLUTION RESPIRATORY (INHALATION) at 10:34

## 2025-04-28 RX ADMIN — Medication 10 ML: at 17:11

## 2025-04-28 RX ADMIN — CYCLOBENZAPRINE HYDROCHLORIDE 10 MG: 10 TABLET, FILM COATED ORAL at 20:19

## 2025-04-28 NOTE — ED NOTES
WHEN AMBULATING PATIENT, PATIENT COMPLAINED OF BEING SOA AND CHEST TIGHTNESS, PATIENT'S OXYGEN SATURATION WAS 85% ON ROOM AIR

## 2025-04-28 NOTE — LETTER
April 29, 2025     Patient: Kevin Glasst   YOB: 1970   Date of Visit: 4/28/2025       To Whom It May Concern:    Please excuse Kevin from 4/28/25 - 4/30/25.  If you have any questions please call [phone].             Chloé has symptoms related to allergies.  You should limit exposure to pollens by keeping windows closed and running the air conditioner if possible.   Bathe or shower every night before bed to wash any allergens off before sleeping. Children who react to pets should not sleep with them.      First line treatment is to start or continue antihistamines daily such as claritin or zyrtec.  Children under 4 can take up to 5 mg, Children over 4 can take up to 10 mg daily.      The next level of treatment is to start or continue nasal spray such as flonase or nasacort.  Children under 12 take 1 squirt to each nostril daily, and children over 12 can take 2 squirts to each nostril once/day.      For some kids Singulair (montelukast) will work as well if the other treatments aren't working.

## 2025-04-28 NOTE — ED PROVIDER NOTES
Time: 11:58 AM EDT  Date of encounter:  4/28/2025  Independent Historian/Clinical History and Information was obtained by:   Patient  Chief Complaint: Shortness of breath    History is limited by: N/A    History of Present Illness:  Patient is a 54 y.o. year old male who presents to the emergency department for evaluation of increasing shortness of breath.  Patient reports symptoms started several days ago and is progressively getting worse.  Patient states he became more evident this morning when he had gotten up to the bathroom.  Patient is he cannot catch his breath.  Patient states that simple walking is too much for him.  Patient denies chest pain.  Patient denies a productive cough.  Patient denies any fevers.  Patient states this is a symptom he is never experienced before.    Patient Care Team  Primary Care Provider: Jennifer Hobbs APRN    Past Medical History:     Allergies   Allergen Reactions    Other GI Intolerance     Onions, peppers     Past Medical History:   Diagnosis Date    Allergies     Anxiety 08/17/2021    Degeneration of lumbar intervertebral disc 02/05/2016    Fatigue 02/02/2023    GERD (gastroesophageal reflux disease)     Gout     High blood pressure     Hypertension     Impaired fasting glucose 03/15/2022    Male hypogonadism 07/28/2021    Mid back pain 02/24/2016    Obstructive sleep apnea 07/28/2021    Polycythemia, secondary 03/15/2022    Related to ETOH abuse     Pulmonary embolism     Tendinitis of elbow 07/16/2021    Thoracic back pain     Vitamin D deficiency 07/28/2021     Past Surgical History:   Procedure Laterality Date    CHOLECYSTECTOMY      GALLBLADDER SURGERY      HEMORRHOIDECTOMY      LUMBAR EPIDURAL INJECTION  2015    TONSILLECTOMY       Family History   Problem Relation Age of Onset    Hypertension Mother     Hypertension Father        Home Medications:  Prior to Admission medications    Medication Sig Start Date End Date Taking? Authorizing Provider   allopurinol  (ZYLOPRIM) 100 MG tablet Take 1 tablet by mouth Daily. 12/18/24  Yes Jennifer Hobbs APRN   apixaban (Eliquis) 5 MG tablet tablet Take 1 tablet by mouth Every 12 (Twelve) Hours. 12/18/24  Yes Jennifer Hobbs APRN   atenolol (TENORMIN) 25 MG tablet Take 1 tablet by mouth 2 (Two) Times a Day. 12/18/24  Yes Jennifer Hobbs APRN   cyclobenzaprine (FLEXERIL) 10 MG tablet Take 1 tablet by mouth 3 (Three) Times a Day As Needed for Muscle Spasms. 4/6/25  Yes Dasha Ovalles APRN   Diclofenac Sodium (VOLTAREN) 1 % gel gel Apply 4 g topically to the appropriate area as directed 4 (Four) Times a Day As Needed (as needed for pain).  Patient taking differently: Apply 4 g topically to the appropriate area as directed 4 (Four) Times a Day As Needed (as needed for pain). Right elbow 2/25/25  Yes Cm Rosenthal MD   fluticasone (Flonase) 50 MCG/ACT nasal spray Administer 2 sprays into the nostril(s) as directed by provider Daily.  Patient taking differently: Administer 2 sprays into the nostril(s) as directed by provider Daily As Needed for Rhinitis or Allergies. 12/18/24  Yes Jennifer Hobbs APRN   hydroCHLOROthiazide 50 MG tablet Take 1 tablet by mouth Daily. 12/18/24  Yes Jennifer Hobbs APRN   lisinopril (PRINIVIL,ZESTRIL) 40 MG tablet TAKE 1 TABLET BY MOUTH DAILY 1/8/25  Yes Jennifer Hobbs APRN   pantoprazole (PROTONIX) 40 MG EC tablet Take 1 tablet by mouth Daily. 12/18/24  Yes Jennifer Hobbs APRN   vitamin D (ERGOCALCIFEROL) 1.25 MG (12559 UT) capsule capsule Take 1 capsule by mouth 1 (One) Time Per Week. Take with a meal. 12/18/24 4/28/25  Jennifer Hobbs APRN        Social History:   Social History     Tobacco Use    Smoking status: Never     Passive exposure: Never    Smokeless tobacco: Current     Types: Chew   Vaping Use    Vaping status: Never Used   Substance Use Topics    Alcohol use: Yes     Alcohol/week: 28.0 standard drinks of alcohol     Types: 28 Standard drinks or equivalent per week     " Comment: occais    Drug use: Never         Review of Systems:  Review of Systems   Constitutional:  Negative for chills and fever.   HENT:  Negative for congestion, ear pain and sore throat.    Eyes:  Negative for pain.   Respiratory:  Positive for shortness of breath. Negative for cough and chest tightness.    Cardiovascular:  Negative for chest pain.   Gastrointestinal:  Negative for abdominal pain, diarrhea, nausea and vomiting.   Genitourinary:  Negative for flank pain and hematuria.   Musculoskeletal:  Negative for joint swelling.   Skin:  Negative for pallor.   Neurological:  Negative for seizures and headaches.   All other systems reviewed and are negative.       Physical Exam:  /91 (Patient Position: Sitting)   Pulse 63   Temp 98.2 °F (36.8 °C) (Oral)   Resp 18   Ht 172.7 cm (68\")   Wt (!) 145 kg (320 lb)   SpO2 (!) 85%   BMI 48.66 kg/m²     Physical Exam    Vital signs were reviewed under triage note.  General appearance - Patient appears well-developed and well-nourished.  Patient is in no acute distress.  Obese.  Head - Normocephalic, atraumatic.  Pupils - Equal, round, reactive to light.  Extraocular muscles are intact.  Conjunctiva is clear.  Nasal - Normal inspection.  No evidence of trauma or epistaxis.  Tympanic membranes - Gray, intact without erythema or retractions.  Oral mucosa - Pink and moist without lesions or erythema.  Uvula is midline.  Chest wall - Atraumatic.  Chest wall is nontender.  There are no vesicular rashes noted.  Neck - Supple.  Trachea was midline.  There is no palpable lymphadenopathy or thyromegaly.  There are no meningeal signs  Lungs - Auscultation revealed diminished air exchange/movement..  Heart - Regular rate and rhythm without any murmurs, clicks, or gallops.  Abdomen - Soft.  Bowel sounds are present.  There is no palpable tenderness.  There is no rebound, guarding, or rigidity.  There are no palpable masses.  There are no pulsatile masses.  Back - Spine " is straight and midline.  There is no CVA tenderness.  Extremities - Intact x4 with full range of motion.  There is 2+ palpable edema.  Pulses are intact x4 and equal.  Neurologic - Patient is awake, alert, and oriented x3.  Cranial nerves II through XII are grossly intact.  Motor and sensory functions grossly intact.  Cerebellar function was normal.  Integument - There are no rashes.  There are no petechia or purpura lesions noted.  There are no vesicular lesions noted.           Procedures:  Procedures      Medical Decision Making:      Comorbidities that affect care:    Gout, pulmonary embolism, hypertension, obstructive sleep apnea, GERD, vitamin D deficiency, anxiety    External Notes reviewed:    Previous Clinic Note: Urgent care visit from AIDAN Cuadra on 4/6/2025 was reviewed by me.      The following orders were placed and all results were independently analyzed by me:  Orders Placed This Encounter   Procedures    Respiratory Panel PCR w/COVID-19(SARS-CoV-2) [facility] In-House, NP Swab in UTM/VTM, 2 HR TAT - Swab, Nasopharynx    XR Chest 1 View    CT Angiogram Chest Pulmonary Embolism    [place] Draw    Comprehensive Metabolic Panel    BNP    High Sensitivity Troponin T    CBC Auto Differential    High Sensitivity Troponin T 1Hr    TSH Rfx On Abnormal To Free T4    Magnesium    T4, Free    Lactic Acid, Plasma    Urinalysis With Microscopic If Indicated (No Culture) - Urine, Clean Catch    C-reactive Protein    Sedimentation Rate    NPO Diet NPO Type: Strict NPO    Undress & Gown    Continuous Pulse Oximetry    Vital Signs    Vital Signs    Intake & Output    Weigh Patient    Oral Care    Saline Lock & Maintain IV Access    Place Sequential Compression Device    Maintain Sequential Compression Device    Activity - Ad Stacy    Discontinue Cardiac Monitoring    Hospitalist (on-call MD unless specified)    Inpatient Pulmonology Consult    Inpatient RT Case Management Consult    Oxygen  Therapy- Nasal Cannula; Titrate 1-6 LPM Per SpO2; 90 - 95%    ECG 12 Lead ED Triage Standing Order; SOA    Adult Transthoracic Echo Complete W/ Cont if Necessary Per Protocol    Insert Peripheral IV    Insert Peripheral IV    Initiate Observation Status    CBC & Differential    Green Top (Gel)    Lavender Top    Gold Top - SST    Light Blue Top       Medications Given in the Emergency Department:  Medications   sodium chloride 0.9 % flush 10 mL (has no administration in time range)   sodium chloride 0.9 % flush 10 mL (has no administration in time range)   sodium chloride 0.9 % flush 10 mL (has no administration in time range)   sodium chloride 0.9 % infusion 40 mL (has no administration in time range)   ondansetron (ZOFRAN) injection 4 mg (has no administration in time range)   sennosides-docusate (PERICOLACE) 8.6-50 MG per tablet 2 tablet (has no administration in time range)     And   polyethylene glycol (MIRALAX) packet 17 g (has no administration in time range)     And   bisacodyl (DULCOLAX) EC tablet 5 mg (has no administration in time range)     And   bisacodyl (DULCOLAX) suppository 10 mg (has no administration in time range)   arformoterol (BROVANA) nebulizer solution 15 mcg (has no administration in time range)   budesonide (PULMICORT) nebulizer solution 0.5 mg (has no administration in time range)   sodium chloride 0.9 % bolus 1,000 mL (0 mL Intravenous Stopped 4/28/25 0911)   iopamidol (ISOVUE-370) 76 % injection 100 mL (100 mL Intravenous Given 4/28/25 0821)   ketorolac (TORADOL) injection 30 mg (30 mg Intravenous Given 4/28/25 1012)   ipratropium-albuterol (DUO-NEB) nebulizer solution 6 mL (6 mL Nebulization Given 4/28/25 1034)        ED Course:    ED Course as of 04/28/25 1210   Mon Apr 28, 2025   5966 EKG performed at 6:44 AM was interpreted by me to show a normal sinus rhythm with a ventricular rate of 68 bpm.  The IL interval is normal.  P waves are normal.  QRS intervals normal.  Axis was at 17  degrees.  There was no acute ischemic ST or T wave changes identified.  QT corrected was 424 ms. [TB]      ED Course User Index  [TB] Kevin Bautista DO       The patient was seen and evaluated in the ED by me.  The above history and physical examination was performed as documented.  Diagnostic data was obtained.  Results reviewed.  Clinically the patient appears to have congestive heart failure however his BNP was normal.  CT scan was negative for pulmonary embolism, pleural effusion or pneumonic process.  Cardiac labs were negative as well.  Patient was saturating only at 90 to 92% most the time at room air in the bed.  So prior to discharge I ambulated the patient approximately 40 feet and was noted to quickly desaturate to the mid 80s.  Etiology of his hypoxia is unclear.  Do not feel patient is safe for discharge home at this time.  I have consulted the hospital service for admission.    Labs:    Lab Results (last 24 hours)       Procedure Component Value Units Date/Time    CBC & Differential [[id number]]  (Abnormal) Collected: 04/28/25 0648    Specimen: Blood from Arm, Right Updated: 04/28/25 0707    Narrative:      The following orders were created for panel order CBC & Differential.  Procedure                               Abnormality         Status                     ---------                               -----------         ------                     CBC Auto Differential[[id number]]        Abnormal            Final result                 Please view results for these tests on the individual orders.    Comprehensive Metabolic Panel [[id number]]  (Abnormal) Collected: 04/28/25 0648    Specimen: Blood from Arm, Right Updated: 04/28/25 0728     Glucose 115 mg/dL      BUN 32 mg/dL      Creatinine 1.40 mg/dL      Sodium 130 mmol/L      Potassium 3.9 mmol/L      Chloride 97 mmol/L      CO2 18.5 mmol/L      Calcium 8.4 mg/dL      Total Protein 6.4 g/dL      Albumin 3.4 g/dL      ALT (SGPT) 21 U/L      AST (SGOT) 27  U/L      Alkaline Phosphatase 55 U/L      Total Bilirubin 0.3 mg/dL      Globulin 3.0 gm/dL      A/G Ratio 1.1 g/dL      BUN/Creatinine Ratio 22.9     Anion Gap 14.5 mmol/L      eGFR 60.1 mL/min/1.73     Narrative:      GFR Categories in Chronic Kidney Disease (CKD)      GFR Category          GFR (mL/min/1.73)    Interpretation  G1                     90 or greater         Normal or high (1)  G2                      60-89                Mild decrease (1)  G3a                   45-59                Mild to moderate decrease  G3b                   30-44                Moderate to severe decrease  G4                    15-29                Severe decrease  G5                    14 or less           Kidney failure          (1)In the absence of evidence of kidney disease, neither GFR category G1 or G2 fulfill the criteria for CKD.    eGFR calculation 2021 CKD-EPI creatinine equation, which does not include race as a factor    BNP [[id number]]  (Normal) Collected: 04/28/25 0648    Specimen: Blood from Arm, Right Updated: 04/28/25 0724     proBNP 565.6 pg/mL     Narrative:      This assay is used as an aid in the diagnosis of individuals suspected of having heart failure. It can be used as an aid in the diagnosis of acute decompensated heart failure (ADHF) in patients presenting with signs and symptoms of ADHF to the emergency department (ED). In addition, NT-proBNP of <300 pg/mL indicates ADHF is not likely.    Age Range Result Interpretation  NT-proBNP Concentration (pg/mL:      <50             Positive            >450                   Gray                 300-450                    Negative             <300    50-75           Positive            >900                  Gray                300-900                  Negative            <300      >75             Positive            >1800                  Gray                300-1800                  Negative            <300    High Sensitivity Troponin T [[id number]]  (Normal)  Collected: 04/28/25 0648    Specimen: Blood from Arm, Right Updated: 04/28/25 0728     HS Troponin T 19 ng/L     Narrative:      High Sensitive Troponin T Reference Range:  <14.0 ng/L- Negative Female for AMI  <22.0 ng/L- Negative Male for AMI  >=14 - Abnormal Female indicating possible myocardial injury.  >=22 - Abnormal Male indicating possible myocardial injury.   Clinicians would have to utilize clinical acumen, EKG, Troponin, and serial changes to determine if it is an Acute Myocardial Infarction or myocardial injury due to an underlying chronic condition.         CBC Auto Differential [[id number]]  (Abnormal) Collected: 04/28/25 0648    Specimen: Blood from Arm, Right Updated: 04/28/25 0707     WBC 6.48 10*3/mm3      RBC 5.05 10*6/mm3      Hemoglobin 15.5 g/dL      Hematocrit 46.1 %      MCV 91.3 fL      MCH 30.7 pg      MCHC 33.6 g/dL      RDW 13.0 %      RDW-SD 43.0 fl      MPV 9.8 fL      Platelets 251 10*3/mm3      Neutrophil % 53.2 %      Lymphocyte % 21.5 %      Monocyte % 19.1 %      Eosinophil % 4.6 %      Basophil % 0.8 %      Immature Grans % 0.8 %      Neutrophils, Absolute 3.45 10*3/mm3      Lymphocytes, Absolute 1.39 10*3/mm3      Monocytes, Absolute 1.24 10*3/mm3      Eosinophils, Absolute 0.30 10*3/mm3      Basophils, Absolute 0.05 10*3/mm3      Immature Grans, Absolute 0.05 10*3/mm3      nRBC 0.0 /100 WBC     TSH Rfx On Abnormal To Free T4 [[id number]]  (Abnormal) Collected: 04/28/25 0648    Specimen: Blood from Arm, Right Updated: 04/28/25 0822     TSH 5.460 uIU/mL     Magnesium [[id number]]  (Normal) Collected: 04/28/25 0648    Specimen: Blood from Arm, Right Updated: 04/28/25 0805     Magnesium 1.7 mg/dL     T4, Free [[id number]]  (Normal) Collected: 04/28/25 0648    Specimen: Blood from Arm, Right Updated: 04/28/25 0901     Free T4 1.19 ng/dL     High Sensitivity Troponin T 1Hr [[id number]]  (Normal) Collected: 04/28/25 0811    Specimen: Blood Updated: 04/28/25 0837     HS Troponin T 19 ng/L       Troponin T Numeric Delta 0 ng/L     Narrative:      High Sensitive Troponin T Reference Range:  <14.0 ng/L- Negative Female for AMI  <22.0 ng/L- Negative Male for AMI  >=14 - Abnormal Female indicating possible myocardial injury.  >=22 - Abnormal Male indicating possible myocardial injury.   Clinicians would have to utilize clinical acumen, EKG, Troponin, and serial changes to determine if it is an Acute Myocardial Infarction or myocardial injury due to an underlying chronic condition.                  Imaging:    CT Angiogram Chest Pulmonary Embolism  Result Date: 4/28/2025  CT ANGIOGRAM CHEST PULMONARY EMBOLISM Date of Exam: 4/28/2025 8:19 AM EDT Indication: Exertional dyspnea.  History of PE. Comparison: Chest radiograph from earlier today and CT chest from July 27, 2021 Technique: Axial CT images were obtained of the chest after the uneventful intravenous administration of iodinated contrast utilizing pulmonary embolism protocol.  Reconstructed coronal and sagittal images were also obtained. In addition, a 3-D volume rendered image was created for interpretation.  Automated exposure control and iterative construction methods were used. Findings: The central tracheobronchial tree is clear. The lungs are clear. There is no pleural effusion. The heart size appears normal. The great vessels are normal in caliber. There is no evidence of pulmonary embolus. No abnormally enlarged lymph nodes are identified. Partial evaluation of the upper abdomen demonstrates hepatic steatosis. No aggressive osseous lesions are identified.     Impression: 1.Negative for pulmonary embolus. 2.No acute cardiopulmonary process. 3.Hepatic steatosis. Electronically Signed: Jr Christina MD  4/28/2025 8:48 AM EDT  Workstation ID: [id number]    XR Chest 1 View  Result Date: 4/28/2025  XR CHEST 1 VW Date of Exam: 4/28/2025 6:52 AM EDT Indication: SOA Triage Protocol Comparison: 1/2/2025 Findings: Cardiac and mediastinal contours are normal.  Pulmonary vascularity is normal. The lungs are clear except for granulomatous calcification. No pneumothorax. There is mild thoracic dextroscoliosis.     No active disease. Electronically Signed: Andrew Pack MD  4/28/2025 6:59 AM EDT  Workstation ID: [id number]        Differential Diagnosis and Discussion:    Dyspnea: Differential diagnosis includes but is not limited to metabolic acidosis, neurological disorders, psychogenic, asthma, pneumothorax, upper airway obstruction, COPD, pneumonia, noncardiogenic pulmonary edema, interstitial lung disease, anemia, congestive heart failure, and pulmonary embolism    Labs were collected in the emergency department and all labs were reviewed and interpreted by me.  X-ray were performed in the emergency department and all X-ray impressions were independently interpreted by me.  An EKG was performed and the EKG was interpreted by me.  CT scan was performed in the emergency department and the CT scan radiology impression was interpreted by me.    MDM     Amount and/or Complexity of Data Reviewed  Clinical lab tests: reviewed  Tests in the radiology section of CPT®: reviewed  Tests in the medicine section of CPT®: reviewed  Decide to obtain previous medical records or to obtain history from someone other than the patient: yes             Patient Care Considerations:    SEPSIS was considered but is NOT present in the emergency department as SIRS criteria is not present.      Consultants/Shared Management Plan:    Hospitalist: I have discussed the case with Dr. Pulido who agrees to accept the patient for admission.    Social Determinants of Health:    Patient is independent, reliable, and has access to care.       Disposition and Care Coordination:    Admit:   Through independent evaluation of the patient's history, physical, and imperical data, the patient meets criteria for inpatient admission to the hospital.        Final diagnoses:   Acute respiratory failure with hypoxia    Dyspnea on exertion        ED Disposition       ED Disposition   Decision to Admit    Condition   --    Comment   Level of Care: Med/Surg [1]   Diagnosis: Shortness of breath [786.05.ICD-9-CM]   Admitting Physician: MALA BALLARD [[id number]]   Attending Physician: MALA BALLARD [[id number]]                 This medical record created using voice recognition software.             Kevin Bautista DO  04/30/25 1145

## 2025-04-28 NOTE — H&P
[facility]   HOSPITALIST HISTORY AND PHYSICAL  Date: 2025   Patient Name: Kevin Bradshaw  : 1970  MRN: [id number]  Primary Care Physician:  Jennifer Hobbs APRN  Date of admission: 2025    Subjective   Subjective     Chief Complaint: short of air     HPI:    Kevin Brdashaw is a 54 y.o. male with a past medical history of morbid obesity, LONNY, history of PE on Eliquis, HTN, DDD who presented to the ER for shortness of breath ongoing for a few days. In The ER his workup was normal with normal CXR and CT of chest. Patient was noted to be hypoxic with ambulation of greater than 20 feet and had a desaturation down to 84%.  Patient does not smoke and has not history of any lung disease. He does have LONNY and does use CPAP.  Patient was noted to have a very small elevation of his Cr to 1.4.  Given his hypoxia and shortness of air the hospital service was asked for observation admission and Pulmonary consult       Personal History     Past Medical History:  Past Medical History:   Diagnosis Date    Allergies     Anxiety 2021    Degeneration of lumbar intervertebral disc 2016    Fatigue 2023    GERD (gastroesophageal reflux disease)     Gout     High blood pressure     Hypertension     Impaired fasting glucose 03/15/2022    Male hypogonadism 2021    Mid back pain 2016    Obstructive sleep apnea 2021    Polycythemia, secondary 03/15/2022    Related to ETOH abuse     Pulmonary embolism     Tendinitis of elbow 2021    Thoracic back pain     Vitamin D deficiency 2021       Past Surgical History:  Past Surgical History:   Procedure Laterality Date    CHOLECYSTECTOMY      GALLBLADDER SURGERY      HEMORRHOIDECTOMY      LUMBAR EPIDURAL INJECTION  2015    TONSILLECTOMY         Family History:   Family History   Problem Relation Age of Onset    Hypertension Mother     Hypertension Father        Social History:   Social History     Socioeconomic History    Marital  status:    Tobacco Use    Smoking status: Never     Passive exposure: Never    Smokeless tobacco: Current     Types: Chew   Vaping Use    Vaping status: Never Used   Substance and Sexual Activity    Alcohol use: Yes     Alcohol/week: 28.0 standard drinks of alcohol     Types: 28 Standard drinks or equivalent per week     Comment: occais    Drug use: Never    Sexual activity: Defer       Home Medications:  Diclofenac Sodium, allopurinol, apixaban, atenolol, cyclobenzaprine, fluticasone, hydroCHLOROthiazide, lisinopril, and pantoprazole    Allergies:  Allergies   Allergen Reactions    Other GI Intolerance     Onions, peppers       Review of Systems  History obtained from the patient  General ROS Negative for - chills, fever, malaise, or night sweats  Psychological ROS: negative for - anxiety, depression, hallucinations, or mood swings  Ophthalmic ROS: negative for - blurry vision, double vision, or itchy eyes  ENT ROS: negative for - headaches, nasal congestion, sneezing, or sore throat  Hematological and Lymphatic ROS: negative for - bleeding problems, bruising, or jaundice  Endocrine ROS: negative for - malaise/lethargy, polydipsia/polyuria, or temperature intolerance  Respiratory ROS: Positive for shortness of air   Cardiovascular ROS: negative for - chest pain, dyspnea on exertion, edema, palpitations, or paroxysmal nocturnal dyspnea  Gastrointestinal ROS: negative for - abdominal pain, constipation, diarrhea, heartburn, hematemesis, or nausea/vomiting  Genito-Urinary ROS: negative for - change in urinary stream, hematuria, incontinence, or urinary frequency/urgency  Musculoskeletal ROS: Positive for - joint stiffness, joint swelling, muscle pain, or muscular weakness due to chronic back pain   Neurological ROS: negative for - confusion, dizziness, gait disturbance, headaches, impaired coordination/balance, memory loss, numbness/tingling, seizures, or visual changes  Dermatological ROS: negative for dry  "skin and rash             Objective   Objective     Vitals:   Temp:  [98.2 °F (36.8 °C)] 98.2 °F (36.8 °C)  Heart Rate:  [63-77] 63  Resp:  [18-22] 18  BP: (108-144)/(63-91) 108/91    Physical Exam    Constitutional: Awake, alert, no acute distress. Morbidly Obese resting in ER gurney with wife at the bedside    Eyes: Pupils equal, sclerae anicteric, no conjunctival injection   HENT: NCAT, mucous membranes moist   Neck: Supple, no thyromegaly, no lymphadenopathy, trachea midline   Respiratory: Clear to auscultation bilaterally, nonlabored respirations    Cardiovascular: RRR, no murmurs, rubs, or gallops, palpable pedal pulses bilaterally   Gastrointestinal: Positive bowel sounds, soft, nontender, nondistended   Musculoskeletal: No bilateral ankle edema, no clubbing or cyanosis to extremities   Psychiatric: Appropriate affect, cooperative   Neurologic: Oriented x 3, strength symmetric in all extremities, Cranial Nerves grossly intact to confrontation, speech clear   Skin: No rashes     Result Review    Result Review:  I have personally reviewed the results from the time of this admission to 4/28/2025 11:56 EDT and agree with these findings:  [x]  Laboratory  Basic Metabolic Panel    Sodium Sodium   Date Value Ref Range Status   04/28/2025 130 (L) 136 - 145 mmol/L Final      Potassium Potassium   Date Value Ref Range Status   04/28/2025 3.9 3.5 - 5.2 mmol/L Final      Chloride Chloride   Date Value Ref Range Status   04/28/2025 97 (L) 98 - 107 mmol/L Final      Bicarbonate No results found for: \"PLASMABICARB\"   BUN BUN   Date Value Ref Range Status   04/28/2025 32 (H) 6 - 20 mg/dL Final      Creatinine Creatinine   Date Value Ref Range Status   04/28/2025 1.40 (H) 0.76 - 1.27 mg/dL Final      Calcium Calcium   Date Value Ref Range Status   04/28/2025 8.4 (L) 8.6 - 10.5 mg/dL Final      Glucose      No components found for: \"GLUCOSE.*\"     CBC          1/2/2025    10:28 2/17/2025    21:30 4/28/2025    06:48   CBC   WBC " 9.81  12.80  6.48    RBC 5.44  5.39  5.05    Hemoglobin 16.3  16.1  15.5    Hematocrit 48.9  48.7  46.1    MCV 89.9  90.4  91.3    MCH 30.0  29.9  30.7    MCHC 33.3  33.1  33.6    RDW 13.1  13.3  13.0    Platelets 317  327  251        []  Microbiology  [x]  Radiology  CT Angiogram Chest Pulmonary Embolism  Result Date: 4/28/2025  CT ANGIOGRAM CHEST PULMONARY EMBOLISM Date of Exam: 4/28/2025 8:19 AM EDT Indication: Exertional dyspnea.  History of PE. Comparison: Chest radiograph from earlier today and CT chest from July 27, 2021 Technique: Axial CT images were obtained of the chest after the uneventful intravenous administration of iodinated contrast utilizing pulmonary embolism protocol.  Reconstructed coronal and sagittal images were also obtained. In addition, a 3-D volume rendered image was created for interpretation.  Automated exposure control and iterative construction methods were used. Findings: The central tracheobronchial tree is clear. The lungs are clear. There is no pleural effusion. The heart size appears normal. The great vessels are normal in caliber. There is no evidence of pulmonary embolus. No abnormally enlarged lymph nodes are identified. Partial evaluation of the upper abdomen demonstrates hepatic steatosis. No aggressive osseous lesions are identified.     Impression: Impression: 1.Negative for pulmonary embolus. 2.No acute cardiopulmonary process. 3.Hepatic steatosis. Electronically Signed: Jr Christina MD  4/28/2025 8:48 AM EDT  Workstation ID: [id number]    XR Chest 1 View  Result Date: 4/28/2025  XR CHEST 1 VW Date of Exam: 4/28/2025 6:52 AM EDT Indication: SOA Triage Protocol Comparison: 1/2/2025 Findings: Cardiac and mediastinal contours are normal. Pulmonary vascularity is normal. The lungs are clear except for granulomatous calcification. No pneumothorax. There is mild thoracic dextroscoliosis.     Impression: No active disease. Electronically Signed: Andrew Pack MD  4/28/2025  6:59 AM EDT  Workstation ID: [id number]        []  EKG/Telemetry   []  Cardiology/Vascular   []  Pathology  []  Old records  []  Other:      Assessment & Plan   Assessment / Plan     Assessment/Plan:   Acute Hypoxia with Ambulation  Morbid Obesity with a BMI of 54  HTN  History of PE on Eliquis   LONNY on Cpap  Never a smoker   Chronic DDD with chronic back pain     PLAN  - Admit patient for observation  -Obtain Pulmonary consult. Discussed with Dr. Dwakins  -Obtain Echo  - Continue home Eliquis  -Continue home blood pressure medication B-blocker and diuretic  -Will need outpatient PFT and possible repeat sleep study  - discussed plan with patient and nurse       VTE Prophylaxis:  Mechanical VTE prophylaxis orders are present.        CODE STATUS:             Electronically signed by Rakan Pulido DO, 04/28/25, 11:56 AM EDT.

## 2025-04-28 NOTE — CONSULTS
Pulmonary / Critical Care Consult Note      Patient Name: Kevin Bradshaw  : 1970  MRN: [id number]  Primary Care Physician:  Jennifer Hobbs APRN  Referring Physician: Rakan Pulido DO  Date of admission: 2025    Subjective   Subjective     Reason for Consult/ Chief Complaint:   Exertional dyspnea    HPI:  Kevin Bradshaw is a 54 y.o. male with past medical history of LONNY on CPAP, obesity presented to the ED for evaluation of worsening shortness of breath.  In the ED imaging and labs were normal.  However, when patient was walk test today he desatted into the 80s.  Patient was admitted for further evaluation.  The service was consulted to assist in management treating the patient's acute issues.  Upon assessment, patient lying in bed on room air in no pain respiratory distress.  Findings and plan were discussed with the patient.  Patient reporting that he is felt short of breath for the last couple days.  Patient reports that he is a never smoker.  Patient reports that he was somewhat wheezy on arrival to the hospital.  Patient reportedly does not use any nebulizers or breathing treatments at home.  Patient reports having a CPAP at home that has been changed within the last couple hours        Personal History     Past Medical History:   Diagnosis Date    Allergies     Anxiety 2021    Degeneration of lumbar intervertebral disc 2016    Fatigue 2023    GERD (gastroesophageal reflux disease)     Gout     High blood pressure     Hypertension     Impaired fasting glucose 03/15/2022    Male hypogonadism 2021    Mid back pain 2016    Obstructive sleep apnea 2021    Polycythemia, secondary 03/15/2022    Related to ETOH abuse     Pulmonary embolism     Tendinitis of elbow 2021    Thoracic back pain     Vitamin D deficiency 2021       Past Surgical History:   Procedure Laterality Date    CHOLECYSTECTOMY      GALLBLADDER SURGERY      HEMORRHOIDECTOMY      LUMBAR  EPIDURAL INJECTION  2015    TONSILLECTOMY         Family History: family history includes Hypertension in his father and mother. Otherwise pertinent FHx was reviewed and not pertinent to current issue.    Social History:  reports that he has never smoked. He has never been exposed to tobacco smoke. His smokeless tobacco use includes chew. He reports current alcohol use of about 28.0 standard drinks of alcohol per week. He reports that he does not use drugs.    Home Medications:  Diclofenac Sodium, allopurinol, apixaban, atenolol, cyclobenzaprine, fluticasone, hydroCHLOROthiazide, lisinopril, and pantoprazole    Allergies:  Allergies   Allergen Reactions    Other GI Intolerance     Onions, peppers       Objective    Objective     Vitals:   Temp:  [98.2 °F (36.8 °C)] 98.2 °F (36.8 °C)  Heart Rate:  [59-77] 59  Resp:  [18-22] 20  BP: (108-144)/(63-91) 110/68    Physical Exam:  Vital Signs Reviewed   General:  WDWN, Alert, NAD.    HEENT:  PERRL, EOMI.  OP, nares clear, no sinus tenderness  Neck:  Supple, no JVD, no thyromegaly  Lymph: no axillary, cervical, supraclavicular lymphadenopathy noted bilaterally  Chest:  good aeration, clear to auscultation bilaterally, tympanic to percussion bilaterally, no work of breathing noted  CV: RRR, no MGR, pulses 2+, equal.  Abd:  Soft, NT, ND, + BS, no HSM  EXT:  no clubbing, no cyanosis, no edema, no joint tenderness  Neuro:  A&Ox3, CN grossly intact, no focal deficits.  Skin: No rashes or lesions noted      Result Review    Result Review:  I have personally reviewed the results from the time of this admission to 4/28/2025 13:43 EDT and agree with these findings:  []  Laboratory  []  Microbiology  []  Radiology  []  EKG/Telemetry   []  Cardiology/Vascular   []  Pathology  []  Old records  []  Other:  Most notable findings include:         Lab 04/28/25  0648   WBC 6.48   HEMOGLOBIN 15.5   HEMATOCRIT 46.1   PLATELETS 251   SODIUM 130*   POTASSIUM 3.9   CHLORIDE 97*   CO2 18.5*   BUN  32*   CREATININE 1.40*   GLUCOSE 115*   CALCIUM 8.4*   TOTAL PROTEIN 6.4   ALBUMIN 3.4*   GLOBULIN 3.0         Assessment & Plan   Assessment / Plan     Active Hospital Problems:  Active Hospital Problems    Diagnosis     **Shortness of breath      Impression:  Exertional dyspnea and hypoxia  LONNY on home CPAP  Question COPD  Question CHF  Obesity class III with BMI of 48    Plan:    Continue supplemental oxygen to keep SpO2 greater 90%  Checks x-ray without acute cardiopulmonary process  CT angio without PE.  No acute process  Micro negative  Will follow 2D echo  Check IgE  Continue nebs and bronchopulmonary hygiene  Encourage weight loss  Encourage activity as tolerated  6-minute walk test prior to discharge  Follow-up in pulmonary clinic 1 to 2 weeks after discharge for outpatient PFTs    VTE Prophylaxis:  Mechanical VTE prophylaxis orders are present.      There are no questions and answers to display.      Labs, imaging, notes and medications personally reviewed.  Discussed with primary    Thank you for involving me in the care of this patient.    Electronically signed by AIDAN Sampson, 04/28/25, 1:48 PM EDT.    This patient was seen by both a physician and a NP. I, Rody Dawkins MD, spent >50% of time in accordance with split shared billing. This included personally reviewing all pertinent labs, imaging, microbiology and documentation. Also discussing the case with the patient and any available family, the admitting physician and any available ancillary staff.   Electronically signed by Rody Dawkins MD, 04/28/25, 3:19 PM EDT.

## 2025-04-28 NOTE — PLAN OF CARE
Goal Outcome Evaluation:  Plan of Care Reviewed With: patient        Progress: no change  Outcome Evaluation: Patient admitted to 4NT this shift from emergency department. Patient on room air and vss. No complaints of pain throughout the shift. No new issues at this time.

## 2025-04-28 NOTE — CONSULTS
COPD Education    Referring Provider: pulmonary/Wal[place]  Reason for Consultation: COPD needs  Pulmonologist:  none    Age: 54 y.o.  Sex: male    BMI:Body mass index is 48.66 kg/m².     Past Medical Hx: LONNY, GERD, HTN, pulmonary embolism    Smoking Hx: Social History    Tobacco Use      Smoking status: Never        Passive exposure: Never      Smokeless tobacco: Current        Types: Chew    Social History     Substance and Sexual Activity   Drug Use Never      Home Equipment Used: O2 DME: [employer]    Daily symptoms: SOA at rest w/exertion, nonproductive cough, wheeze    Airway Clearance methods utilized: None/no issues    Have you ever attended Pulmonary Rehab? no    Last PFT: never    Have you ever had a sleep study/PSG? Yes; home sleep test completed 2/10.20; canceled recent sleep lab appt.      Was on auto CPAP 10-20cm H2O    Last Arterial Blood Gas:   Lab Results   Component Value Date    [id number] 20.6 (L) 03/22/2021      Chest X-Ray findings:   Pulmonary vascularity is normal. The lungs are clear except for granulomatous calcification.     Chest CT findings:   1.Negative for pulmonary embolus.  2.No acute cardiopulmonary process.  3.Hepatic steatosis.       Mr. Bradshaw is a never smoker who has never had a PFT. Patient denies previous history of COPD or asthma. Echo pending. Patient did have wheezing noted on admission; history of allergies and seasonal allergic rhinintis. Pulmonologist recommending outpatient PFT.     Complete PFT order placed for cosign; scheduled for July 9th at 8 am.   Walking oximetry to assess ambulatory oxygen needs; pt currently on room air at rest  Patient states he has not been able to follow up with sleep for CPAP supplies order;   Supply order placed for cosign. Please include the following verbiage for DME/insurance:   Mr. Bradshaw is using an benefiting from CPAP at home; continue home CPAP.     Racheal Morris, RRT   RT   04/28/25  12:41 EDT

## 2025-04-29 ENCOUNTER — READMISSION MANAGEMENT (OUTPATIENT)
Dept: CALL CENTER | Facility: HOSPITAL | Age: 55
End: 2025-04-29
Payer: COMMERCIAL

## 2025-04-29 VITALS
BODY MASS INDEX: 47.74 KG/M2 | HEART RATE: 66 BPM | DIASTOLIC BLOOD PRESSURE: 63 MMHG | OXYGEN SATURATION: 96 % | HEIGHT: 68 IN | WEIGHT: 315 LBS | RESPIRATION RATE: 20 BRPM | SYSTOLIC BLOOD PRESSURE: 119 MMHG | TEMPERATURE: 98.2 F

## 2025-04-29 PROBLEM — K76.0 HEPATIC STEATOSIS: Status: ACTIVE | Noted: 2025-04-29

## 2025-04-29 PROCEDURE — G0378 HOSPITAL OBSERVATION PER HR: HCPCS

## 2025-04-29 PROCEDURE — 99239 HOSP IP/OBS DSCHRG MGMT >30: CPT | Performed by: INTERNAL MEDICINE

## 2025-04-29 PROCEDURE — 94799 UNLISTED PULMONARY SVC/PX: CPT

## 2025-04-29 PROCEDURE — 94618 PULMONARY STRESS TESTING: CPT

## 2025-04-29 PROCEDURE — 99214 OFFICE O/P EST MOD 30 MIN: CPT | Performed by: STUDENT IN AN ORGANIZED HEALTH CARE EDUCATION/TRAINING PROGRAM

## 2025-04-29 PROCEDURE — 94664 DEMO&/EVAL PT USE INHALER: CPT

## 2025-04-29 RX ADMIN — PANTOPRAZOLE SODIUM 40 MG: 40 TABLET, DELAYED RELEASE ORAL at 08:49

## 2025-04-29 RX ADMIN — APIXABAN 5 MG: 5 TABLET, FILM COATED ORAL at 08:49

## 2025-04-29 RX ADMIN — Medication 10 ML: at 08:50

## 2025-04-29 RX ADMIN — ALLOPURINOL 100 MG: 100 TABLET ORAL at 08:49

## 2025-04-29 RX ADMIN — ARFORMOTEROL TARTRATE 15 MCG: 15 SOLUTION RESPIRATORY (INHALATION) at 07:23

## 2025-04-29 RX ADMIN — BUDESONIDE 0.5 MG: 0.5 SUSPENSION RESPIRATORY (INHALATION) at 07:23

## 2025-04-29 NOTE — PLAN OF CARE
Goal Outcome Evaluation:  Plan of Care Reviewed With: patient        Progress: no change  Outcome Evaluation: Patient alert and oriented x4 on room air. Patient is up ad chong. Oxygen saturations improved this shift during walk test. No complaints of pain throughout the shift. Patient discharging home via personal vehicle with spouse.

## 2025-04-29 NOTE — DISCHARGE SUMMARY
[facility]         HOSPITALIST  DISCHARGE SUMMARY    Patient Name: Kevin Bradshaw  : 1970  MRN: [id number]    Date of Admission: 2025  Date of Discharge:  2025    Primary Care Physician: Jennifer Hobbs APRN    Consults       Date and Time Order Name Status Description    2025 11:56 AM Inpatient Pulmonology Consult      2025 11:12 AM Hospitalist (on-call MD unless specified)              Active and Resolved Hospital Problems:  Active Hospital Problems    Diagnosis POA   • **Shortness of breath [R06.02] Yes   • Obesity, morbid, BMI 50 or higher [E66.01] Yes   • Essential hypertension [I10] Yes   • Obstructive sleep apnea [G47.33] Yes      Resolved Hospital Problems   No resolved problems to display.       Hospital Course     Hospital Course:  Kevin Bradshaw is a 54 y.o. male with a past medical history of morbid obesity, LONNY, history of PE on Eliquis, HTN, DDD who presented to the ER for shortness of breath ongoing for a few days. In The ER his workup was normal with normal CXR and CT of chest. Patient was noted to be hypoxic with ambulation of greater than 20 feet and had a desaturation down to 84%.  Patient does not smoke and has not history of any lung disease. He does have LONNY and does use CPAP.  Patient was noted to have a very small elevation of his Cr to 1.4.  Given his hypoxia and shortness of air the hospital service was asked for observation admission and Pulmonary consult.  Patient was admitted to the hospital for observation patient underwent a walking oximetry test and actually did fine and does not require oxygen currently.  Patient had an echocardiogram done which appears okay however the final cardiology read is pending.  Patient was seen by pulmonary who recommend weight loss and outpatient follow-up for PFTs.  Also would recommend repeat outpatient sleep study as it has been many years since he has had 1.  Patient overall is feeling better and  requesting to be discharged home.  At this time patient will be discharged home in stable condition with no oxygen.  Again discussed weight loss as a significant contributing problem to all of his comorbidities.  Patient states that he will talk with his primary care regarding weight loss medication.  Patient will need outpatient follow-up with pulmonary for further outpatient testing.  Also discussed alcohol cessation. Cr slightly elevated at 1.4 will discontinue patient's hydrochlorothiazide and repeat BMP in the next week and can restart if needed.       DISCHARGE Follow Up Recommendations for labs and diagnostics:   Patient to follow-up with primary care in 1 week  Patient to follow-up with pulmonary in 1 week  Repeat UA in 1-2 weeks. If patient continues to have blood and protein may need referral to Urology       Day of Discharge     Vital Signs:  Temp:  [97.5 °F (36.4 °C)-98.8 °F (37.1 °C)] 98.8 °F (37.1 °C)  Heart Rate:  [] 76  Resp:  [18-20] 20  BP: (105-127)/(56-80) 127/80  Physical Exam:              Constitutional: Awake, alert, no acute distress.resting in bedside chair               Eyes: Pupils equal, sclerae anicteric, no conjunctival injection              HENT: NCAT, mucous membranes moist              Neck: Supple, no thyromegaly, no lymphadenopathy, trachea midline              Respiratory: Clear to auscultation bilaterally, nonlabored respirations               Cardiovascular: RRR, no murmurs              Gastrointestinal: Positive bowel sounds, soft, nontender, nondistended              Musculoskeletal: No bilateral ankle edema, no clubbing or cyanosis to extremities              Psychiatric: Appropriate affect, cooperative              Neurologic: No focal deficits noted               Skin: No rashes       Discharge Details        Discharge Medications        Continue These Medications        Instructions Start Date   allopurinol 100 MG tablet  Commonly known as: ZYLOPRIM   100 mg, Oral,  Daily      apixaban 5 MG tablet tablet  Commonly known as: Eliquis   5 mg, Oral, Every 12 Hours      atenolol 25 MG tablet  Commonly known as: TENORMIN   25 mg, Oral, 2 Times Daily      cyclobenzaprine 10 MG tablet  Commonly known as: FLEXERIL   10 mg, Oral, 3 Times Daily PRN      Diclofenac Sodium 1 % gel gel  Commonly known as: VOLTAREN   4 g, Topical, 4 Times Daily PRN      fluticasone 50 MCG/ACT nasal spray  Commonly known as: Flonase   2 sprays, Nasal, Daily      lisinopril 40 MG tablet  Commonly known as: PRINIVIL,ZESTRIL   40 mg, Oral, Daily      pantoprazole 40 MG EC tablet  Commonly known as: PROTONIX   40 mg, Oral, Daily             Stop These Medications      hydroCHLOROthiazide 50 MG tablet                  Allergies   Allergen Reactions   • Capsicum Annuum Extract & Derivative (Bell Pepper) [Capsicum] GI Intolerance   • Onion GI Intolerance   • Other GI Intolerance     Onions, peppers       Discharge Disposition:  Home or Self Care    Diet:  Hospital:  Diet Order   Procedures   • Diet: Regular/House; Fluid Consistency: Thin (IDDSI 0)       Discharge Activity: as tolerated       CODE STATUS:  Code Status and Medical Interventions: CPR (Attempt to Resuscitate); Full Support   Ordered at: 04/29/25 0944     Code Status (Patient has no pulse and is not breathing):    CPR (Attempt to Resuscitate)     Medical Interventions (Patient has pulse or is breathing):    Full Support         Future Appointments   Date Time Provider Department Center   6/18/2025 11:00 AM Jennifer Hobbs APRN [facility]   7/9/2025  8:00 AM [facility] PUL LAB ROOM 2 [facility]       Additional Instructions for the Follow-ups that You Need to Schedule       Discharge Follow-up with PCP   As directed       Currently Documented PCP:    Jennifer Hobbs APRN    PCP Phone Number:    [phone]     Follow Up Details: in 1 week        Discharge Follow-up with Specified Provider: Dr. Dawkins or Dr. Pozo; 1 Week   As directed      To:  Dr. Dawkins or Dr. Pozo   Follow Up: 1 Week                Pertinent  and/or Most Recent Results     PROCEDURES:   None     LAB RESULTS:      Lab 04/28/25  1250 04/28/25  0811 04/28/25  0648   WBC  --   --  6.48   HEMOGLOBIN  --   --  15.5   HEMATOCRIT  --   --  46.1   PLATELETS  --   --  251   NEUTROS ABS  --   --  3.45   IMMATURE GRANS (ABS)  --   --  0.05   LYMPHS ABS  --   --  1.39   MONOS ABS  --   --  1.24*   EOS ABS  --   --  0.30   MCV  --   --  91.3   SED RATE  --   --  16   CRP  --  2.21*  --    LACTATE 1.1  --   --          Lab 04/28/25  0648   SODIUM 130*   POTASSIUM 3.9   CHLORIDE 97*   CO2 18.5*   ANION GAP 14.5   BUN 32*   CREATININE 1.40*   EGFR 60.1   GLUCOSE 115*   CALCIUM 8.4*   MAGNESIUM 1.7   TSH 5.460*         Lab 04/28/25  0648   TOTAL PROTEIN 6.4   ALBUMIN 3.4*   GLOBULIN 3.0   ALT (SGPT) 21   AST (SGOT) 27   BILIRUBIN 0.3   ALK PHOS 55         Lab 04/28/25  0811 04/28/25  0648   PROBNP  --  565.6   HSTROP T 19 19                 Brief Urine Lab Results  (Last result in the past 365 days)        Color   Clarity   Blood   Leuk Est   Nitrite   Protein   CREAT   Urine HCG        04/28/25 1751 Yellow   Clear   Large (3+)   Moderate (2+)   Negative   30 mg/dL (1+)                 Microbiology Results (last 10 days)       Procedure Component Value - Date/Time    Respiratory Panel PCR w/COVID-19(SARS-CoV-2) ESTEPHANIA/LIBIA/VALENTIN/PAD/COR/JENNIE In-House, NP Swab in UTM/VTM, 2 HR TAT - Swab, Nasopharynx [[id number]]  (Normal) Collected: 04/28/25 1339    Lab Status: Final result Specimen: Swab from Nasopharynx Updated: 04/28/25 1435     ADENOVIRUS, PCR Not Detected     Coronavirus 229E Not Detected     Coronavirus HKU1 Not Detected     Coronavirus NL63 Not Detected     Coronavirus OC43 Not Detected     COVID19 Not Detected     Human Metapneumovirus Not Detected     Human Rhinovirus/Enterovirus Not Detected     Influenza A PCR Not Detected     Influenza B PCR Not Detected     Parainfluenza Virus 1 Not Detected      Parainfluenza Virus 2 Not Detected     Parainfluenza Virus 3 Not Detected     Parainfluenza Virus 4 Not Detected     RSV, PCR Not Detected     Bordetella pertussis pcr Not Detected     Bordetella parapertussis PCR Not Detected     Chlamydophila pneumoniae PCR Not Detected     Mycoplasma pneumo by PCR Not Detected    Narrative:      In the setting of a positive respiratory panel with a viral infection PLUS a negative procalcitonin without other underlying concern for bacterial infection, consider observing off antibiotics or discontinuation of antibiotics and continue supportive care. If the respiratory panel is positive for atypical bacterial infection (Bordetella pertussis, Chlamydophila pneumoniae, or Mycoplasma pneumoniae), consider antibiotic de-escalation to target atypical bacterial infection.            CT Angiogram Chest Pulmonary Embolism  Result Date: 4/28/2025  Impression: 1.Negative for pulmonary embolus. 2.No acute cardiopulmonary process. 3.Hepatic steatosis. Electronically Signed: Jr Christina MD  4/28/2025 8:48 AM EDT  Workstation ID: [id number]    XR Chest 1 View  Result Date: 4/28/2025  No active disease. Electronically Signed: Andrew Pack MD  4/28/2025 6:59 AM EDT  Workstation ID: [id number]                   Labs Pending at Discharge:  Pending Labs       Order Current Status    IgE In process              Time spent on Discharge including face to face service:  more than 35  minutes    Electronically signed by Rakan Pulido DO, 04/29/25, 1:21 PM EDT.

## 2025-04-29 NOTE — PROGRESS NOTES
Chief Complaint  No chief complaint on file.    Subjective      History of Present Illness         Past Medical History:   Diagnosis Date    Allergies     Anxiety 08/17/2021    Degeneration of lumbar intervertebral disc 02/05/2016    Fatigue 02/02/2023    GERD (gastroesophageal reflux disease)     Gout     High blood pressure     Hypertension     Impaired fasting glucose 03/15/2022    Male hypogonadism 07/28/2021    Mid back pain 02/24/2016    Obstructive sleep apnea 07/28/2021    Polycythemia, secondary 03/15/2022    Related to ETOH abuse     Pulmonary embolism     Tendinitis of elbow 07/16/2021    Thoracic back pain     Vitamin D deficiency 07/28/2021        Past Surgical History:   Procedure Laterality Date    CHOLECYSTECTOMY      GALLBLADDER SURGERY      HEMORRHOIDECTOMY      LUMBAR EPIDURAL INJECTION  2015    TONSILLECTOMY          Social History     Tobacco Use   Smoking Status Never    Passive exposure: Never   Smokeless Tobacco Current    Types: Chew        Patient Care Team:  Jennifer Hobbs APRN as PCP - General (Nurse Practitioner)    Allergies   Allergen Reactions    Capsicum Annuum Extract & Derivative (Bell Pepper) [Capsicum] GI Intolerance    Onion GI Intolerance    Other GI Intolerance     Onions, peppers        No current facility-administered medications for this visit.  No current outpatient medications on file.    Facility-Administered Medications Ordered in Other Visits:     allopurinol (ZYLOPRIM) tablet 100 mg, 100 mg, Oral, Daily, Rakan Pulido, DO, 100 mg at 04/29/25 0849    apixaban (ELIQUIS) tablet 5 mg, 5 mg, Oral, Q12H, Rakan Pulido, DO, 5 mg at 04/29/25 0849    arformoterol (BROVANA) nebulizer solution 15 mcg, 15 mcg, Nebulization, BID - RT, Dony Haas APRN, 15 mcg at 04/29/25 0723    atenolol (TENORMIN) tablet 25 mg, 25 mg, Oral, BID, Rakan Pulido, DO, 25 mg at 04/28/25 2015    sennosides-docusate (PERICOLACE) 8.6-50 MG per tablet 2 tablet, 2 tablet, Oral, BID PRN  **AND** polyethylene glycol (MIRALAX) packet 17 g, 17 g, Oral, Daily PRN **AND** bisacodyl (DULCOLAX) EC tablet 5 mg, 5 mg, Oral, Daily PRN **AND** bisacodyl (DULCOLAX) suppository 10 mg, 10 mg, Rectal, Daily PRN, Rakan Pulido DO    budesonide (PULMICORT) nebulizer solution 0.5 mg, 0.5 mg, Nebulization, BID - RT, Dony Haas APRN, 0.5 mg at 04/29/25 0723    cyclobenzaprine (FLEXERIL) tablet 10 mg, 10 mg, Oral, TID PRN, Rakan Pulido DO, 10 mg at 04/28/25 2019    ondansetron (ZOFRAN) injection 4 mg, 4 mg, Intravenous, Q6H PRN, Rakan Pulido DO    pantoprazole (PROTONIX) EC tablet 40 mg, 40 mg, Oral, Daily, Rakan Pulido DO, 40 mg at 04/29/25 0849    sodium chloride 0.9 % flush 10 mL, 10 mL, Intravenous, PRN, Kevin Bautista DO    sodium chloride 0.9 % flush 10 mL, 10 mL, Intravenous, Q12H, Rakan Pulido DO, 10 mL at 04/29/25 0850    sodium chloride 0.9 % flush 10 mL, 10 mL, Intravenous, PRN, Rakan Pulido,     sodium chloride 0.9 % infusion 40 mL, 40 mL, Intravenous, PRN, Rakan Pulido DO    Objective     There were no vitals filed for this visit.     Wt Readings from Last 3 Encounters:   04/28/25 (!) 162 kg (357 lb 5.9 oz)   02/25/25 (!) 150 kg (330 lb)   02/17/25 (!) 150 kg (330 lb 12.8 oz)        BP Readings from Last 3 Encounters:   04/29/25 119/63   04/06/25 145/87   02/25/25 141/89        Physical Exam      Physical Exam           Result Review   The following data was reviewed by: AIDAN Finnegan on 04/30/2025:  [x]  Tests & Results  []  Hospitalization/Emergency Department/Urgent Care  []  Internal/External Consultant Notes       Assessment and Plan     There are no diagnoses linked to this encounter.     Assessment & Plan                 Patient was given instructions and counseling regarding his condition or for health maintenance advice. Please see specific information pulled into the AVS if appropriate.     Follow Up   No follow-ups on file.    {TRISH [employer] Provider  Statement:[id number]}    Jennifer Hobbs, APRN

## 2025-04-29 NOTE — PROGRESS NOTES
Pulmonary / Critical Care Progress Note      Patient Name: Kevin Bradshaw  : 1970  MRN: [id number]  Attending:  Rakan Pulido DO  Date of admission: 2025    Subjective   Subjective   Follow-up for exertional dyspnea    Over past 24 hours:  Doing fair this morning  Remains on room air  6-minute walk test without desaturation  Brought in patient's CPAP machine used overnight    Review of Systems  General: Denied complaints  Cardiovascular:  Denied complaints  Respiratory: Denied complaints  Gastrointestinal: Denied complaints      Objective   Objective     Vitals:   Temp:  [97.5 °F (36.4 °C)-98.3 °F (36.8 °C)] 98.2 °F (36.8 °C)  Heart Rate:  [] 57  Resp:  [18-20] 20  BP: (105-125)/(56-91) 111/56    Physical Exam   Vital Signs Reviewed   General:  WDWN, Alert, NAD.    HEENT:  PERRL, EOMI.  OP, nares clear  Chest:  good aeration, clear to auscultation bilaterally, no work of breathing noted room air  CV: RRR, no MGR, pulses 2+, equal.  Abd:  Soft, NT, ND, + BS, obese  EXT:  no clubbing, no cyanosis, no edema  Neuro:  A&Ox3, CN grossly intact, no focal deficits.  Skin: No rashes or lesions noted      Result Review    Result Review:  I have personally reviewed the results from the time of this admission to 2025 09:20 EDT and agree with these findings:  []  Laboratory  []  Microbiology  []  Radiology  []  EKG/Telemetry   []  Cardiology/Vascular   []  Pathology  []  Old records  []  Other:  Most notable findings include:   -     Assessment & Plan   Assessment / Plan     Active Hospital Problems:  Active Hospital Problems    Diagnosis    • **Shortness of breath    • Essential hypertension          Impression:  Exertional dyspnea and hypoxia  LONNY on home CPAP  Question COPD  Question CHF  MYLA  Obesity class III with BMI of 48     Plan:  Remains on room air   6-minute walk test without desaturation  Chest x-ray without acute cardiopulmonary process  CT angio without PE.  No acute process  Micro  negative  2D echo with EF 50.3%  IgE not elevated; respiratory viral panel negative  Continue nebs and bronchopulmonary hygiene  Encourage weight loss  Encourage activity as tolerated  Follow-up in pulmonary clinic 1 to 2 weeks after discharge for outpatient PFTs    Mr. Bradshaw is using an benefiting from CPAP at home; continue home CPAP.        VTE Prophylaxis:  Pharmacologic & mechanical VTE prophylaxis orders are present.    Electronically signed by Rody Dawkins MD, 04/29/25, 2:23 PM EDT.

## 2025-04-29 NOTE — PROGRESS NOTES
Walking Oximetry Progress Note      Patient Name:  Kevin Bradshaw  YOB: 1970  Date of Procedure: 04/29/25              ROOM AIR BASELINE   SpO2% 97   Heart Rate 75     EXERCISE ON ROOM AIR SpO2% EXERCISE ON O2 LPM SpO2%   1 MINUTE 95 1 MINUTE     2 MINUTES 94 2 MINUTES     3 MINUTES 94 3 MINUTES     4 MINUTES 94 4 MINUTES     5 MINUTES 95 5 MINUTES     6 MINUTES 97 6 MINUTES                Time to Recovery  NA   SpO2% Post Exercise  97 on ROOM AIR.    HR Post Exercise  85     Comments:           Electronically signed by Josh Lozano CRT, 04/29/25, 11:43 AM EDT.

## 2025-04-29 NOTE — OUTREACH NOTE
Prep Survey      Flowsheet Row Responses   [facility] patient discharged from? [facility]   Is LACE score < 7 ? Yes   Eligibility [facility]   Date of Admission 04/28/25   Date of Discharge 04/29/25   Discharge Disposition Home-Health Care [facility]   Discharge diagnosis Shortness of breath   Does the patient have one of the following disease processes/diagnoses(primary or secondary)? Other   Does the patient have Home health ordered? No   Is there a DME ordered? Yes   What DME was ordered? [facility]I [facility]GHES   Prep survey completed? Yes            PRASHANTH TAYLOR - Registered Nurse

## 2025-04-29 NOTE — PLAN OF CARE
Goal Outcome Evaluation:  Plan of Care Reviewed With: patient        Progress: no change  Outcome Evaluation: Pt remains A&Ox4, on room air. No c/o pain or discomfort. Up ad chong, not a falls risk at this time. Pt wore home cpap throughout night. no needs at this time.

## 2025-04-30 ENCOUNTER — OFFICE VISIT (OUTPATIENT)
Dept: INTERNAL MEDICINE | Age: 55
End: 2025-04-30
Payer: COMMERCIAL

## 2025-04-30 ENCOUNTER — TRANSITIONAL CARE MANAGEMENT TELEPHONE ENCOUNTER (OUTPATIENT)
Dept: CALL CENTER | Facility: HOSPITAL | Age: 55
End: 2025-04-30
Payer: COMMERCIAL

## 2025-04-30 VITALS
TEMPERATURE: 96.1 F | HEART RATE: 74 BPM | HEIGHT: 68 IN | SYSTOLIC BLOOD PRESSURE: 122 MMHG | WEIGHT: 315 LBS | BODY MASS INDEX: 47.74 KG/M2 | OXYGEN SATURATION: 97 % | DIASTOLIC BLOOD PRESSURE: 80 MMHG

## 2025-04-30 DIAGNOSIS — R31.9 HEMATURIA, UNSPECIFIED TYPE: ICD-10-CM

## 2025-04-30 DIAGNOSIS — Z09 HOSPITAL DISCHARGE FOLLOW-UP: Primary | ICD-10-CM

## 2025-04-30 DIAGNOSIS — E66.01 OBESITY, MORBID, BMI 50 OR HIGHER: ICD-10-CM

## 2025-04-30 DIAGNOSIS — G47.33 OBSTRUCTIVE SLEEP APNEA: ICD-10-CM

## 2025-04-30 DIAGNOSIS — R06.02 SHORTNESS OF BREATH: ICD-10-CM

## 2025-04-30 DIAGNOSIS — K76.0 HEPATIC STEATOSIS: ICD-10-CM

## 2025-04-30 DIAGNOSIS — I10 ESSENTIAL HYPERTENSION: ICD-10-CM

## 2025-04-30 PROBLEM — I50.32 DIASTOLIC CHF, CHRONIC: Status: ACTIVE | Noted: 2025-04-30

## 2025-04-30 LAB
AORTIC DIMENSIONLESS INDEX: 0.77 (DI)
ASCENDING AORTA: 3.2 CM
AV MEAN PRESS GRAD SYS DOP V1V2: 3.8 MMHG
AV VMAX SYS DOP: 131.8 CM/SEC
BH CV ECHO MEAS - AO MAX PG: 6.9 MMHG
BH CV ECHO MEAS - AO ROOT DIAM: 3.1 CM
BH CV ECHO MEAS - AO V2 VTI: 28.1 CM
BH CV ECHO MEAS - AVA(I,D): 3.2 CM2
BH CV ECHO MEAS - EDV(MOD-SP2): 150.7 ML
BH CV ECHO MEAS - EDV(MOD-SP4): 138.6 ML
BH CV ECHO MEAS - EF(MOD-SP2): 50.2 %
BH CV ECHO MEAS - EF(MOD-SP4): 50.3 %
BH CV ECHO MEAS - ESV(MOD-SP2): 75.1 ML
BH CV ECHO MEAS - ESV(MOD-SP4): 68.9 ML
BH CV ECHO MEAS - IVS/LVPW: 0.75 CM
BH CV ECHO MEAS - IVSD: 0.9 CM
BH CV ECHO MEAS - LA DIMENSION: 5 CM
BH CV ECHO MEAS - LV DIASTOLIC VOL/BSA (35-75): 52.9 CM2
BH CV ECHO MEAS - LV MAX PG: 4 MMHG
BH CV ECHO MEAS - LV MEAN PG: 2.2 MMHG
BH CV ECHO MEAS - LV SYSTOLIC VOL/BSA (12-30): 26.3 CM2
BH CV ECHO MEAS - LV V1 MAX: 100 CM/SEC
BH CV ECHO MEAS - LV V1 VTI: 21.8 CM
BH CV ECHO MEAS - LVIDD: 4.2 CM
BH CV ECHO MEAS - LVIDS: 3.1 CM
BH CV ECHO MEAS - LVOT AREA: 4.2 CM2
BH CV ECHO MEAS - LVOT DIAM: 2.3 CM
BH CV ECHO MEAS - LVPWD: 1.2 CM
BH CV ECHO MEAS - MV A MAX VEL: 50.7 CM/SEC
BH CV ECHO MEAS - MV E MAX VEL: 100.1 CM/SEC
BH CV ECHO MEAS - MV E/A: 1.97
BH CV ECHO MEAS - SV(LVOT): 90.4 ML
BH CV ECHO MEAS - SV(MOD-SP2): 75.6 ML
BH CV ECHO MEAS - SV(MOD-SP4): 69.7 ML
BH CV ECHO MEAS - SVI(LVOT): 34.5 ML/M2
BH CV ECHO MEAS - SVI(MOD-SP2): 28.9 ML/M2
BH CV ECHO MEAS - SVI(MOD-SP4): 26.6 ML/M2
IVRT: 47 MS

## 2025-04-30 NOTE — OUTREACH NOTE
Call Center TCM Note      Flowsheet Row Responses   [facility] patient discharged from? [facility]   Does the patient have one of the following disease processes/diagnoses(primary or secondary)? Other   TCM attempt successful? Yes   Discharge diagnosis Shortness of breath   TCM call completed? Yes   Wrap up additional comments TCM completed on 4/30/25, PCP appointment within 2 business days of DC fulfills the TCM requirement, no call necessary.            NANCY ROBERSON - Registered Nurse    4/30/2025, 14:48 EDT

## 2025-04-30 NOTE — PROGRESS NOTES
Transitional Care Follow Up Visit  Subjective     Kevin Bradshaw is a 54 y.o. male who presents for a transitional care management visit.    Within 48 business hours after discharge our office contacted him via telephone to coordinate his care and needs.      I reviewed and discussed the details of that call along with the discharge summary, hospital problems, inpatient lab results, inpatient diagnostic studies, and consultation reports with Kevin.     Current outpatient and discharge medications have been reconciled for the patient.  Reviewed by: AIDAN Finnegan           4/29/2025     5:35 PM   Date of TCM Phone Call   [facility]   Date of Admission 4/28/2025   Date of Discharge 4/29/2025   [place] Disposition [place] [facility] [place]       Risk for Readmission (LACE) Score: 3 (4/29/2025  6:00 AM)       Course During Hospital Stay:    Kevin Bradshaw is a 54 y.o. male with a past medical history of morbid obesity, LONNY, history of PE on Eliquis, HTN, DDD who presented to the ER for shortness of breath ongoing for a few days. In The ER his workup was normal with normal CXR and CT of chest. Patient was noted to be hypoxic with ambulation of greater than 20 feet and had a desaturation down to 84%.  Patient does not smoke and has not history of any lung disease. He does have LONNY and does use CPAP.  Patient was noted to have a very small elevation of his Cr to 1.4.  Given his hypoxia and shortness of air the hospital service was asked for observation admission and Pulmonary consult.  Patient was admitted to the hospital for observation patient underwent a walking oximetry test and actually did fine and does not require oxygen currently.  Patient had an echocardiogram done which appears okay however the final cardiology read is pending.  Patient was seen by pulmonary who recommend weight loss and outpatient follow-up for PFTs.  Also would recommend repeat outpatient sleep study as it has been many  years since he has had 1.  Patient overall is feeling better and requesting to be discharged home.  At this time patient will be discharged home in stable condition with no oxygen.  Again discussed weight loss as a significant contributing problem to all of his comorbidities.  Patient states that he will talk with his primary care regarding weight loss medication.  Patient will need outpatient follow-up with pulmonary for further outpatient testing.  Also discussed alcohol cessation. Cr slightly elevated at 1.4 will discontinue patient's hydrochlorothiazide and repeat BMP in the next week and can restart if needed.      DISCHARGE Follow Up Recommendations for labs and diagnostics:   Patient to follow-up with primary care in 1 week  Patient to follow-up with pulmonary in 1 week  Repeat UA in 1-2 weeks. If patient continues to have blood and protein may need referral to Urology   History of Present Illness  The patient is a 54-year-old male who presents for a hospital follow-up.    He reports an improvement in his dyspnea and has scheduled an appointment with a pulmonologist. He was not released to work yesterday and was advised to follow up here. No chest pain is reported, but mild ankle edema is noted. He denies urinary issues, fevers, chills, cough, leg pain, abdominal pain, diarrhea, nausea, vomiting, or constipation. A history of hematuria is mentioned, which has been present for several years, but he has not yet consulted a urologist. He was advised to discontinue hydrochlorothiazide due to elevated liver enzymes, which he did not take this morning. The leg swelling was first noticed over the weekend. Current medications include atenolol 25 mg twice daily, lisinopril 40 mg once daily, and Eliquis. He had a cardiologist approximately 5 to 6 years ago.    Excessive consumption of sodas and beer is acknowledged, and he admits to not eating during the day. Attempts at weight loss independently have been  "unsuccessful, and a consistent diet has not been maintained. Breakfast is not consumed.    He is seeking clearance to return to work as a  for [employer], a job he does not find strenuous or stressful. No syncope is reported.    SOCIAL HISTORY  He drinks too many sodas and beer.     The following portions of the patient's history were reviewed and updated as appropriate: allergies, current medications, past family history, past medical history, past social history, past surgical history, and problem list.    Review of Systems   Constitutional:  Negative for chills and fever.   Respiratory:  Negative for cough and shortness of breath.    Cardiovascular:  Negative for chest pain.   Gastrointestinal:  Negative for abdominal pain, constipation and diarrhea.   Genitourinary:  Negative for difficulty urinating.   All other systems reviewed and are negative.    Objective     Vitals:    04/30/25 1309   BP: 122/80   Pulse: 74   Temp: 96.1 °F (35.6 °C)   SpO2: 97%   Weight: (!) 162 kg (358 lb)   Height: 172.7 cm (67.99\")         Physical Exam  Vitals reviewed.   Constitutional:       General: He is not in acute distress.     Appearance: He is obese.   Cardiovascular:      Rate and Rhythm: Normal rate and regular rhythm.   Pulmonary:      Effort: Pulmonary effort is normal.      Breath sounds: Normal breath sounds. No wheezing, rhonchi or rales.   Musculoskeletal:      Right lower leg: Swelling present.      Left lower leg: Swelling present.      Right ankle: Swelling present.      Left ankle: Swelling present.      Comments: Mild lower leg and ankle swelling bilateral   Skin:     General: Skin is warm and dry.   Neurological:      General: No focal deficit present.      Mental Status: He is alert.   Psychiatric:         Thought Content: Thought content normal.         Assessment & Plan       Assessment & Plan  1. Post-hospitalization follow-up.  - Blood pressure readings are within normal range today, even without " the administration of hydrochlorothiazide.  - Echocardiogram results are still pending. Liver enzymes have consistently been within normal limits. Elevated creatinine levels observed, but GFR remains satisfactory. Slight decrease in calcium and sodium levels, while potassium levels are stable. CT scan of the chest revealed fatty liver, but liver enzymes are normal.  - Advised to consult with a pulmonologist prior to resuming work. Comprehensive metabolic panel and repeat urinalysis to be conducted in approximately one week.  - Hydrochlorothiazide discontinued by hospitalist due to kidney function test. Blood tests and urinalysis ordered to monitor kidney function and resolution of hematuria.    2. Weight management.  - BMI exceeds 50. Weight recorded as 332 pounds in 01/2025, 330 pounds in 02/2025, and 358 pounds today.  - Counseled on the importance of weight loss and adherence to a healthy diet. Discussed potential side effects of Wegovy, including nausea, vomiting, constipation, and diarrhea. Advised to avoid fatty, fried, and greasy foods while on this medication.  - Administration of Wegovy injections initiated, starting with a low dose for the first 4 weeks, followed by a gradual increase until reaching a maximum dose of 2.4 mg. Instructed to consume a protein shake in the morning to prevent nausea and to keep Tums on hand for any breakthrough heartburn symptoms.  - Insurance requires prior authorization.  Prescription sent to [facility].    3. Work clearance.  - Provided with a note for his employer indicating that he may return to work without restrictions on Monday.  - If echocardiogram results are abnormal, he will be contacted and advised to consult with a cardiologist and cease working again.  - Pending echocardiogram results, work clearance granted with the understanding that any issues will prompt immediate reevaluation.    Follow-up  - Scheduled for a follow-up visit in 06/2025.    Patient was  given instructions and counseling regarding his condition or for health maintenance advice. Please see specific information pulled into the AVS if appropriate.     Follow Up   Return for Next scheduled follow up.    Patient or patient representative verbalized consent for the use of Ambient Listening during the visit with  AIDAN Finnegan for chart documentation. 4/30/2025  13:20 EDT    AIDAN Finnegan

## 2025-05-01 LAB
QT INTERVAL: 399 MS
QTC INTERVAL: 424 MS

## 2025-05-05 ENCOUNTER — TELEPHONE (OUTPATIENT)
Dept: INTERNAL MEDICINE | Age: 55
End: 2025-05-05
Payer: COMMERCIAL

## 2025-05-05 ENCOUNTER — APPOINTMENT (OUTPATIENT)
Dept: GENERAL RADIOLOGY | Facility: HOSPITAL | Age: 55
End: 2025-05-05
Payer: COMMERCIAL

## 2025-05-05 ENCOUNTER — HOSPITAL ENCOUNTER (EMERGENCY)
Facility: HOSPITAL | Age: 55
Discharge: HOME | End: 2025-05-05
Attending: EMERGENCY MEDICINE | Admitting: EMERGENCY MEDICINE
Payer: COMMERCIAL

## 2025-05-05 VITALS
RESPIRATION RATE: 17 BRPM | WEIGHT: 315 LBS | OXYGEN SATURATION: 94 % | BODY MASS INDEX: 47.74 KG/M2 | SYSTOLIC BLOOD PRESSURE: 147 MMHG | DIASTOLIC BLOOD PRESSURE: 71 MMHG | TEMPERATURE: 98.2 F | HEIGHT: 68 IN | HEART RATE: 55 BPM

## 2025-05-05 DIAGNOSIS — J20.9 ACUTE BRONCHITIS, UNSPECIFIED ORGANISM: ICD-10-CM

## 2025-05-05 DIAGNOSIS — R06.02 SHORTNESS OF BREATH: Primary | ICD-10-CM

## 2025-05-05 DIAGNOSIS — R60.0 BILATERAL LOWER EXTREMITY EDEMA: ICD-10-CM

## 2025-05-05 LAB
ALBUMIN SERPL-MCNC: 3.3 G/DL (ref 3.5–5.2)
ALBUMIN/GLOB SERPL: 1.1 G/DL
ALP SERPL-CCNC: 48 U/L (ref 39–117)
ALT SERPL W P-5'-P-CCNC: 19 U/L (ref 1–41)
ANION GAP SERPL CALCULATED.3IONS-SCNC: 13.1 MMOL/L (ref 5–15)
AST SERPL-CCNC: 27 U/L (ref 1–40)
BASOPHILS # BLD AUTO: 0.05 10*3/MM3 (ref 0–0.2)
BASOPHILS NFR BLD AUTO: 0.7 % (ref 0–1.5)
BILIRUB SERPL-MCNC: 0.4 MG/DL (ref 0–1.2)
BUN SERPL-MCNC: 28 MG/DL (ref 6–20)
BUN/CREAT SERPL: 21.1 (ref 7–25)
CALCIUM SPEC-SCNC: 8.4 MG/DL (ref 8.6–10.5)
CHLORIDE SERPL-SCNC: 104 MMOL/L (ref 98–107)
CO2 SERPL-SCNC: 19.9 MMOL/L (ref 22–29)
CREAT SERPL-MCNC: 1.33 MG/DL (ref 0.76–1.27)
DEPRECATED RDW RBC AUTO: 45.1 FL (ref 37–54)
EGFRCR SERPLBLD CKD-EPI 2021: 63.9 ML/MIN/1.73
EOSINOPHIL # BLD AUTO: 0.27 10*3/MM3 (ref 0–0.4)
EOSINOPHIL NFR BLD AUTO: 3.7 % (ref 0.3–6.2)
ERYTHROCYTE [DISTWIDTH] IN BLOOD BY AUTOMATED COUNT: 13.2 % (ref 12.3–15.4)
GEN 5 1HR TROPONIN T REFLEX: 22 NG/L
GLOBULIN UR ELPH-MCNC: 3.1 GM/DL
GLUCOSE SERPL-MCNC: 119 MG/DL (ref 65–99)
HCT VFR BLD AUTO: 45.2 % (ref 37.5–51)
HGB BLD-MCNC: 14.8 G/DL (ref 13–17.7)
HOLD SPECIMEN: NORMAL
IGE SERPL-ACNC: 339 IU/ML (ref 6–495)
IMM GRANULOCYTES # BLD AUTO: 0.03 10*3/MM3 (ref 0–0.05)
IMM GRANULOCYTES NFR BLD AUTO: 0.4 % (ref 0–0.5)
LYMPHOCYTES # BLD AUTO: 1.08 10*3/MM3 (ref 0.7–3.1)
LYMPHOCYTES NFR BLD AUTO: 14.8 % (ref 19.6–45.3)
MCH RBC QN AUTO: 30.1 PG (ref 26.6–33)
MCHC RBC AUTO-ENTMCNC: 32.7 G/DL (ref 31.5–35.7)
MCV RBC AUTO: 91.9 FL (ref 79–97)
MONOCYTES # BLD AUTO: 1.03 10*3/MM3 (ref 0.1–0.9)
MONOCYTES NFR BLD AUTO: 14.1 % (ref 5–12)
NEUTROPHILS NFR BLD AUTO: 4.85 10*3/MM3 (ref 1.7–7)
NEUTROPHILS NFR BLD AUTO: 66.3 % (ref 42.7–76)
NRBC BLD AUTO-RTO: 0 /100 WBC (ref 0–0.2)
NT-PROBNP SERPL-MCNC: 615 PG/ML (ref 0–900)
PLATELET # BLD AUTO: 284 10*3/MM3 (ref 140–450)
PMV BLD AUTO: 9.6 FL (ref 6–12)
POTASSIUM SERPL-SCNC: 4.9 MMOL/L (ref 3.5–5.2)
PROT SERPL-MCNC: 6.4 G/DL (ref 6–8.5)
QT INTERVAL: 391 MS
QTC INTERVAL: 412 MS
RBC # BLD AUTO: 4.92 10*6/MM3 (ref 4.14–5.8)
SODIUM SERPL-SCNC: 137 MMOL/L (ref 136–145)
TROPONIN T NUMERIC DELTA: 1 NG/L
TROPONIN T SERPL HS-MCNC: 21 NG/L
WBC NRBC COR # BLD AUTO: 7.31 10*3/MM3 (ref 3.4–10.8)
WHOLE BLOOD HOLD COAG: NORMAL
WHOLE BLOOD HOLD SPECIMEN: NORMAL

## 2025-05-05 PROCEDURE — 83880 ASSAY OF NATRIURETIC PEPTIDE: CPT | Performed by: EMERGENCY MEDICINE

## 2025-05-05 PROCEDURE — 80053 COMPREHEN METABOLIC PANEL: CPT | Performed by: EMERGENCY MEDICINE

## 2025-05-05 PROCEDURE — 96374 THER/PROPH/DIAG INJ IV PUSH: CPT

## 2025-05-05 PROCEDURE — 36415 COLL VENOUS BLD VENIPUNCTURE: CPT

## 2025-05-05 PROCEDURE — 25010000002 FUROSEMIDE PER 20 MG: Performed by: EMERGENCY MEDICINE

## 2025-05-05 PROCEDURE — 99284 EMERGENCY DEPT VISIT MOD MDM: CPT

## 2025-05-05 PROCEDURE — 85025 COMPLETE CBC W/AUTO DIFF WBC: CPT | Performed by: EMERGENCY MEDICINE

## 2025-05-05 PROCEDURE — 93005 ELECTROCARDIOGRAM TRACING: CPT

## 2025-05-05 PROCEDURE — 71045 X-RAY EXAM CHEST 1 VIEW: CPT

## 2025-05-05 PROCEDURE — 84484 ASSAY OF TROPONIN QUANT: CPT | Performed by: EMERGENCY MEDICINE

## 2025-05-05 PROCEDURE — 93005 ELECTROCARDIOGRAM TRACING: CPT | Performed by: EMERGENCY MEDICINE

## 2025-05-05 RX ORDER — FUROSEMIDE 20 MG/1
20 TABLET ORAL DAILY
Qty: 14 TABLET | Refills: 0 | Status: SHIPPED | OUTPATIENT
Start: 2025-05-05

## 2025-05-05 RX ORDER — SODIUM CHLORIDE 0.9 % (FLUSH) 0.9 %
10 SYRINGE (ML) INJECTION AS NEEDED
Status: DISCONTINUED | OUTPATIENT
Start: 2025-05-05 | End: 2025-05-05 | Stop reason: HOSPADM

## 2025-05-05 RX ORDER — FUROSEMIDE 10 MG/ML
60 INJECTION INTRAMUSCULAR; INTRAVENOUS ONCE
Status: COMPLETED | OUTPATIENT
Start: 2025-05-05 | End: 2025-05-05

## 2025-05-05 RX ORDER — AZITHROMYCIN 250 MG/1
TABLET, FILM COATED ORAL
Qty: 6 TABLET | Refills: 0 | Status: SHIPPED | OUTPATIENT
Start: 2025-05-05 | End: 2025-05-10 | Stop reason: HOSPADM

## 2025-05-05 RX ADMIN — FUROSEMIDE 60 MG: 10 INJECTION, SOLUTION INTRAMUSCULAR; INTRAVENOUS at 09:01

## 2025-05-05 NOTE — ED TRIAGE NOTES
Patient arrived to ED c/o SOB that started this morning. Patient was seen 1 week ago for SOB. Denies chest pain. Denies respiratory history. Patient states his leg are swelling as well

## 2025-05-05 NOTE — TELEPHONE ENCOUNTER
Patients insurance denied the W[place]ovy and is wondering where he needs to go from here? There was no reason for denial per the patient.

## 2025-05-05 NOTE — Clinical Note
[facility] EMERGENCY ROOM  [address] [place] [address]  Phone: [phone]  Fax: [phone]    Kevin Bradshaw was seen and treated in our emergency department on 5/5/2025.  He may return to work on 05/07/2025.  Return to work this Wednesday without restrictions.       Thank you for choosing [facility].    Paolo Gore MD

## 2025-05-05 NOTE — ED PROVIDER NOTES
Time: 8:10 AM EDT  Date of encounter:  5/5/2025  Independent Historian/Clinical History and Information was obtained by:   Patient    History is limited by: N/A    Chief Complaint: Shortness of breath with exertion, lower extremity edema        History of Present Illness:  Patient is a 54 y.o. year old male with history of hypertension who presents to the emergency department for evaluation of worsening shortness of breath all week with exertion, worsening lower extremity edema.    No chest pain or productive cough or fevers reported.  No history of CHF or PE.    Denies any new medications.    He was recently being worked up by his doctor and had an echocardiogram performed the other day but does not know of the results yet.  No calf pain.      Patient Care Team  Primary Care Provider: Jennifer Hobbs APRN    Past Medical History:     Allergies   Allergen Reactions    Capsicum Annuum Extract & Derivative (Bell Pepper) [Capsicum] GI Intolerance    Onion GI Intolerance    Other GI Intolerance     Onions, peppers     Past Medical History:   Diagnosis Date    Allergies     Anxiety 08/17/2021    Degeneration of lumbar intervertebral disc 02/05/2016    Fatigue 02/02/2023    GERD (gastroesophageal reflux disease)     Gout     High blood pressure     Hypertension     Impaired fasting glucose 03/15/2022    Male hypogonadism 07/28/2021    Mid back pain 02/24/2016    Obstructive sleep apnea 07/28/2021    Polycythemia, secondary 03/15/2022    Related to ETOH abuse     Pulmonary embolism     Tendinitis of elbow 07/16/2021    Thoracic back pain     Vitamin D deficiency 07/28/2021     Past Surgical History:   Procedure Laterality Date    CHOLECYSTECTOMY      GALLBLADDER SURGERY      HEMORRHOIDECTOMY      LUMBAR EPIDURAL INJECTION  2015    TONSILLECTOMY       Family History   Problem Relation Age of Onset    Hypertension Mother     Hypertension Father        Home Medications:  Prior to Admission medications    Medication Sig  Start Date End Date Taking? Authorizing Provider   allopurinol (ZYLOPRIM) 100 MG tablet Take 1 tablet by mouth Daily. 12/18/24   Jennifer Hobbs APRN   apixaban (Eliquis) 5 MG tablet tablet Take 1 tablet by mouth Every 12 (Twelve) Hours. 12/18/24   Jennifer Hobbs APRN   atenolol (TENORMIN) 25 MG tablet Take 1 tablet by mouth 2 (Two) Times a Day. 12/18/24   Jennifer Hobbs APRN   cyclobenzaprine (FLEXERIL) 10 MG tablet Take 1 tablet by mouth 3 (Three) Times a Day As Needed for Muscle Spasms. 4/6/25   Dasha Ovalles APRN   Diclofenac Sodium (VOLTAREN) 1 % gel gel Apply 4 g topically to the appropriate area as directed 4 (Four) Times a Day As Needed (as needed for pain).  Patient taking differently: Apply 4 g topically to the appropriate area as directed 4 (Four) Times a Day As Needed (as needed for pain). Right elbow 2/25/25   Cm Rosenthal MD   fluticasone (Flonase) 50 MCG/ACT nasal spray Administer 2 sprays into the nostril(s) as directed by provider Daily.  Patient taking differently: Administer 2 sprays into the nostril(s) as directed by provider Daily As Needed for Rhinitis or Allergies. 12/18/24   Jennifer Hobbs APRN   lisinopril (PRINIVIL,ZESTRIL) 40 MG tablet TAKE 1 TABLET BY MOUTH DAILY 1/8/25   Jennifer Hobbs APRN   pantoprazole (PROTONIX) 40 MG EC tablet Take 1 tablet by mouth Daily. 12/18/24   Jennifer Hobbs APRN   Semaglutide-Weight Management 0.25 MG/0.5ML solution auto-injector Inject 0.5 mL under the skin into the appropriate area as directed 1 (One) Time Per Week. 4/30/25   Jennifer Hobbs APRN   Semaglutide-Weight Management 0.5 MG/0.5ML solution auto-injector Inject 0.5 mL under the skin into the appropriate area as directed 1 (One) Time Per Week. 4/30/25   Jennifer Hobbs APRN        Social History:   Social History     Tobacco Use    Smoking status: Never     Passive exposure: Never    Smokeless tobacco: Current     Types: Chew   Vaping Use    Vaping status: Never  "Used   Substance Use Topics    Alcohol use: Yes     Alcohol/week: 28.0 standard drinks of alcohol     Types: 28 Standard drinks or equivalent per week     Comment: occais    Drug use: Never         Review of Systems:  Review of Systems   I performed a 10 point review of systems which was all negative, except for the positives found in the HPI above.  Physical Exam:  /71 (BP Location: Right arm, Patient Position: Lying)   Pulse 55   Temp 98.2 °F (36.8 °C) (Oral)   Resp 17   Ht 172.7 cm (68\")   Wt (!) 168 kg (370 lb 13 oz)   SpO2 94%   BMI 56.38 kg/m²     Physical Exam   General: Awake alert and in no obvious distress    HEENT: Head normocephalic atraumatic, eyes PERRLA EOMI, nose normal, oropharynx normal.    Neck: Supple full range of motion, no meningismus, no lymphadenopathy    Heart: Regular rate and rhythm, no murmurs or rubs, 2+ radial pulses bilaterally    Lungs: Clear to auscultation bilaterally without wheezes or crackles, no respiratory distress    Abdomen: Soft, obese, nontender, nondistended, no rebound or guarding    Skin: Warm, dry, no rash    Musculoskeletal: Normal range of motion, 3+ pitting bilateral lower extremity edema    Neurologic: Oriented x3, no motor deficits no sensory deficits    Psychiatric: Mood appears stable, no psychosis            Medical Decision Making:      Comorbidities that affect care:    Hypertension, Obesity    External Notes reviewed:    None      The following orders were placed and all results were independently analyzed by me:  Orders Placed This Encounter   Procedures    XR Chest 1 View    [place] Draw    Comprehensive Metabolic Panel    BNP    High Sensitivity Troponin T    CBC Auto Differential    High Sensitivity Troponin T 1Hr    NPO Diet NPO Type: Strict NPO    Undress & Gown    Continuous Pulse Oximetry    Vital Signs    Oxygen Therapy- Nasal Cannula; Titrate 1-6 LPM Per SpO2; 90 - 95%    ECG 12 Lead ED Triage Standing Order; SOA    Insert Peripheral " IV    CBC & Differential    Green Top (Gel)    Lavender Top    Gold Top - SST    Light Blue Top    Extra Tubes    Gray Top       Medications Given in the Emergency Department:  Medications   sodium chloride 0.9 % flush 10 mL (has no administration in time range)   furosemide (LASIX) injection 60 mg (60 mg Intravenous Given 5/5/25 0901)        ED Course:    ED Course as of 05/05/25 1107   Mon May 05, 2025   0704 EKG: I interpreted his twelve-lead EKG is normal sinus rhythm at 67 bpm, normal P waves, QRS showing low voltage, normal ST segments and T waves; no acute ischemia or ectopy, normal intervals. [VS]      ED Course User Index  [VS] Paolo Gore MD       Labs:    Lab Results (last 24 hours)       Procedure Component Value Units Date/Time    CBC & Differential [[id number]]  (Abnormal) Collected: 05/05/25 0721    Specimen: Blood Updated: 05/05/25 0729    Narrative:      The following orders were created for panel order CBC & Differential.  Procedure                               Abnormality         Status                     ---------                               -----------         ------                     CBC Auto Differential[[id number]]        Abnormal            Final result                 Please view results for these tests on the individual orders.    Comprehensive Metabolic Panel [[id number]]  (Abnormal) Collected: 05/05/25 0721    Specimen: Blood Updated: 05/05/25 0752     Glucose 119 mg/dL      BUN 28 mg/dL      Creatinine 1.33 mg/dL      Sodium 137 mmol/L      Potassium 4.9 mmol/L      Comment: Slight hemolysis detected by analyzer. Result may be falsely elevated.        Chloride 104 mmol/L      CO2 19.9 mmol/L      Calcium 8.4 mg/dL      Total Protein 6.4 g/dL      Albumin 3.3 g/dL      ALT (SGPT) 19 U/L      AST (SGOT) 27 U/L      Comment: Slight hemolysis detected by analyzer. Result may be falsely elevated.        Alkaline Phosphatase 48 U/L      Total Bilirubin 0.4 mg/dL      Globulin 3.1 gm/dL       A/G Ratio 1.1 g/dL      BUN/Creatinine Ratio 21.1     Anion Gap 13.1 mmol/L      eGFR 63.9 mL/min/1.73     Narrative:      GFR Categories in Chronic Kidney Disease (CKD)              GFR Category          GFR (mL/min/1.73)    Interpretation  G1                    90 or greater        Normal or high (1)  G2                    60-89                Mild decrease (1)  G3a                   45-59                Mild to moderate decrease  G3b                   30-44                Moderate to severe decrease  G4                    15-29                Severe decrease  G5                    14 or less           Kidney failure    (1)In the absence of evidence of kidney disease, neither GFR category G1 or G2 fulfill the criteria for CKD.    eGFR calculation 2021 CKD-EPI creatinine equation, which does not include race as a factor    BNP [[id number]]  (Normal) Collected: 05/05/25 0721    Specimen: Blood Updated: 05/05/25 0745     proBNP 615.0 pg/mL     Narrative:      This assay is used as an aid in the diagnosis of individuals suspected of having heart failure. It can be used as an aid in the diagnosis of acute decompensated heart failure (ADHF) in patients presenting with signs and symptoms of ADHF to the emergency department (ED). In addition, NT-proBNP of <300 pg/mL indicates ADHF is not likely.    Age Range Result Interpretation  NT-proBNP Concentration (pg/mL:      <50             Positive            >450                   Gray                 300-450                    Negative             <300    50-75           Positive            >900                  Gray                300-900                  Negative            <300      >75             Positive            >1800                  Gray                300-1800                  Negative            <300    High Sensitivity Troponin T [[id number]]  (Normal) Collected: 05/05/25 0721    Specimen: Blood Updated: 05/05/25 0747     HS Troponin T 21 ng/L     Narrative:       High Sensitive Troponin T Reference Range:  <14.0 ng/L- Negative Female for AMI  <22.0 ng/L- Negative Male for AMI  >=14 - Abnormal Female indicating possible myocardial injury.  >=22 - Abnormal Male indicating possible myocardial injury.   Clinicians would have to utilize clinical acumen, EKG, Troponin, and serial changes to determine if it is an Acute Myocardial Infarction or myocardial injury due to an underlying chronic condition.         CBC Auto Differential [[id number]]  (Abnormal) Collected: 05/05/25 0721    Specimen: Blood Updated: 05/05/25 0729     WBC 7.31 10*3/mm3      RBC 4.92 10*6/mm3      Hemoglobin 14.8 g/dL      Hematocrit 45.2 %      MCV 91.9 fL      MCH 30.1 pg      MCHC 32.7 g/dL      RDW 13.2 %      RDW-SD 45.1 fl      MPV 9.6 fL      Platelets 284 10*3/mm3      Neutrophil % 66.3 %      Lymphocyte % 14.8 %      Monocyte % 14.1 %      Eosinophil % 3.7 %      Basophil % 0.7 %      Immature Grans % 0.4 %      Neutrophils, Absolute 4.85 10*3/mm3      Lymphocytes, Absolute 1.08 10*3/mm3      Monocytes, Absolute 1.03 10*3/mm3      Eosinophils, Absolute 0.27 10*3/mm3      Basophils, Absolute 0.05 10*3/mm3      Immature Grans, Absolute 0.03 10*3/mm3      nRBC 0.0 /100 WBC     High Sensitivity Troponin T 1Hr [[id number]]  (Abnormal) Collected: 05/05/25 0848    Specimen: Blood Updated: 05/05/25 0917     HS Troponin T 22 ng/L      Troponin T Numeric Delta 1 ng/L     Narrative:      High Sensitive Troponin T Reference Range:  <14.0 ng/L- Negative Female for AMI  <22.0 ng/L- Negative Male for AMI  >=14 - Abnormal Female indicating possible myocardial injury.  >=22 - Abnormal Male indicating possible myocardial injury.   Clinicians would have to utilize clinical acumen, EKG, Troponin, and serial changes to determine if it is an Acute Myocardial Infarction or myocardial injury due to an underlying chronic condition.                  Imaging:    XR Chest 1 View  Result Date: 5/5/2025  XR CHEST 1 VW Date of  Exam: 5/5/2025 7:07 AM EDT Indication: SOA Triage Protocol Comparison: Chest radiograph dated 4/28/2025, CT chest dated 4/28/2025 Findings: The cardiomediastinal silhouette is within normal limits. Pulmonary vascularity appears normal. There is bilateral bronchial wall thickening which can be seen with bronchitis or reactive airway disease. There is no acute consolidation or pleural effusion. There is no pneumothorax. There are degenerative changes of the thoracic spine.     Impression: Bilateral bronchial wall thickening which can be seen with bronchitis or reactive airway disease. No acute consolidation. Electronically Signed: Kang Vizcaino MD  5/5/2025 7:16 AM EDT  Workstation ID: [id number]        Differential Diagnosis and Discussion:    Dyspnea: Differential diagnosis includes but is not limited to metabolic acidosis, neurological disorders, psychogenic, asthma, pneumothorax, upper airway obstruction, COPD, pneumonia, noncardiogenic pulmonary edema, interstitial lung disease, anemia, congestive heart failure, and pulmonary embolism  Edema: Based on the patient's history, signs, and symptoms, the differential diagnosis includes but is not limited to heart failure, kidney disease, liver disease, venous insufficiency, lymphedema, pregnancy, medications, allergic reactions.    PROCEDURES:    Labs were collected in the emergency department and all labs were reviewed and interpreted by me.  X-ray were performed in the emergency department and all X-ray impressions were independently interpreted by me.  An EKG was performed and the EKG was interpreted by me.    ECG 12 Lead ED Triage Standing Order; SOA   Preliminary Result   HEART RATE=67  bpm   RR Dvddvwat=531  ms   AL Blaqozcz=310  ms   P Horizontal Axis=27  deg   P Front Axis=45  deg   QRSD Interval=89  ms   QT Yxotaeve=033  ms   AOnZ=163  ms   QRS Axis=-5  deg   T Wave Axis=31  deg   - OTHERWISE NORMAL ECG -   Sinus rhythm   Low voltage, precordial leads   Date  and Time of Study:2025-05-05 06:58:25          Procedures    MDM     Amount and/or Complexity of Data Reviewed  Clinical lab tests: reviewed  Tests in the radiology section of CPT®: reviewed  Tests in the medicine section of CPT®: reviewed  Decide to obtain previous medical records or to obtain history from someone other than the patient: yes         This patient is a 54-year-old male presenting with dyspnea on exertion for 1 week and lower extremity pitting edema.    I considered CHF exacerbation possible versus kidney disease or liver disease.    I am checking lab work, EKG, chest x-ray.    I am initially diuresing him with IV Lasix 60 mg in the ED.      On reassessment, patient states he was breathing better after he diuresed in the ED.    Chest x-ray shows no overt pulmonary edema but concerning for possible bronchitis.    Lab work looks reassuring and no signs of infection are seen and normal troponin and proBNP noted.      I will start him on low-sodium diet and some Lasix as an outpatient have him follow-up with his doctor.  In addition, with bronchitis seen on chest x-ray I will err on the side of caution and start him on a Z-Armin.              Patient Care Considerations:          Consultants/Shared Management Plan:        Social Determinants of Health:    Patient is independent, reliable, and has access to care.       Disposition and Care Coordination:    Discharged: The patient is suitable and stable for discharge with no need for consideration of admission.    I have explained the patient´s condition, diagnoses and treatment plan based on the information available to me at this time. I have answered questions and addressed any concerns. The patient has a good  understanding of the patient´s diagnosis, condition, and treatment plan as can be expected at this point. The vital signs have been stable. The patient´s condition is stable and appropriate for discharge from the emergency department.      The  patient will pursue further outpatient evaluation with the primary care physician or other designated or consulting physician as outlined in the discharge instructions. They are agreeable to this plan of care and follow-up instructions have been explained in detail. The patient has received these instructions in written format and has expressed an understanding of the discharge instructions. The patient is aware that any significant change in condition or worsening of symptoms should prompt an immediate return to this or the closest emergency department or call to 911.  I have explained discharge medications and the need for follow up with the patient/caretakers. This was also printed in the discharge instructions. Patient was discharged with the following medications and follow up:      Medication List        New Prescriptions      azithromycin 250 MG tablet  Commonly known as: Zithromax Z-Armin  Take 2 tablets by mouth on day 1, then 1 tablet daily on days 2-5     furosemide 20 MG tablet  Commonly known as: LASIX  Take 1 tablet by mouth Daily.            Changed      Diclofenac Sodium 1 % gel gel  Commonly known as: VOLTAREN  Apply 4 g topically to the appropriate area as directed 4 (Four) Times a Day As Needed (as needed for pain).  What changed: additional instructions     fluticasone 50 MCG/ACT nasal spray  Commonly known as: Flonase  Administer 2 sprays into the nostril(s) as directed by provider Daily.  What changed:   when to take this  reasons to take this               Where to Get Your Medications        These medications were sent to [facility] [id number] - RICKIE HUGHES - [address] [facility] [address] - [phone] [facility] [phone]   [address] [place] [address]      Phone: [phone]   azithromycin 250 MG tablet  furosemide 20 MG tablet      Jennifer Hobbs, AIDAN  [address]  [address]  [place] [address]  [phone]    Call in 1 day  for a  follow-up appointment       Final diagnoses:   Shortness of breath   Bilateral lower extremity edema   Acute bronchitis, unspecified organism        ED Disposition       ED Disposition   Discharge    Condition   Stable    Comment   --               This medical record created using voice recognition software.             Paolo Gore MD  05/05/25 4348

## 2025-05-08 ENCOUNTER — HOSPITAL ENCOUNTER (OUTPATIENT)
Facility: HOSPITAL | Age: 55
Setting detail: OBSERVATION
Discharge: HOME | End: 2025-05-10
Attending: EMERGENCY MEDICINE | Admitting: INTERNAL MEDICINE
Payer: COMMERCIAL

## 2025-05-08 ENCOUNTER — APPOINTMENT (OUTPATIENT)
Dept: GENERAL RADIOLOGY | Facility: HOSPITAL | Age: 55
End: 2025-05-08
Payer: COMMERCIAL

## 2025-05-08 ENCOUNTER — APPOINTMENT (OUTPATIENT)
Dept: CARDIOLOGY | Facility: HOSPITAL | Age: 55
End: 2025-05-08
Payer: COMMERCIAL

## 2025-05-08 DIAGNOSIS — R26.2 DIFFICULTY WALKING: ICD-10-CM

## 2025-05-08 DIAGNOSIS — R07.9 ACUTE CHEST PAIN: Primary | ICD-10-CM

## 2025-05-08 PROBLEM — R07.89 OTHER CHEST PAIN: Status: ACTIVE | Noted: 2025-05-08

## 2025-05-08 LAB
ALBUMIN SERPL-MCNC: 3.6 G/DL (ref 3.5–5.2)
ALBUMIN/GLOB SERPL: 1.1 G/DL
ALP SERPL-CCNC: 54 U/L (ref 39–117)
ALT SERPL W P-5'-P-CCNC: 17 U/L (ref 1–41)
ANION GAP SERPL CALCULATED.3IONS-SCNC: 13.3 MMOL/L (ref 5–15)
AST SERPL-CCNC: 26 U/L (ref 1–40)
BACTERIA UR QL AUTO: ABNORMAL /HPF
BASOPHILS # BLD AUTO: 0.07 10*3/MM3 (ref 0–0.2)
BASOPHILS NFR BLD AUTO: 1 % (ref 0–1.5)
BILIRUB SERPL-MCNC: 0.4 MG/DL (ref 0–1.2)
BILIRUB UR QL STRIP: NEGATIVE
BUN SERPL-MCNC: 24 MG/DL (ref 6–20)
BUN/CREAT SERPL: 17.8 (ref 7–25)
CALCIUM SPEC-SCNC: 8.8 MG/DL (ref 8.6–10.5)
CHLORIDE SERPL-SCNC: 100 MMOL/L (ref 98–107)
CHOLEST SERPL-MCNC: 128 MG/DL (ref 0–200)
CLARITY UR: CLEAR
CO2 SERPL-SCNC: 20.7 MMOL/L (ref 22–29)
COLOR UR: YELLOW
CREAT SERPL-MCNC: 1.35 MG/DL (ref 0.76–1.27)
DEPRECATED RDW RBC AUTO: 43.5 FL (ref 37–54)
EGFRCR SERPLBLD CKD-EPI 2021: 62.8 ML/MIN/1.73
EOSINOPHIL # BLD AUTO: 0.26 10*3/MM3 (ref 0–0.4)
EOSINOPHIL NFR BLD AUTO: 3.8 % (ref 0.3–6.2)
ERYTHROCYTE [DISTWIDTH] IN BLOOD BY AUTOMATED COUNT: 13.2 % (ref 12.3–15.4)
GEN 5 1HR TROPONIN T REFLEX: 25 NG/L
GLOBULIN UR ELPH-MCNC: 3.2 GM/DL
GLUCOSE SERPL-MCNC: 113 MG/DL (ref 65–99)
GLUCOSE UR STRIP-MCNC: NEGATIVE MG/DL
HBA1C MFR BLD: 6.2 % (ref 4.8–5.6)
HCT VFR BLD AUTO: 46.3 % (ref 37.5–51)
HDLC SERPL-MCNC: 36 MG/DL (ref 40–60)
HGB BLD-MCNC: 15.5 G/DL (ref 13–17.7)
HGB UR QL STRIP.AUTO: ABNORMAL
HOLD SPECIMEN: NORMAL
HYALINE CASTS UR QL AUTO: ABNORMAL /LPF
IMM GRANULOCYTES # BLD AUTO: 0.05 10*3/MM3 (ref 0–0.05)
IMM GRANULOCYTES NFR BLD AUTO: 0.7 % (ref 0–0.5)
KETONES UR QL STRIP: NEGATIVE
LDLC SERPL CALC-MCNC: 73 MG/DL (ref 0–100)
LDLC/HDLC SERPL: 2.01 {RATIO}
LEUKOCYTE ESTERASE UR QL STRIP.AUTO: ABNORMAL
LIPASE SERPL-CCNC: 47 U/L (ref 13–60)
LYMPHOCYTES # BLD AUTO: 1.23 10*3/MM3 (ref 0.7–3.1)
LYMPHOCYTES NFR BLD AUTO: 17.7 % (ref 19.6–45.3)
MAGNESIUM SERPL-MCNC: 1.5 MG/DL (ref 1.6–2.6)
MCH RBC QN AUTO: 30.5 PG (ref 26.6–33)
MCHC RBC AUTO-ENTMCNC: 33.5 G/DL (ref 31.5–35.7)
MCV RBC AUTO: 91 FL (ref 79–97)
MONOCYTES # BLD AUTO: 0.98 10*3/MM3 (ref 0.1–0.9)
MONOCYTES NFR BLD AUTO: 14.1 % (ref 5–12)
NEUTROPHILS NFR BLD AUTO: 4.34 10*3/MM3 (ref 1.7–7)
NEUTROPHILS NFR BLD AUTO: 62.7 % (ref 42.7–76)
NITRITE UR QL STRIP: NEGATIVE
NRBC BLD AUTO-RTO: 0 /100 WBC (ref 0–0.2)
NT-PROBNP SERPL-MCNC: 772.7 PG/ML (ref 0–900)
PH UR STRIP.AUTO: 5.5 [PH] (ref 5–8)
PLATELET # BLD AUTO: 332 10*3/MM3 (ref 140–450)
PMV BLD AUTO: 10.2 FL (ref 6–12)
POTASSIUM SERPL-SCNC: 4.3 MMOL/L (ref 3.5–5.2)
PROT SERPL-MCNC: 6.8 G/DL (ref 6–8.5)
PROT UR QL STRIP: ABNORMAL
QT INTERVAL: 414 MS
QTC INTERVAL: 436 MS
RBC # BLD AUTO: 5.09 10*6/MM3 (ref 4.14–5.8)
RBC # UR STRIP: ABNORMAL /HPF
REF LAB TEST METHOD: ABNORMAL
SODIUM SERPL-SCNC: 134 MMOL/L (ref 136–145)
SP GR UR STRIP: 1.01 (ref 1–1.03)
SQUAMOUS #/AREA URNS HPF: ABNORMAL /HPF
TRIGL SERPL-MCNC: 98 MG/DL (ref 0–150)
TROPONIN T % DELTA: -4
TROPONIN T NUMERIC DELTA: -1 NG/L
TROPONIN T SERPL HS-MCNC: 26 NG/L
UROBILINOGEN UR QL STRIP: ABNORMAL
VLDLC SERPL-MCNC: 19 MG/DL (ref 5–40)
WBC # UR STRIP: ABNORMAL /HPF
WBC NRBC COR # BLD AUTO: 6.93 10*3/MM3 (ref 3.4–10.8)
WHOLE BLOOD HOLD COAG: NORMAL
WHOLE BLOOD HOLD SPECIMEN: NORMAL

## 2025-05-08 PROCEDURE — 96365 THER/PROPH/DIAG IV INF INIT: CPT

## 2025-05-08 PROCEDURE — 93005 ELECTROCARDIOGRAM TRACING: CPT | Performed by: EMERGENCY MEDICINE

## 2025-05-08 PROCEDURE — 25010000002 MAGNESIUM SULFATE 2 GM/50ML SOLUTION: Performed by: INTERNAL MEDICINE

## 2025-05-08 PROCEDURE — 71045 X-RAY EXAM CHEST 1 VIEW: CPT

## 2025-05-08 PROCEDURE — 25010000002 FUROSEMIDE PER 20 MG: Performed by: INTERNAL MEDICINE

## 2025-05-08 PROCEDURE — 83690 ASSAY OF LIPASE: CPT | Performed by: EMERGENCY MEDICINE

## 2025-05-08 PROCEDURE — 25010000002 SULFUR HEXAFLUORIDE MICROSPH 60.7-25 MG RECONSTITUTED SUSPENSION: Performed by: INTERNAL MEDICINE

## 2025-05-08 PROCEDURE — G0378 HOSPITAL OBSERVATION PER HR: HCPCS

## 2025-05-08 PROCEDURE — 83880 ASSAY OF NATRIURETIC PEPTIDE: CPT | Performed by: EMERGENCY MEDICINE

## 2025-05-08 PROCEDURE — 85025 COMPLETE CBC W/AUTO DIFF WBC: CPT | Performed by: EMERGENCY MEDICINE

## 2025-05-08 PROCEDURE — 96372 THER/PROPH/DIAG INJ SC/IM: CPT

## 2025-05-08 PROCEDURE — 99285 EMERGENCY DEPT VISIT HI MDM: CPT

## 2025-05-08 PROCEDURE — 99223 1ST HOSP IP/OBS HIGH 75: CPT | Performed by: INTERNAL MEDICINE

## 2025-05-08 PROCEDURE — 36415 COLL VENOUS BLD VENIPUNCTURE: CPT

## 2025-05-08 PROCEDURE — 84484 ASSAY OF TROPONIN QUANT: CPT | Performed by: EMERGENCY MEDICINE

## 2025-05-08 PROCEDURE — 96375 TX/PRO/DX INJ NEW DRUG ADDON: CPT

## 2025-05-08 PROCEDURE — 96376 TX/PRO/DX INJ SAME DRUG ADON: CPT

## 2025-05-08 PROCEDURE — 80061 LIPID PANEL: CPT | Performed by: INTERNAL MEDICINE

## 2025-05-08 PROCEDURE — 81001 URINALYSIS AUTO W/SCOPE: CPT | Performed by: INTERNAL MEDICINE

## 2025-05-08 PROCEDURE — 93306 TTE W/DOPPLER COMPLETE: CPT | Performed by: INTERNAL MEDICINE

## 2025-05-08 PROCEDURE — 25010000002 ENOXAPARIN PER 10 MG: Performed by: INTERNAL MEDICINE

## 2025-05-08 PROCEDURE — 99204 OFFICE O/P NEW MOD 45 MIN: CPT | Performed by: INTERNAL MEDICINE

## 2025-05-08 PROCEDURE — 87086 URINE CULTURE/COLONY COUNT: CPT | Performed by: INTERNAL MEDICINE

## 2025-05-08 PROCEDURE — 83735 ASSAY OF MAGNESIUM: CPT | Performed by: EMERGENCY MEDICINE

## 2025-05-08 PROCEDURE — 25010000002 FUROSEMIDE PER 20 MG: Performed by: EMERGENCY MEDICINE

## 2025-05-08 PROCEDURE — 83036 HEMOGLOBIN GLYCOSYLATED A1C: CPT | Performed by: INTERNAL MEDICINE

## 2025-05-08 PROCEDURE — 80053 COMPREHEN METABOLIC PANEL: CPT | Performed by: EMERGENCY MEDICINE

## 2025-05-08 PROCEDURE — 93306 TTE W/DOPPLER COMPLETE: CPT

## 2025-05-08 RX ORDER — MORPHINE SULFATE 2 MG/ML
2 INJECTION, SOLUTION INTRAMUSCULAR; INTRAVENOUS EVERY 4 HOURS PRN
Status: DISCONTINUED | OUTPATIENT
Start: 2025-05-08 | End: 2025-05-10 | Stop reason: HOSPADM

## 2025-05-08 RX ORDER — AMOXICILLIN 250 MG
2 CAPSULE ORAL 2 TIMES DAILY PRN
Status: DISCONTINUED | OUTPATIENT
Start: 2025-05-08 | End: 2025-05-10 | Stop reason: HOSPADM

## 2025-05-08 RX ORDER — ACETAMINOPHEN 160 MG/5ML
650 SOLUTION ORAL EVERY 4 HOURS PRN
Status: DISCONTINUED | OUTPATIENT
Start: 2025-05-08 | End: 2025-05-10 | Stop reason: HOSPADM

## 2025-05-08 RX ORDER — ENOXAPARIN SODIUM 100 MG/ML
0.7 INJECTION SUBCUTANEOUS EVERY 12 HOURS
Status: DISCONTINUED | OUTPATIENT
Start: 2025-05-08 | End: 2025-05-09

## 2025-05-08 RX ORDER — BISACODYL 5 MG/1
5 TABLET, DELAYED RELEASE ORAL DAILY PRN
Status: DISCONTINUED | OUTPATIENT
Start: 2025-05-08 | End: 2025-05-10 | Stop reason: HOSPADM

## 2025-05-08 RX ORDER — CYCLOBENZAPRINE HCL 10 MG
10 TABLET ORAL 3 TIMES DAILY PRN
Status: DISCONTINUED | OUTPATIENT
Start: 2025-05-08 | End: 2025-05-10 | Stop reason: HOSPADM

## 2025-05-08 RX ORDER — FUROSEMIDE 10 MG/ML
40 INJECTION INTRAMUSCULAR; INTRAVENOUS
Status: DISCONTINUED | OUTPATIENT
Start: 2025-05-08 | End: 2025-05-09

## 2025-05-08 RX ORDER — ALUMINA, MAGNESIA, AND SIMETHICONE 2400; 2400; 240 MG/30ML; MG/30ML; MG/30ML
15 SUSPENSION ORAL EVERY 6 HOURS PRN
Status: DISCONTINUED | OUTPATIENT
Start: 2025-05-08 | End: 2025-05-10 | Stop reason: HOSPADM

## 2025-05-08 RX ORDER — NALOXONE HCL 0.4 MG/ML
0.4 VIAL (ML) INJECTION
Status: DISCONTINUED | OUTPATIENT
Start: 2025-05-08 | End: 2025-05-10 | Stop reason: HOSPADM

## 2025-05-08 RX ORDER — NITROGLYCERIN 0.4 MG/1
0.4 TABLET SUBLINGUAL
Status: DISCONTINUED | OUTPATIENT
Start: 2025-05-08 | End: 2025-05-10 | Stop reason: HOSPADM

## 2025-05-08 RX ORDER — ATORVASTATIN CALCIUM 20 MG/1
20 TABLET, FILM COATED ORAL NIGHTLY
Status: DISCONTINUED | OUTPATIENT
Start: 2025-05-08 | End: 2025-05-10 | Stop reason: HOSPADM

## 2025-05-08 RX ORDER — ONDANSETRON 2 MG/ML
4 INJECTION INTRAMUSCULAR; INTRAVENOUS EVERY 6 HOURS PRN
Status: DISCONTINUED | OUTPATIENT
Start: 2025-05-08 | End: 2025-05-10 | Stop reason: HOSPADM

## 2025-05-08 RX ORDER — FUROSEMIDE 10 MG/ML
40 INJECTION INTRAMUSCULAR; INTRAVENOUS ONCE
Status: COMPLETED | OUTPATIENT
Start: 2025-05-08 | End: 2025-05-08

## 2025-05-08 RX ORDER — PANTOPRAZOLE SODIUM 40 MG/1
40 TABLET, DELAYED RELEASE ORAL DAILY
Status: DISCONTINUED | OUTPATIENT
Start: 2025-05-08 | End: 2025-05-10 | Stop reason: HOSPADM

## 2025-05-08 RX ORDER — ACETAMINOPHEN 650 MG/1
650 SUPPOSITORY RECTAL EVERY 4 HOURS PRN
Status: DISCONTINUED | OUTPATIENT
Start: 2025-05-08 | End: 2025-05-10 | Stop reason: HOSPADM

## 2025-05-08 RX ORDER — ENOXAPARIN SODIUM 100 MG/ML
1 INJECTION SUBCUTANEOUS EVERY 12 HOURS
Status: DISCONTINUED | OUTPATIENT
Start: 2025-05-08 | End: 2025-05-08 | Stop reason: DRUGHIGH

## 2025-05-08 RX ORDER — ACETAMINOPHEN 325 MG/1
650 TABLET ORAL EVERY 4 HOURS PRN
Status: DISCONTINUED | OUTPATIENT
Start: 2025-05-08 | End: 2025-05-10 | Stop reason: HOSPADM

## 2025-05-08 RX ORDER — ASPIRIN 81 MG/1
324 TABLET, CHEWABLE ORAL ONCE
Status: COMPLETED | OUTPATIENT
Start: 2025-05-08 | End: 2025-05-08

## 2025-05-08 RX ORDER — NITROGLYCERIN 0.4 MG/1
0.4 TABLET SUBLINGUAL
Status: DISCONTINUED | OUTPATIENT
Start: 2025-05-08 | End: 2025-05-08

## 2025-05-08 RX ORDER — TRAMADOL HYDROCHLORIDE 50 MG/1
50 TABLET ORAL EVERY 6 HOURS PRN
Status: DISCONTINUED | OUTPATIENT
Start: 2025-05-08 | End: 2025-05-10 | Stop reason: HOSPADM

## 2025-05-08 RX ORDER — NICOTINE 21 MG/24HR
1 PATCH, TRANSDERMAL 24 HOURS TRANSDERMAL
Status: DISCONTINUED | OUTPATIENT
Start: 2025-05-08 | End: 2025-05-10 | Stop reason: HOSPADM

## 2025-05-08 RX ORDER — OXYCODONE HYDROCHLORIDE 5 MG/1
5 TABLET ORAL EVERY 6 HOURS PRN
Status: DISCONTINUED | OUTPATIENT
Start: 2025-05-08 | End: 2025-05-10 | Stop reason: HOSPADM

## 2025-05-08 RX ORDER — SODIUM CHLORIDE 0.9 % (FLUSH) 0.9 %
10 SYRINGE (ML) INJECTION AS NEEDED
Status: DISCONTINUED | OUTPATIENT
Start: 2025-05-08 | End: 2025-05-08

## 2025-05-08 RX ORDER — SODIUM CHLORIDE 9 MG/ML
40 INJECTION, SOLUTION INTRAVENOUS AS NEEDED
Status: DISCONTINUED | OUTPATIENT
Start: 2025-05-08 | End: 2025-05-10 | Stop reason: HOSPADM

## 2025-05-08 RX ORDER — ALLOPURINOL 100 MG/1
100 TABLET ORAL DAILY
Status: DISCONTINUED | OUTPATIENT
Start: 2025-05-08 | End: 2025-05-10 | Stop reason: HOSPADM

## 2025-05-08 RX ORDER — MAGNESIUM SULFATE HEPTAHYDRATE 40 MG/ML
2 INJECTION, SOLUTION INTRAVENOUS ONCE
Status: COMPLETED | OUTPATIENT
Start: 2025-05-08 | End: 2025-05-08

## 2025-05-08 RX ORDER — ATENOLOL 25 MG/1
25 TABLET ORAL 2 TIMES DAILY
Status: DISCONTINUED | OUTPATIENT
Start: 2025-05-08 | End: 2025-05-10 | Stop reason: HOSPADM

## 2025-05-08 RX ORDER — SODIUM CHLORIDE 0.9 % (FLUSH) 0.9 %
10 SYRINGE (ML) INJECTION AS NEEDED
Status: DISCONTINUED | OUTPATIENT
Start: 2025-05-08 | End: 2025-05-10 | Stop reason: HOSPADM

## 2025-05-08 RX ORDER — SODIUM CHLORIDE 0.9 % (FLUSH) 0.9 %
10 SYRINGE (ML) INJECTION EVERY 12 HOURS SCHEDULED
Status: DISCONTINUED | OUTPATIENT
Start: 2025-05-08 | End: 2025-05-10 | Stop reason: HOSPADM

## 2025-05-08 RX ORDER — POLYETHYLENE GLYCOL 3350 17 G/17G
17 POWDER, FOR SOLUTION ORAL DAILY PRN
Status: DISCONTINUED | OUTPATIENT
Start: 2025-05-08 | End: 2025-05-10 | Stop reason: HOSPADM

## 2025-05-08 RX ORDER — BISACODYL 10 MG
10 SUPPOSITORY, RECTAL RECTAL DAILY PRN
Status: DISCONTINUED | OUTPATIENT
Start: 2025-05-08 | End: 2025-05-10 | Stop reason: HOSPADM

## 2025-05-08 RX ORDER — CALCIUM CARBONATE 500 MG/1
2 TABLET, CHEWABLE ORAL 2 TIMES DAILY PRN
Status: DISCONTINUED | OUTPATIENT
Start: 2025-05-08 | End: 2025-05-10 | Stop reason: HOSPADM

## 2025-05-08 RX ADMIN — Medication 10 ML: at 20:20

## 2025-05-08 RX ADMIN — ASPIRIN 324 MG: 81 TABLET, CHEWABLE ORAL at 07:00

## 2025-05-08 RX ADMIN — NITROGLYCERIN 0.4 MG: 0.4 TABLET SUBLINGUAL at 07:28

## 2025-05-08 RX ADMIN — ENOXAPARIN SODIUM 120 MG: 100 INJECTION SUBCUTANEOUS at 17:03

## 2025-05-08 RX ADMIN — FUROSEMIDE 40 MG: 10 INJECTION, SOLUTION INTRAMUSCULAR; INTRAVENOUS at 17:03

## 2025-05-08 RX ADMIN — FUROSEMIDE 40 MG: 10 INJECTION, SOLUTION INTRAMUSCULAR; INTRAVENOUS at 07:34

## 2025-05-08 RX ADMIN — MAGNESIUM SULFATE HEPTAHYDRATE 2 G: 40 INJECTION, SOLUTION INTRAVENOUS at 15:17

## 2025-05-08 RX ADMIN — ATENOLOL 25 MG: 25 TABLET ORAL at 15:17

## 2025-05-08 RX ADMIN — Medication 10 ML: at 15:19

## 2025-05-08 RX ADMIN — ALLOPURINOL 100 MG: 100 TABLET ORAL at 15:17

## 2025-05-08 RX ADMIN — SULFUR HEXAFLUORIDE 3 ML: KIT at 15:25

## 2025-05-08 RX ADMIN — ATENOLOL 25 MG: 25 TABLET ORAL at 20:19

## 2025-05-08 RX ADMIN — PANTOPRAZOLE SODIUM 40 MG: 40 TABLET, DELAYED RELEASE ORAL at 15:19

## 2025-05-08 NOTE — CONSULTS
[facility]   INTERVENTIONAL CARDIOLOGY CONSULT NOTE    Patient Name: Kevin Bradshaw  : 1970  MRN: [id number]    Primary Care Physician:  Jennifer Hobbs APRN  Date of admission: 2025    Subjective   Subjective     Chief Complaint: Chest pain    HPI:  Kevin Bradshaw is a 54 y.o. male with history of HTN, obesity, PE on anticoagulation presented with chest pain episode, substernal, 8/10, lasted for few minutes. Troponin trended 22-26-25, proBNP normal, serum creatinine mildly elevated at 1.35. EKG showed sinus rhythm with no acute ST/T changes. Patient denied chest pain at the time of history taking. CT PE was negative for PE on 2025. Cardiology consulted for further evaluation.     Review of Systems  Negative except as mentioned in HPI    Personal History     Past Medical History:   Diagnosis Date    Allergies     Anxiety 2021    Degeneration of lumbar intervertebral disc 2016    Fatigue 2023    GERD (gastroesophageal reflux disease)     Gout     High blood pressure     Hypertension     Impaired fasting glucose 03/15/2022    Male hypogonadism 2021    Mid back pain 2016    Obstructive sleep apnea 2021    Polycythemia, secondary 03/15/2022    Related to ETOH abuse     Pulmonary embolism     Tendinitis of elbow 2021    Thoracic back pain     Vitamin D deficiency 2021       Past Surgical History:   Procedure Laterality Date    CHOLECYSTECTOMY      GALLBLADDER SURGERY      HEMORRHOIDECTOMY      LUMBAR EPIDURAL INJECTION  2015    TONSILLECTOMY         Family History: family history includes Hypertension in his father and mother. Otherwise pertinent FHx was reviewed and not pertinent to current issue.    Social History:  reports that he has never smoked. He has never been exposed to tobacco smoke. His smokeless tobacco use includes chew. He reports current alcohol use of about 28.0 standard drinks of alcohol per week. He reports that he does not use  drugs.    Home Medications:  Diclofenac Sodium, Semaglutide-Weight Management, allopurinol, apixaban, atenolol, azithromycin, cyclobenzaprine, fluticasone, furosemide, lisinopril, and pantoprazole    Allergies:  Allergies   Allergen Reactions    Capsicum Annuum Extract & Derivative (Bell Pepper) [Capsicum] GI Intolerance    Onion GI Intolerance    Other GI Intolerance     Onions, peppers       Objective   Objective     Vitals:   Temp:  [98 °F (36.7 °C)] 98 °F (36.7 °C)  Heart Rate:  [51-71] 51  Resp:  [19-24] 20  BP: (118-150)/(55-75) 136/55    Physical Exam    Constitutional: Awake, alert, morbid obesity   Neck: No JVD   Respiratory: Clear to auscultation bilaterally, nonlabored respirations    Cardiovascular: Regular rhythm and normal rate, no murmurs.   Gastrointestinal: Soft, nontender, nondistended   Musculoskeletal: Lower extremity edema absent   Psychiatric: Appropriate affect, cooperative      Result Review    Result Review:  I have personally reviewed the results from the time of this admission to 5/8/2025 15:31 EDT and agree with these findings:  [x]  Laboratory  []  Microbiology  []  Radiology  [x]  EKG/Telemetry   [x]  Cardiology/Vascular   []  Pathology  []  Old records      ECG 12 Lead ED Triage Standing Order; Chest Pain   Preliminary Result   HEART RATE=67  bpm   RR Qwcxheec=702  ms   IL Inuyhtuh=936  ms   P Horizontal Axis=18  deg   P Front Axis=45  deg   QRSD Interval=98  ms   QT Bkkomvzn=690  ms   EBbI=438  ms   QRS Axis=5  deg   T Wave Axis=32  deg   - OTHERWISE NORMAL ECG -   Sinus rhythm   Low voltage, precordial leads   Date and Time of Study:2025-05-08 06:38:37        Results for orders placed during the hospital encounter of 04/28/25    Adult Transthoracic Echo Complete W/ Cont if Necessary Per Protocol    Interpretation Summary    Left ventricular ejection fraction appears to be 51 - 55%.ECHO Contrast used.    Left ventricular diastolic function is consistent with (grade II w/high LAP)  pseudonormalization.    The left atrial cavity is severely dilated.    Technically Difficult Study.    No results found for this or any previous visit.    Lab Results   Component Value Date    CKTOTAL 55 (L) 02/15/2021    TROPONINT 25 (H) 05/08/2025     A1C Last 3 Results          9/14/2024    08:22   HGBA1C Last 3 Results   Hemoglobin A1C 5.80          ASCVD SCORE  The ASCVD Risk score (Sourav COLBERT, et al., 2019) failed to calculate for the following reasons:    The valid total cholesterol range is 130 to 320 mg/dL        Assessment & Plan   Assessment / Plan     Active Hospital Problems:  Active Hospital Problems    Diagnosis     **Chest pain     Other chest pain        Plan:   - Patient with atypical chest pain with cardiac risk factors.   - Echo shows preserved LVEF  - Stress test in AM. Discussed with referring physician in ED.       CODE STATUS:    Code Status (Patient has no pulse and is not breathing): CPR (Attempt to Resuscitate)  Medical Interventions (Patient has pulse or is breathing): Full Support       Neisha San MD, [facility]   Interventional Cardiology      I spent 45 minutes caring for this patient on this date of service. This time includes time spent by me in the following activities:preparing for the visit, reviewing tests, obtaining and/or reviewing a separately obtained history, performing a medically appropriate examination and/or evaluation , counseling and educating the patient/family/caregiver, ordering medications, tests, or procedures, referring and communicating with other health care professionals , documenting information in the medical record, independently interpreting results and communicating that information with the patient/family/caregiver, and care coordination.     This is an acute or chronic illness that poses a threat to life or bodily function. The above treatment plan involves a high risk of complications and/or mortality of patient management.  The patient was seen and  examined. Work by the provider also included review and/or ordering of lab tests, review and/or ordering of radiology tests, review and/or ordering of medicine tests, discussion with other physicians or providers, independent review of data, obtaining old records, review/summation of old records, and/or other review.  I have reviewed the family history, social history, and past medical history for this patient. Previous information and data has been reviewed and updated as needed. I have reviewed and verified the chief complaint, history, and other documentation.  The previous observations, recommendations, and conclusions were reviewed including those of other providers.   The plan was discussed with the patient and/or family or Staff. Questions were answered.

## 2025-05-08 NOTE — PLAN OF CARE
Goal Outcome Evaluation:  Plan of Care Reviewed With: patient           Outcome Evaluation: @1115 Patient arrived to 4MTU from ED.  Patient A+Ox4, denies chest pain.  Patient walking to/from bathroom without any assistance.  Attending notified of patient's arrival

## 2025-05-08 NOTE — LETTER
May 10, 2025     Patient: Kevin Bradshaw   YOB: 1970   Date of Visit: 5/8/2025       To Whom It May Concern:    It is my medical opinion that Kevin Bradshaw may return to work on Monday, 5/12/2025 with no restrictions.           Sincerely,  Anthony Molina MD

## 2025-05-08 NOTE — ED PROVIDER NOTES
Time: 7:05 AM EDT  Date of encounter:  5/8/2025  Independent Historian/Clinical History and Information was obtained by:   Patient    History is limited by: N/A    Chief Complaint: Chest pain      History of Present Illness:  Patient is a 54 y.o. year old male who presents to the emergency department for evaluation of chest pain.  Patient states that this morning he woke up around 3:00 and was having pain in his chest.  Reports he has had pressure mainly across his chest this morning.  Does report some shortness of breath.  States he does have a history of hypertension, obesity, PE.  States he has been compliant with his Eliquis and has not missed any doses.  He was in the hospital couple weeks ago per the patient with hypoxia.  Denies any fever.  Has not had a stress test or heart cath per the patient.  Does report weight gain as well.  No other complaints this time.      Patient Care Team  Primary Care Provider: Jennifer Hobbs APRN    Past Medical History:     Allergies   Allergen Reactions    Capsicum Annuum Extract & Derivative (Bell Pepper) [Capsicum] GI Intolerance    Onion GI Intolerance    Other GI Intolerance     Onions, peppers     Past Medical History:   Diagnosis Date    Allergies     Anxiety 08/17/2021    Degeneration of lumbar intervertebral disc 02/05/2016    Fatigue 02/02/2023    GERD (gastroesophageal reflux disease)     Gout     High blood pressure     Hypertension     Impaired fasting glucose 03/15/2022    Male hypogonadism 07/28/2021    Mid back pain 02/24/2016    Obstructive sleep apnea 07/28/2021    Polycythemia, secondary 03/15/2022    Related to ETOH abuse     Pulmonary embolism     Tendinitis of elbow 07/16/2021    Thoracic back pain     Vitamin D deficiency 07/28/2021     Past Surgical History:   Procedure Laterality Date    CHOLECYSTECTOMY      GALLBLADDER SURGERY      HEMORRHOIDECTOMY      LUMBAR EPIDURAL INJECTION  2015    TONSILLECTOMY       Family History   Problem Relation Age of  Onset    Hypertension Mother     Hypertension Father        Home Medications:  Prior to Admission medications    Medication Sig Start Date End Date Taking? Authorizing Provider   allopurinol (ZYLOPRIM) 100 MG tablet Take 1 tablet by mouth Daily. 12/18/24   Jennifer Hobbs APRN   apixaban (Eliquis) 5 MG tablet tablet Take 1 tablet by mouth Every 12 (Twelve) Hours. 12/18/24   Jennifer Hobbs APRN   atenolol (TENORMIN) 25 MG tablet Take 1 tablet by mouth 2 (Two) Times a Day. 12/18/24   Jennifer Hobbs APRN   azithromycin (Zithromax Z-Armin) 250 MG tablet Take 2 tablets by mouth on day 1, then 1 tablet daily on days 2-5 5/5/25   Paloo Gore MD   cyclobenzaprine (FLEXERIL) 10 MG tablet Take 1 tablet by mouth 3 (Three) Times a Day As Needed for Muscle Spasms. 4/6/25   Dasha Ovalles APRN   Diclofenac Sodium (VOLTAREN) 1 % gel gel Apply 4 g topically to the appropriate area as directed 4 (Four) Times a Day As Needed (as needed for pain).  Patient taking differently: Apply 4 g topically to the appropriate area as directed 4 (Four) Times a Day As Needed (as needed for pain). Right elbow 2/25/25   Cm Rosenthal MD   fluticasone (Flonase) 50 MCG/ACT nasal spray Administer 2 sprays into the nostril(s) as directed by provider Daily.  Patient taking differently: Administer 2 sprays into the nostril(s) as directed by provider Daily As Needed for Rhinitis or Allergies. 12/18/24   Jennifer Hobbs APRN   furosemide (LASIX) 20 MG tablet Take 1 tablet by mouth Daily. 5/5/25   Paolo Gore MD   lisinopril (PRINIVIL,ZESTRIL) 40 MG tablet TAKE 1 TABLET BY MOUTH DAILY 1/8/25   Jennifer Hobbs APRN   pantoprazole (PROTONIX) 40 MG EC tablet Take 1 tablet by mouth Daily. 12/18/24   Jennifer Hobbs APRN   Semaglutide-Weight Management 0.25 MG/0.5ML solution auto-injector Inject 0.5 mL under the skin into the appropriate area as directed 1 (One) Time Per Week. 4/30/25   Jennifer Hobbs, AIDAN   Semaglutide-Weight  "Management 0.5 MG/0.5ML solution auto-injector Inject 0.5 mL under the skin into the appropriate area as directed 1 (One) Time Per Week. 4/30/25   Jennifer Hobbs APRN        Social History:   Social History     Tobacco Use    Smoking status: Never     Passive exposure: Never    Smokeless tobacco: Current     Types: Chew   Vaping Use    Vaping status: Never Used   Substance Use Topics    Alcohol use: Yes     Alcohol/week: 28.0 standard drinks of alcohol     Types: 28 Standard drinks or equivalent per week     Comment: occais    Drug use: Never         Review of Systems:  Review of Systems     Physical Exam:  /69   Pulse 55   Temp 98 °F (36.7 °C) (Oral)   Resp 22   Ht 172.7 cm (68\")   Wt (!) 166 kg (366 lb 10 oz)   SpO2 94%   BMI 55.75 kg/m²     Physical Exam  Vitals and nursing note reviewed.   Constitutional:       Appearance: Normal appearance. He is obese.   HENT:      Head: Normocephalic and atraumatic.   Eyes:      General: No scleral icterus.  Cardiovascular:      Rate and Rhythm: Normal rate and regular rhythm.      Heart sounds: Normal heart sounds.   Pulmonary:      Effort: Pulmonary effort is normal.      Breath sounds: Normal breath sounds.   Abdominal:      Palpations: Abdomen is soft.      Tenderness: There is no abdominal tenderness.   Musculoskeletal:         General: Normal range of motion.      Cervical back: Normal range of motion.      Right lower leg: Edema present.      Left lower leg: Edema present.   Skin:     Findings: No rash.   Neurological:      General: No focal deficit present.      Mental Status: He is alert.                    Medical Decision Making:      Comorbidities that affect care:    Pulmonary emboli, Hypertension, Obesity    External Notes reviewed:  Reviewed ED note from 5/5/2025, reviewed note from 4/30/2025        The following orders were placed and all results were independently analyzed by me:  Orders Placed This Encounter   Procedures    XR Chest 1 View    " [place] Draw    High Sensitivity Troponin T    Comprehensive Metabolic Panel    Lipase    BNP    Magnesium    CBC Auto Differential    High Sensitivity Troponin T 1Hr    NPO Diet NPO Type: Strict NPO    Undress & Gown    Continuous Pulse Oximetry    Inpatient Cardiology Consult    Inpatient Hospitalist Consult    Oxygen Therapy- Nasal Cannula; Titrate 1-6 LPM Per SpO2; 90 - 95%    ECG 12 Lead ED Triage Standing Order; Chest Pain    ECG 12 Lead ED Triage Standing Order; Chest Pain    Insert Peripheral IV    CBC & Differential    Green Top (Gel)    Lavender Top    Gold Top - SST    Light Blue Top    Extra Tubes    Gold Top - SST       Medications Given in the Emergency Department:  Medications   sodium chloride 0.9 % flush 10 mL (has no administration in time range)   nitroglycerin (NITROSTAT) SL tablet 0.4 mg (0.4 mg Sublingual Given 5/8/25 0728)   aspirin chewable tablet 324 mg (324 mg Oral Given 5/8/25 0700)   furosemide (LASIX) injection 40 mg (40 mg Intravenous Given 5/8/25 0734)        ED Course:    ED Course as of 05/08/25 0849   Thu May 08, 2025   0646 EKG inter by me  Time: 0 638  Heart rate 67  Sinus, normal QRS, normal axis, no acute ischemia [MA]   0813 Spoke with Dr. San.  Admit to the hospitalist.  Hold off on heparin if has taken Eliquis. [MA]   0843 Spoke with Dr. Molina who agrees to admit.  [MA]      ED Course User Index  [MA] Curly Lawrence MD       Labs:    Lab Results (last 24 hours)       Procedure Component Value Units Date/Time    High Sensitivity Troponin T [[id number]]  (Abnormal) Collected: 05/08/25 0644    Specimen: Blood Updated: 05/08/25 0713     HS Troponin T 26 ng/L     Narrative:      High Sensitive Troponin T Reference Range:  <14.0 ng/L- Negative Female for AMI  <22.0 ng/L- Negative Male for AMI  >=14 - Abnormal Female indicating possible myocardial injury.  >=22 - Abnormal Male indicating possible myocardial injury.   Clinicians would have to utilize clinical acumen, EKG,  Troponin, and serial changes to determine if it is an Acute Myocardial Infarction or myocardial injury due to an underlying chronic condition.         CBC & Differential [[id number]]  (Abnormal) Collected: 05/08/25 0644    Specimen: Blood Updated: 05/08/25 0653    Narrative:      The following orders were created for panel order CBC & Differential.  Procedure                               Abnormality         Status                     ---------                               -----------         ------                     CBC Auto Differential[[id number]]        Abnormal            Final result                 Please view results for these tests on the individual orders.    Comprehensive Metabolic Panel [[id number]]  (Abnormal) Collected: 05/08/25 0644    Specimen: Blood Updated: 05/08/25 0713     Glucose 113 mg/dL      BUN 24 mg/dL      Creatinine 1.35 mg/dL      Sodium 134 mmol/L      Potassium 4.3 mmol/L      Comment: Slight hemolysis detected by analyzer. Result may be falsely elevated.        Chloride 100 mmol/L      CO2 20.7 mmol/L      Calcium 8.8 mg/dL      Total Protein 6.8 g/dL      Albumin 3.6 g/dL      ALT (SGPT) 17 U/L      AST (SGOT) 26 U/L      Comment: Slight hemolysis detected by analyzer. Result may be falsely elevated.        Alkaline Phosphatase 54 U/L      Total Bilirubin 0.4 mg/dL      Globulin 3.2 gm/dL      A/G Ratio 1.1 g/dL      BUN/Creatinine Ratio 17.8     Anion Gap 13.3 mmol/L      eGFR 62.8 mL/min/1.73     Narrative:      GFR Categories in Chronic Kidney Disease (CKD)              GFR Category          GFR (mL/min/1.73)    Interpretation  G1                    90 or greater        Normal or high (1)  G2                    60-89                Mild decrease (1)  G3a                   45-59                Mild to moderate decrease  G3b                   30-44                Moderate to severe decrease  G4                    15-29                Severe decrease  G5                    14 or less            Kidney failure    (1)In the absence of evidence of kidney disease, neither GFR category G1 or G2 fulfill the criteria for CKD.    eGFR calculation 2021 CKD-EPI creatinine equation, which does not include race as a factor    Lipase [[id number]]  (Normal) Collected: 05/08/25 0644    Specimen: Blood Updated: 05/08/25 0713     Lipase 47 U/L     BNP [[id number]]  (Normal) Collected: 05/08/25 0644    Specimen: Blood Updated: 05/08/25 0710     proBNP 772.7 pg/mL     Narrative:      This assay is used as an aid in the diagnosis of individuals suspected of having heart failure. It can be used as an aid in the diagnosis of acute decompensated heart failure (ADHF) in patients presenting with signs and symptoms of ADHF to the emergency department (ED). In addition, NT-proBNP of <300 pg/mL indicates ADHF is not likely.    Age Range Result Interpretation  NT-proBNP Concentration (pg/mL:      <50             Positive            >450                   Gray                 300-450                    Negative             <300    50-75           Positive            >900                  Gray                300-900                  Negative            <300      >75             Positive            >1800                  Gray                300-1800                  Negative            <300    Magnesium [[id number]]  (Abnormal) Collected: 05/08/25 0644    Specimen: Blood Updated: 05/08/25 0713     Magnesium 1.5 mg/dL     CBC Auto Differential [[id number]]  (Abnormal) Collected: 05/08/25 0644    Specimen: Blood Updated: 05/08/25 0653     WBC 6.93 10*3/mm3      RBC 5.09 10*6/mm3      Hemoglobin 15.5 g/dL      Hematocrit 46.3 %      MCV 91.0 fL      MCH 30.5 pg      MCHC 33.5 g/dL      RDW 13.2 %      RDW-SD 43.5 fl      MPV 10.2 fL      Platelets 332 10*3/mm3      Neutrophil % 62.7 %      Lymphocyte % 17.7 %      Monocyte % 14.1 %      Eosinophil % 3.8 %      Basophil % 1.0 %      Immature Grans % 0.7 %      Neutrophils, Absolute  4.34 10*3/mm3      Lymphocytes, Absolute 1.23 10*3/mm3      Monocytes, Absolute 0.98 10*3/mm3      Eosinophils, Absolute 0.26 10*3/mm3      Basophils, Absolute 0.07 10*3/mm3      Immature Grans, Absolute 0.05 10*3/mm3      nRBC 0.0 /100 WBC     High Sensitivity Troponin T 1Hr [[id number]]  (Abnormal) Collected: 05/08/25 0810    Specimen: Blood Updated: 05/08/25 0837     HS Troponin T 25 ng/L      Troponin T Numeric Delta -1 ng/L      Troponin T % Delta -4    Narrative:      High Sensitive Troponin T Reference Range:  <14.0 ng/L- Negative Female for AMI  <22.0 ng/L- Negative Male for AMI  >=14 - Abnormal Female indicating possible myocardial injury.  >=22 - Abnormal Male indicating possible myocardial injury.   Clinicians would have to utilize clinical acumen, EKG, Troponin, and serial changes to determine if it is an Acute Myocardial Infarction or myocardial injury due to an underlying chronic condition.                  Imaging:    XR Chest 1 View  Result Date: 5/8/2025  XR CHEST 1 VW Date of exam: 5/8/2025 6:35 AM EDT. Comparison: 5/5/2025. Indication: Chest pain. FINDINGS: A single frontal (AP or PA upright portable) chest radiograph reveals no cardiac enlargement and no dense focal lobar infiltrate. No pneumothorax is seen. No pleural effusion. There is slight pulmonary hypoinflation. External artifacts are noted. Degenerative changes involve the imaged spine.     Probably no significant acute cardiopulmonary disease is seen radiographically.    Portions of this note were completed with a voice recognition program. Electronically Signed: Jani Steiner MD  5/8/2025 6:55 AM EDT  Workstation ID: [id number]        Differential Diagnosis and Discussion:    Chest Pain:  Based on the patient's signs and symptoms, I considered aortic dissection, myocardial infaction, pulmonary embolism, cardiac tamponade, pericarditis, pneumothorax, musculoskeletal chest pain and other differential diagnosis as an etiology of the  patient's chest pain.     PROCEDURES:    Labs were collected in the emergency department and all labs were reviewed and interpreted by me.  X-ray were performed in the emergency department and all X-ray impressions were independently interpreted by me.  An EKG was performed and the EKG was interpreted by me.    ECG 12 Lead ED Triage Standing Order; Chest Pain   Preliminary Result   HEART RATE=67  bpm   RR Drbzxxee=852  ms   HI Cmtmmyut=050  ms   P Horizontal Axis=18  deg   P Front Axis=45  deg   QRSD Interval=98  ms   QT Ammocqja=716  ms   NXvR=504  ms   QRS Axis=5  deg   T Wave Axis=32  deg   - OTHERWISE NORMAL ECG -   Sinus rhythm   Low voltage, precordial leads   Date and Time of Study:2025-05-08 06:38:37          Procedures    MDM     Amount and/or Complexity of Data Reviewed  Decide to obtain previous medical records or to obtain history from someone other than the patient: yes         Patient is a 54-year-old gentleman with multiple medical problems who presents with complaints of chest pain.  States that he had pressure in the middle of his chest with associated shortness of breath this morning.  Troponins here are flat but very concerning story since the pain was also relieved with nitro.  He does appear to have little excess fluid in the periphery.  Was given Lasix as well.  Nitro did relieve his pain and he is currently pain-free.  I did speak with cardiology about the patient.  Will admit to the hospital for further workup management since he has a concerning story and multiple risk factors.              Patient Care Considerations:          Consultants/Shared Management Plan:    Hospitalist: I have discussed the case with Dr. Molina who agrees to accept the patient for admission.  Consultant: I have discussed the case with Jaswinder who agrees to consult on the patient.    Social Determinants of Health:    Patient is independent, reliable, and has access to care.       Disposition and Care  Coordination:    Admit:   Through independent evaluation of the patient's history, physical, and imperical data, the patient meets criteria for inpatient admission to the hospital.        Final diagnoses:   Acute chest pain        ED Disposition       ED Disposition   Decision to Admit    Condition   --    Comment   --               This medical record created using voice recognition software.             Curly Lawrence MD  05/08/25 0849

## 2025-05-08 NOTE — H&P
[facility]   HOSPITALIST HISTORY AND PHYSICAL  Date: 2025   Patient Name: Kevin Bradshaw  : 1970  MRN: [id number]  Primary Care Physician:  Jennifer Hobbs APRN  Date of admission: 2025    Subjective   Subjective     Chief Complaint: 6/10 mid chest tightness lasting for few hours    HPI:    Kevin Bradshaw is a 54 y.o. male with past medical history of PE on Eliquis, obesity, LONNY on CPAP, hypertension, anxiety disorder and gout.  Patient woke up around 5 AM in the morning to use the bathroom and felt chest tightness in mid chest area lasting for few hours until he came to [facility] ED and was relieved by sublingual nitroglycerin.  It was 6/10 in intensity and nonradiating.  No new shortness of air, sweating, nausea, palpitations or dizziness with it.  Patient has gained about 40 pounds weight in past 3 months.  Has noticed increasing swelling in legs for a week or so.  Has chronic dyspnea on exertion where he has to take a break after walking 15 steps and is gradually getting worse.  Denies any PND.  Patient routinely sleeps in recliner for many years.  Patient was recently here in the hospital few weeks ago for hypoxia with diastolic dysfunction on 2D echo on .  Patient is compliant with his Eliquis.  Workup in the ED showed stable vital signs good saturation on room air.  Troponin is not significant.  BNP is negative.  Creatinine is 1.3.  CBC is negative.  Chest x-ray no acute finding.  EKG sinus rhythm no acute finding.  Because of his typical symptoms hospitalist has been called to admit him for observation.  Cardiology was consulted by ED physician.  During my evaluation patient denies any chest pain and feels back to himself      Personal History     Past Medical History:  Past Medical History:   Diagnosis Date    Allergies     Anxiety 2021    Degeneration of lumbar intervertebral disc 2016    Fatigue 2023    GERD (gastroesophageal reflux disease)      Gout     High blood pressure     Hypertension     Impaired fasting glucose 03/15/2022    Male hypogonadism 07/28/2021    Mid back pain 02/24/2016    Obstructive sleep apnea 07/28/2021    Polycythemia, secondary 03/15/2022    Related to ETOH abuse     Pulmonary embolism     Tendinitis of elbow 07/16/2021    Thoracic back pain     Vitamin D deficiency 07/28/2021       Past Surgical History:  Past Surgical History:   Procedure Laterality Date    CHOLECYSTECTOMY      GALLBLADDER SURGERY      HEMORRHOIDECTOMY      LUMBAR EPIDURAL INJECTION  2015    TONSILLECTOMY         Family History:   family history includes Hypertension in his father and mother.    Social History:    reports that he has never smoked. He has never been exposed to tobacco smoke. His smokeless tobacco use includes chew. He reports current alcohol use of about 28.0 standard drinks of alcohol per week. He reports that he does not use drugs.    Home Medications:  Diclofenac Sodium, Semaglutide-Weight Management, allopurinol, apixaban, atenolol, azithromycin, cyclobenzaprine, fluticasone, furosemide, lisinopril, and pantoprazole    Allergies:  Allergies   Allergen Reactions    Capsicum Annuum Extract & Derivative (Bell Pepper) [Capsicum] GI Intolerance    Onion GI Intolerance    Other GI Intolerance     Onions, peppers       Review of Systems   All systems were reviewed and negative except for: H&P    Objective   Objective     Vitals:   Temp:  [98 °F (36.7 °C)] 98 °F (36.7 °C)  Heart Rate:  [51-71] 51  Resp:  [19-24] 20  BP: (118-150)/(55-75) 136/55    Physical Exam    Constitutional: Awake, alert, no acute distress   Eyes: Pupils equal, sclerae anicteric, no conjunctival injection   HENT: NCAT, mucous membranes moist   Neck: Supple, no thyromegaly, no lymphadenopathy, trachea midline   Respiratory: Clear to auscultation bilaterally, nonlabored respirations    Cardiovascular: RRR, no murmurs, rubs, or gallops, palpable pedal pulses  bilaterally   Gastrointestinal: Positive bowel sounds, soft, nontender, nondistended   Musculoskeletal: +2 bilateral ankle edema, no clubbing or cyanosis to extremities   Psychiatric: Appropriate affect, cooperative   Neurologic: Oriented x 3, strength symmetric in all extremities, Cranial Nerves grossly intact to confrontation, speech clear   Skin: No rashes     Result Review    Result Review:  I have personally reviewed the results from the time of this admission to 5/8/2025 15:33 EDT and agree with these findings:  [x]  Laboratory  []  Microbiology  [x]  Radiology  [x]  EKG/Telemetry sinus rhythm 67, QT 0.43  []  Cardiology/Vascular   []  Pathology  [x]  Old records  [x]  Other: Medications      Assessment & Plan   Assessment / Plan     Assessment:  Chest tightness.  Rule out ACS like unstable angina.  Morbid obesity BMI 55.7.  Obstructive sleep apnea Home CPAP  Acute on chronic diastolic CHF.  BNP not reliable as patient obese.  Weight gain, swelling in legs and dyspnea on exertion due to above  Renal insufficiency.  Stable.  Hypomagnesemia.  History of gout.  History of PE on Eliquis.  Hypertension.      Plan:   Supplemental oxygen keep sats more than 90%  Low-salt diet, fluid restriction, strict input output and daily weights.  IV Lasix.  As needed nitroglycerin.  Repeat 2D echo.  Discussed with cardiology to evaluate patient.  Hold home Eliquis.  Will use therapeutic Lovenox.  Clear liquid diet.  Replace electrolytes.  Okay to use home CPAP.  Monitor kidney function.  Telemetry.  Discussed with the ED physician         VTE Prophylaxis:  Mechanical VTE prophylaxis orders are present.  Therapeutic Lovenox         CODE STATUS:    Code Status (Patient has no pulse and is not breathing): CPR (Attempt to Resuscitate)  Medical Interventions (Patient has pulse or is breathing): Full Support      Admission Status:  I believe this patient meets observation status.    Part of this note may be an electronic  transcription/translation of spoken language to printed text using the Dragon Dictation System.     Electronically signed by Anthony Molina MD, 05/08/25, 3:33 PM EDT.

## 2025-05-08 NOTE — ED TRIAGE NOTES
"Called patient for EKG and triage, patient stated he needed to have a BM, patient states \"It's gonna be a minute\" when checked on by RN.  "

## 2025-05-09 ENCOUNTER — APPOINTMENT (OUTPATIENT)
Dept: NUCLEAR MEDICINE | Facility: HOSPITAL | Age: 55
End: 2025-05-09
Payer: COMMERCIAL

## 2025-05-09 LAB
ANION GAP SERPL CALCULATED.3IONS-SCNC: 11.9 MMOL/L (ref 5–15)
AORTIC DIMENSIONLESS INDEX: 0.54 (DI)
AV MEAN PRESS GRAD SYS DOP V1V2: 3.5 MMHG
AV VMAX SYS DOP: 118.8 CM/SEC
BACTERIA SPEC AEROBE CULT: NO GROWTH
BASOPHILS # BLD AUTO: 0.07 10*3/MM3 (ref 0–0.2)
BASOPHILS NFR BLD AUTO: 0.9 % (ref 0–1.5)
BH CV ECHO MEAS - AO MAX PG: 5.6 MMHG
BH CV ECHO MEAS - AO ROOT DIAM: 3.1 CM
BH CV ECHO MEAS - AO V2 VTI: 30.9 CM
BH CV ECHO MEAS - AVA(I,D): 1.69 CM2
BH CV ECHO MEAS - EDV(MOD-SP2): 162.6 ML
BH CV ECHO MEAS - EDV(MOD-SP4): 174.3 ML
BH CV ECHO MEAS - EF(MOD-SP2): 71 %
BH CV ECHO MEAS - EF(MOD-SP4): 56.5 %
BH CV ECHO MEAS - ESV(MOD-SP2): 47.1 ML
BH CV ECHO MEAS - ESV(MOD-SP4): 75.8 ML
BH CV ECHO MEAS - IVS/LVPW: 0.89 CM
BH CV ECHO MEAS - IVSD: 0.8 CM
BH CV ECHO MEAS - LA DIMENSION: 4.4 CM
BH CV ECHO MEAS - LAT PEAK E' VEL: 12.2 CM/SEC
BH CV ECHO MEAS - LV DIASTOLIC VOL/BSA (35-75): 67.1 CM2
BH CV ECHO MEAS - LV MAX PG: 2.6 MMHG
BH CV ECHO MEAS - LV MEAN PG: 1.1 MMHG
BH CV ECHO MEAS - LV SYSTOLIC VOL/BSA (12-30): 29.2 CM2
BH CV ECHO MEAS - LV V1 MAX: 80 CM/SEC
BH CV ECHO MEAS - LV V1 VTI: 16.6 CM
BH CV ECHO MEAS - LVIDD: 5.1 CM
BH CV ECHO MEAS - LVIDS: 2.9 CM
BH CV ECHO MEAS - LVOT AREA: 3.1 CM2
BH CV ECHO MEAS - LVOT DIAM: 2 CM
BH CV ECHO MEAS - LVPWD: 0.9 CM
BH CV ECHO MEAS - MED PEAK E' VEL: 13.9 CM/SEC
BH CV ECHO MEAS - MV A MAX VEL: 73.1 CM/SEC
BH CV ECHO MEAS - MV DEC SLOPE: 519 CM/SEC2
BH CV ECHO MEAS - MV E MAX VEL: 120.7 CM/SEC
BH CV ECHO MEAS - MV E/A: 1.65
BH CV ECHO MEAS - MV MAX PG: 5.6 MMHG
BH CV ECHO MEAS - MV MEAN PG: 1.9 MMHG
BH CV ECHO MEAS - MV P1/2T: 68 MSEC
BH CV ECHO MEAS - MV V2 VTI: 35.5 CM
BH CV ECHO MEAS - MVA(P1/2T): 3.3 CM2
BH CV ECHO MEAS - MVA(VTI): 1.47 CM2
BH CV ECHO MEAS - RVDD: 2.41 CM
BH CV ECHO MEAS - SV(LVOT): 52.2 ML
BH CV ECHO MEAS - SV(MOD-SP2): 115.5 ML
BH CV ECHO MEAS - SV(MOD-SP4): 98.5 ML
BH CV ECHO MEAS - SVI(LVOT): 20.1 ML/M2
BH CV ECHO MEAS - SVI(MOD-SP2): 44.5 ML/M2
BH CV ECHO MEAS - SVI(MOD-SP4): 37.9 ML/M2
BH CV ECHO MEASUREMENTS AVERAGE E/E' RATIO: 9.25
BH CV IMMEDIATE POST TECH DATA BLOOD PRESSURE: NORMAL MMHG
BH CV IMMEDIATE POST TECH DATA HEART RATE: 74 BPM
BH CV IMMEDIATE POST TECH DATA OXYGEN SATS: 97 %
BH CV REST NUCLEAR ISOTOPE DOSE: 10.2 MCI
BH CV SIX MINUTE RECOVERY TECH DATA BLOOD PRESSURE: NORMAL
BH CV SIX MINUTE RECOVERY TECH DATA HEART RATE: 65 BPM
BH CV SIX MINUTE RECOVERY TECH DATA OXYGEN SATURATION: 96 %
BH CV STRESS BP STAGE 1: NORMAL
BH CV STRESS COMMENTS STAGE 1: NORMAL
BH CV STRESS DOSE REGADENOSON STAGE 1: 0.4
BH CV STRESS DURATION MIN STAGE 1: 0
BH CV STRESS DURATION SEC STAGE 1: 10
BH CV STRESS HR STAGE 1: 59
BH CV STRESS NUCLEAR ISOTOPE DOSE: 34.6 MCI
BH CV STRESS O2 STAGE 1: 97
BH CV STRESS PROTOCOL 1: NORMAL
BH CV STRESS RECOVERY BP: NORMAL MMHG
BH CV STRESS RECOVERY HR: 65 BPM
BH CV STRESS RECOVERY O2: 96 %
BH CV STRESS STAGE 1: 1
BH CV THREE MINUTE POST TECH DATA BLOOD PRESSURE: NORMAL MMHG
BH CV THREE MINUTE POST TECH DATA HEART RATE: 67 BPM
BH CV THREE MINUTE POST TECH DATA OXYGEN SATURATION: 98 %
BUN SERPL-MCNC: 32 MG/DL (ref 6–20)
BUN/CREAT SERPL: 20 (ref 7–25)
CALCIUM SPEC-SCNC: 9 MG/DL (ref 8.6–10.5)
CHLORIDE SERPL-SCNC: 101 MMOL/L (ref 98–107)
CO2 SERPL-SCNC: 23.1 MMOL/L (ref 22–29)
CREAT SERPL-MCNC: 1.6 MG/DL (ref 0.76–1.27)
DEPRECATED RDW RBC AUTO: 44.2 FL (ref 37–54)
EGFRCR SERPLBLD CKD-EPI 2021: 51.2 ML/MIN/1.73
EOSINOPHIL # BLD AUTO: 0.31 10*3/MM3 (ref 0–0.4)
EOSINOPHIL NFR BLD AUTO: 3.8 % (ref 0.3–6.2)
ERYTHROCYTE [DISTWIDTH] IN BLOOD BY AUTOMATED COUNT: 13 % (ref 12.3–15.4)
GLUCOSE SERPL-MCNC: 105 MG/DL (ref 65–99)
HCT VFR BLD AUTO: 44.1 % (ref 37.5–51)
HGB BLD-MCNC: 14.3 G/DL (ref 13–17.7)
IMM GRANULOCYTES # BLD AUTO: 0.05 10*3/MM3 (ref 0–0.05)
IMM GRANULOCYTES NFR BLD AUTO: 0.6 % (ref 0–0.5)
LYMPHOCYTES # BLD AUTO: 1.37 10*3/MM3 (ref 0.7–3.1)
LYMPHOCYTES NFR BLD AUTO: 16.7 % (ref 19.6–45.3)
MAGNESIUM SERPL-MCNC: 1.7 MG/DL (ref 1.6–2.6)
MAXIMAL PREDICTED HEART RATE: 167 BPM
MCH RBC QN AUTO: 29.9 PG (ref 26.6–33)
MCHC RBC AUTO-ENTMCNC: 32.4 G/DL (ref 31.5–35.7)
MCV RBC AUTO: 92.3 FL (ref 79–97)
MONOCYTES # BLD AUTO: 1.43 10*3/MM3 (ref 0.1–0.9)
MONOCYTES NFR BLD AUTO: 17.4 % (ref 5–12)
NEUTROPHILS NFR BLD AUTO: 4.98 10*3/MM3 (ref 1.7–7)
NEUTROPHILS NFR BLD AUTO: 60.6 % (ref 42.7–76)
NRBC BLD AUTO-RTO: 0 /100 WBC (ref 0–0.2)
PERCENT MAX PREDICTED HR: 44.91 %
PLATELET # BLD AUTO: 297 10*3/MM3 (ref 140–450)
PMV BLD AUTO: 10.3 FL (ref 6–12)
POTASSIUM SERPL-SCNC: 4 MMOL/L (ref 3.5–5.2)
RBC # BLD AUTO: 4.78 10*6/MM3 (ref 4.14–5.8)
SODIUM SERPL-SCNC: 136 MMOL/L (ref 136–145)
SPECT HRT GATED+EF W RNC IV: 56 %
STRESS BASELINE BP: NORMAL MMHG
STRESS BASELINE HR: 57 BPM
STRESS O2 SAT REST: 96 %
STRESS PERCENT HR: 53 %
STRESS POST O2 SAT PEAK: 97 %
STRESS POST PEAK BP: NORMAL MMHG
STRESS POST PEAK HR: 75 BPM
STRESS TARGET HR: 142 BPM
WBC NRBC COR # BLD AUTO: 8.21 10*3/MM3 (ref 3.4–10.8)

## 2025-05-09 PROCEDURE — 34310000005 TECHNETIUM TETROFOSMIN KIT: Performed by: INTERNAL MEDICINE

## 2025-05-09 PROCEDURE — 93017 CV STRESS TEST TRACING ONLY: CPT

## 2025-05-09 PROCEDURE — 99214 OFFICE O/P EST MOD 30 MIN: CPT | Performed by: INTERNAL MEDICINE

## 2025-05-09 PROCEDURE — 80048 BASIC METABOLIC PNL TOTAL CA: CPT | Performed by: INTERNAL MEDICINE

## 2025-05-09 PROCEDURE — 96372 THER/PROPH/DIAG INJ SC/IM: CPT

## 2025-05-09 PROCEDURE — G0378 HOSPITAL OBSERVATION PER HR: HCPCS

## 2025-05-09 PROCEDURE — 83735 ASSAY OF MAGNESIUM: CPT | Performed by: INTERNAL MEDICINE

## 2025-05-09 PROCEDURE — 94799 UNLISTED PULMONARY SVC/PX: CPT

## 2025-05-09 PROCEDURE — 78452 HT MUSCLE IMAGE SPECT MULT: CPT

## 2025-05-09 PROCEDURE — A9502 TC99M TETROFOSMIN: HCPCS | Performed by: INTERNAL MEDICINE

## 2025-05-09 PROCEDURE — 99233 SBSQ HOSP IP/OBS HIGH 50: CPT | Performed by: INTERNAL MEDICINE

## 2025-05-09 PROCEDURE — 94660 CPAP INITIATION&MGMT: CPT

## 2025-05-09 PROCEDURE — 78452 HT MUSCLE IMAGE SPECT MULT: CPT | Performed by: INTERNAL MEDICINE

## 2025-05-09 PROCEDURE — 93016 CV STRESS TEST SUPVJ ONLY: CPT | Performed by: NURSE PRACTITIONER

## 2025-05-09 PROCEDURE — 25010000002 ENOXAPARIN PER 10 MG: Performed by: INTERNAL MEDICINE

## 2025-05-09 PROCEDURE — 93018 CV STRESS TEST I&R ONLY: CPT | Performed by: INTERNAL MEDICINE

## 2025-05-09 PROCEDURE — 25010000002 REGADENOSON 0.4 MG/5ML SOLUTION: Performed by: INTERNAL MEDICINE

## 2025-05-09 PROCEDURE — 97161 PT EVAL LOW COMPLEX 20 MIN: CPT

## 2025-05-09 PROCEDURE — 85025 COMPLETE CBC W/AUTO DIFF WBC: CPT | Performed by: INTERNAL MEDICINE

## 2025-05-09 RX ORDER — REGADENOSON 0.08 MG/ML
0.4 INJECTION, SOLUTION INTRAVENOUS
Status: COMPLETED | OUTPATIENT
Start: 2025-05-09 | End: 2025-05-09

## 2025-05-09 RX ORDER — ISOSORBIDE MONONITRATE 30 MG/1
30 TABLET, EXTENDED RELEASE ORAL
Status: DISCONTINUED | OUTPATIENT
Start: 2025-05-09 | End: 2025-05-10 | Stop reason: HOSPADM

## 2025-05-09 RX ORDER — AMLODIPINE BESYLATE 5 MG/1
5 TABLET ORAL DAILY
Status: DISCONTINUED | OUTPATIENT
Start: 2025-05-09 | End: 2025-05-10 | Stop reason: HOSPADM

## 2025-05-09 RX ORDER — ASPIRIN 81 MG/1
81 TABLET ORAL DAILY
Status: DISCONTINUED | OUTPATIENT
Start: 2025-05-09 | End: 2025-05-09

## 2025-05-09 RX ORDER — HYDROXYZINE HYDROCHLORIDE 25 MG/1
25 TABLET, FILM COATED ORAL 4 TIMES DAILY PRN
Status: DISCONTINUED | OUTPATIENT
Start: 2025-05-09 | End: 2025-05-10 | Stop reason: HOSPADM

## 2025-05-09 RX ADMIN — ATENOLOL 25 MG: 25 TABLET ORAL at 14:33

## 2025-05-09 RX ADMIN — ALLOPURINOL 100 MG: 100 TABLET ORAL at 14:33

## 2025-05-09 RX ADMIN — Medication 10 ML: at 14:34

## 2025-05-09 RX ADMIN — REGADENOSON 0.4 MG: 0.08 INJECTION, SOLUTION INTRAVENOUS at 10:52

## 2025-05-09 RX ADMIN — ATORVASTATIN CALCIUM 20 MG: 20 TABLET, FILM COATED ORAL at 21:01

## 2025-05-09 RX ADMIN — PANTOPRAZOLE SODIUM 40 MG: 40 TABLET, DELAYED RELEASE ORAL at 14:33

## 2025-05-09 RX ADMIN — ENOXAPARIN SODIUM 120 MG: 100 INJECTION SUBCUTANEOUS at 17:09

## 2025-05-09 RX ADMIN — NICOTINE 1 PATCH: 21 PATCH, EXTENDED RELEASE TRANSDERMAL at 23:38

## 2025-05-09 RX ADMIN — ISOSORBIDE MONONITRATE 30 MG: 30 TABLET, EXTENDED RELEASE ORAL at 18:04

## 2025-05-09 RX ADMIN — HYDROXYZINE HYDROCHLORIDE 25 MG: 25 TABLET, FILM COATED ORAL at 19:31

## 2025-05-09 RX ADMIN — AMLODIPINE BESYLATE 5 MG: 5 TABLET ORAL at 18:04

## 2025-05-09 RX ADMIN — NICOTINE 1 PATCH: 21 PATCH, EXTENDED RELEASE TRANSDERMAL at 00:03

## 2025-05-09 RX ADMIN — TETROFOSMIN 1 DOSE: 1.38 INJECTION, POWDER, LYOPHILIZED, FOR SOLUTION INTRAVENOUS at 10:52

## 2025-05-09 RX ADMIN — TETROFOSMIN 1 DOSE: 1.38 INJECTION, POWDER, LYOPHILIZED, FOR SOLUTION INTRAVENOUS at 09:00

## 2025-05-09 RX ADMIN — ENOXAPARIN SODIUM 120 MG: 100 INJECTION SUBCUTANEOUS at 04:21

## 2025-05-09 RX ADMIN — Medication 10 ML: at 21:01

## 2025-05-09 RX ADMIN — ATENOLOL 25 MG: 25 TABLET ORAL at 21:01

## 2025-05-09 NOTE — PROGRESS NOTES
INTERVENTIONAL CARDIOLOGY  INPATIENT PROGRESS NOTE        PATIENT IDENTIFICATION:  Name:  Kevin Bradshaw        MRN:  [id number]  54 y.o.  male            Chief Complain:   Chest pain    SUBJECTIVE:   Patient underwent stress test today.  No acute events overnight.    OBJECTIVE:  Vitals:    05/09/25 0342 05/09/25 0550 05/09/25 0750 05/09/25 1426   BP:   147/82 150/78   BP Location:   Left arm Left arm   Patient Position:   Sitting Sitting   Pulse: 51  53    Resp: 18 18 18 18   Temp:   97.9 °F (36.6 °C) 98.4 °F (36.9 °C)   TempSrc:  Oral Oral Oral   SpO2: 97%  98% 96%   Weight:  (!) 163 kg (359 lb 9.1 oz)     Height:               Body mass index is 54.67 kg/m².    Intake/Output Summary (Last 24 hours) at 5/9/2025 1723  Last data filed at 5/9/2025 1326  Gross per 24 hour   Intake 410 ml   Output --   Net 410 ml         Physical Exam  General : Alert, awake, no acute distress, morbidly obese  Neck : No jugular venous distention  CVS : Regular rate and rhythm, no murmur  Lungs: Clear to auscultation bilaterally, no crackles or rhonchi  Abdomen: Soft, nontender  Extremities: Warm, well-perfused, no pretibial edema        Allergies   Allergen Reactions    Capsicum Annuum Extract & Derivative (Bell Pepper) [Capsicum] GI Intolerance    Onion GI Intolerance    Other GI Intolerance     Onions, peppers     Scheduled meds:  allopurinol, 100 mg, Oral, Daily  atenolol, 25 mg, Oral, BID  atorvastatin, 20 mg, Oral, Nightly  enoxaparin sodium, 0.7 mg/kg, Subcutaneous, Q12H  isosorbide mononitrate, 30 mg, Oral, Q24H  nicotine, 1 patch, Transdermal, Q24H  pantoprazole, 40 mg, Oral, Daily  sodium chloride, 10 mL, Intravenous, Q12H      IV meds:                           Data Review:  CBC          5/5/2025    07:21 5/8/2025    06:44 5/9/2025    05:18   CBC   WBC 7.31  6.93  8.21    RBC 4.92  5.09  4.78    Hemoglobin 14.8  15.5  14.3    Hematocrit 45.2  46.3  44.1    MCV 91.9  91.0  92.3    MCH 30.1  30.5  29.9    MCHC 32.7  33.5  32.4   "  RDW 13.2  13.2  13.0    Platelets 284  332  297      CMP          5/5/2025    07:21 5/8/2025    06:44 5/9/2025    05:18   CMP   Glucose 119  113  105    BUN 28  24  32    Creatinine 1.33  1.35  1.60    EGFR 63.9  62.8  51.2    Sodium 137  134  136    Potassium 4.9  4.3  4.0    Chloride 104  100  101    Calcium 8.4  8.8  9.0    Total Protein 6.4  6.8     Albumin 3.3  3.6     Globulin 3.1  3.2     Total Bilirubin 0.4  0.4     Alkaline Phosphatase 48  54     AST (SGOT) 27  26     ALT (SGPT) 19  17     Albumin/Globulin Ratio 1.1  1.1     BUN/Creatinine Ratio 21.1  17.8  20.0    Anion Gap 13.1  13.3  11.9       CARDIAC LABS:      Lab 05/08/25  0810 05/08/25  0644 05/05/25  0848 05/05/25  0721   PROBNP  --  772.7  --  615.0   HSTROP T 25* 26* 22* 21        No results found for: \"DIGOXIN\"   Lab Results   Component Value Date    TSH 5.460 (H) 04/28/2025     Results from last 7 days   Lab Units 05/08/25  0810   CHOLESTEROL mg/dL 128   HDL CHOL mg/dL 36*     Lab Results   Component Value Date    POCTROP 0.00 02/15/2021     Lab Results   Component Value Date    TROPONINT 25 (H) 05/08/2025   (  Lab Results   Component Value Date    MG 1.7 05/09/2025     Results for orders placed during the hospital encounter of 05/08/25    Adult Transthoracic Echo Complete w/ Color, Spectral and Contrast if necessary per protocol    Interpretation Summary    Left ventricular systolic function is normal. Left ventricular ejection fraction appears to be 56 - 60%.    Left ventricular diastolic function was indeterminate.    There is calcification of the aortic valve without significant stenosis.    Compared to echo from 4/28/2025, Left atrial size appears to be normal.           ASSESSMENT:    Chest pain    Essential hypertension    Other chest pain        PLAN:  - Stress test showed no significant perfusion defect.  Echocardiogram showed normal ejection fraction with normal chamber size.  Overall a low risk study for ischemic cardiac " disease.  - Aggressive risk factor modifications.  Will start on aspirin 81 mg daily.   - Troponin elevation likely type II MI secondary to hypertension versus mild CAD.  Will start on Imdur 30 mg daily.  - Will start on amlodipine 5 mg daily. If serum creatinine stable, may start on diuretics such chlorthalidone 25 mg daily for CHFpEF, likely as outpatient. Monitor Scr.   - Patient stable for discharge from cardiac standpoint, may follow-up with cardiology clinic in 4 to 6 weeks postdischarge.    Thank you for the consult.  Cardiology will be available as needed.    Neisha San MD, [facility]  5/9/2025    I spent 30 minutes caring for this patient on this date of service. This time includes time spent by me in the following activities:preparing for the visit, reviewing tests, obtaining and/or reviewing a separately obtained history, performing a medically appropriate examination and/or evaluation , counseling and educating the patient/family/caregiver, ordering medications, tests, or procedures, referring and communicating with other health care professionals , documenting information in the medical record, independently interpreting results and communicating that information with the patient/family/caregiver, and care coordination. The patient was seen and examined. Work by the provider also included review and/or ordering of lab tests, review and/or ordering of radiology tests, review and/or ordering of medicine tests, discussion with other physicians or providers, independent review of data, obtaining old records, review/summation of old records, and/or other review.

## 2025-05-09 NOTE — PROGRESS NOTES
[facility]   Hospitalist Progress Note  Date: 2025  Patient Name: Kevin Bradshaw  : 1970  MRN: [id number]  Date of admission: 2025      Subjective   Subjective     Chief Complaint: 6/10 mid chest tightness lasting for few hours     Summary:   Kevin Bradshaw is a 54 y.o. male with past medical history of PE on Eliquis, obesity, LONNY on CPAP, hypertension, anxiety disorder and gout.  Patient woke up around 5 AM in the morning to use the bathroom and felt chest tightness in mid chest area lasting for few hours until he came to [facility] ED and was relieved by sublingual nitroglycerin.  It was 6/10 in intensity and nonradiating.  No new shortness of air, sweating, nausea, palpitations or dizziness with it.  Patient has gained about 40 pounds weight in past 3 months.  Has noticed increasing swelling in legs for a week or so.  Has chronic dyspnea on exertion where he has to take a break after walking 15 steps and is gradually getting worse.  Denies any PND.  Patient routinely sleeps in recliner for many years.  Patient was recently here in the hospital few weeks ago for hypoxia with diastolic dysfunction on 2D echo on .  Patient is compliant with his Eliquis.  Workup in the ED showed stable vital signs good saturation on room air.  Troponin is not significant.  BNP is negative.  Creatinine is 1.3.  CBC is negative.  Chest x-ray no acute finding.  EKG sinus rhythm no acute finding.  Because of his typical symptoms hospitalist has been called to admit him for observation.  Cardiology was consulted by ED physician.  During my evaluation patient denies any chest pain and feels back to himself  Patient seen by cardiology and had stress test done May 9     Interval Followup:   Vitals are stable on room air.  Has been using home CPAP.  Denies any chest pain.  Shortness of air better.  Swelling lower extremity improving.  Patient denies going through withdrawal.  Patient drinks 6 cans of  beer and 2 shots of bourbon daily.  Last drink was on May 6.  Review of Systems   All systems were reviewed and negative except for: Summary and interval follow-up    Objective   Objective     Vitals:   Temp:  [97.5 °F (36.4 °C)-98.4 °F (36.9 °C)] 98.4 °F (36.9 °C)  Heart Rate:  [51-56] 53  Resp:  [18] 18  BP: (139-150)/(69-82) 150/78  Physical Exam      Constitutional: Awake, alert, no acute distress              Eyes: Pupils equal, sclerae anicteric, no conjunctival injection              HENT: NCAT, mucous membranes moist              Neck: Supple, no thyromegaly, no lymphadenopathy, trachea midline              Respiratory: Clear to auscultation bilaterally, nonlabored respirations               Cardiovascular: RRR, no murmurs, rubs, or gallops, palpable pedal pulses bilaterally              Gastrointestinal: Positive bowel sounds, soft, nontender, nondistended              Musculoskeletal: +2 bilateral ankle edema, no clubbing or cyanosis to extremities              Psychiatric: Appropriate affect, cooperative              Neurologic: Oriented x 3, strength symmetric in all extremities, Cranial Nerves grossly intact to confrontation, speech clear              Skin: No rashes       Result Review    Result Review:  I have personally reviewed the results for the past 24 hours and agree with these findings:  [x]  Laboratory  []  Microbiology  [x]  Radiology  []  EKG/Telemetry   []  Cardiology/Vascular   []  Pathology  [x]  Old records  [x]  Other: Medications    Assessment & Plan   Assessment / Plan     Assessment:    Chest tightness.  Rule out ACS like unstable angina.  Morbid obesity BMI 55.7.  Obstructive sleep apnea on Home CPAP  Acute on chronic diastolic CHF.  BNP not reliable as patient obese.  Weight gain, swelling in legs and dyspnea on exertion due to above.  Acute kidney injury.  Likely from diuresis  Hypomagnesemia.  Supplemented  History of gout.  History of PE on Eliquis.  Hypertension.  Prediabetes.   Hemoglobin A1c of 6.2%  Pyuria and hematuria without bacteriuria.        Plan:   Supplemental oxygen keep sats more than 90%  Low-salt diet, fluid restriction, strict input output and daily weights.  DC IV Lasix.  As needed nitroglycerin.  Repeat 2D echo.  Pending  Discussed with cardiology to evaluate patient.  Appreciate input await results of stress test  Continue to hold home Eliquis.  Meanwhile use therapeutic Lovenox.  Pharmacy monitoring  anti-XA level  Heart healthy carb diet.  Replace electrolytes.  Okay to use home CPAP.  Monitor kidney function.  Continue telemetry.  No need of CIWA protocol as per patient he does not go through withdrawals but will watch closely    Discussed plan with RN and .  Discharge home when cleared by cardiology and kidney function has improved    VTE Prophylaxis:  Pharmacologic & mechanical VTE prophylaxis orders are present.        CODE STATUS:   Code Status (Patient has no pulse and is not breathing): CPR (Attempt to Resuscitate)  Medical Interventions (Patient has pulse or is breathing): Full Support      Part of this note may be an electronic transcription/translation of spoken language to printed text using the Dragon Dictation System.     Electronically signed by Anthony Molina MD, 05/09/25, 4:33 PM EDT.

## 2025-05-09 NOTE — NURSING NOTE
Patient Kevin Bradshaw admitted to [facility] from the ED. Patient is alert and oriented to person, place, time, and situation. Patient oriented to unit, call light system and bed alarm use, teaching effective. Patient despite education, refused and signed refusal form for bed alarm use. Vikki Auris screening revealed patient will NOT need tested, contact isolation and bleach cleaning due to no risk factors. Patient currently is not a concern for an active CDIFF infection.    4 eyes skin assessment completed with Ivanna Bucio RN. Per policy, the following skin interventions were put in place as a result of the skin assessment below: None - Clement is greater than 18 and no skin issues noted    Skin Assessments (most recent)      Flowsheet Row Most Recent Value   Skin WDL .WDL except, color   Skin Color/Characteristics dionisio   Sensory Perception 3-->slightly limited   Moisture 3-->occasionally moist   Activity 3-->walks occasionally   Mobility 3-->slightly limited   Nutrition 3-->adequate   Friction and Shear 3-->no apparent problem   Clement Score 18   Pressure Reduction Devices pressure-redistributing mattress utilized   Pressure Reduction Techniques weight shift assistance provided            Fall assessment completed. Per policy, the patient was determined be a Low fall risk due to Bello Kris score 14 or less (only used within the first 24 hours of admission). Patient assessed to need the following mobility assistance: Independent with the following assistive devices: None, and will be using the bathroom and a urinal for toileting. Per policy, the following fall interventions were put in place: Standard Fall Precautions - oriented to room and call light, bed low and locked, clutter free environment, adequate lighting, directed to call for assistance, non-skid footwear, hourly rounding and personal belongings within reach. and Fall Risk Care Plan.     Patient's belongings that were sent with family: None  Patient's  belongings that were sent to security: None  Patient's belongings locked in safe box in room: None  Patient's belongings that were sent to pharmacy: None  Patient's belongings kept at bedside per patient: Glasses and Other:Cell phone with , shirt, shorts, shoes

## 2025-05-09 NOTE — THERAPY EVALUATION
Acute Care - Physical Therapy Initial Evaluation   [facility]     Patient Name: Kevin Bradshaw  : 1970  MRN: [id number]  Today's Date: 2025      Visit Dx:     ICD-10-CM ICD-9-CM   1. Acute chest pain  R07.9 786.50   2. Difficulty walking  R26.2 719.7     Patient Active Problem List   Diagnosis    Tendinitis of elbow    Chronic gouty arthritis    Degeneration of lumbar intervertebral disc    Essential hypertension    Gastro-esophageal reflux disease without esophagitis    Male hypogonadism    Obstructive sleep apnea    Low back pain    Thoracic back pain    Vitamin D deficiency    History of pulmonary embolus (PE)    Anxiety    Impaired fasting glucose    Polycythemia, secondary    Nausea and vomiting    Other fatigue    Obesity, morbid, BMI 50 or higher    Acute pain of right knee    Actinic keratoses    Seasonal allergic rhinitis    Episode of memory loss    Low back strain, subsequent encounter    Folic acid deficiency    Shortness of breath    Hepatic steatosis    Hematuria    Diastolic CHF, chronic    Other chest pain    Chest pain     Past Medical History:   Diagnosis Date    Allergies     Anxiety 2021    Degeneration of lumbar intervertebral disc 2016    Fatigue 2023    GERD (gastroesophageal reflux disease)     Gout     High blood pressure     Hypertension     Impaired fasting glucose 03/15/2022    Male hypogonadism 2021    Mid back pain 2016    Obstructive sleep apnea 2021    Polycythemia, secondary 03/15/2022    Related to ETOH abuse     Pulmonary embolism     Tendinitis of elbow 2021    Thoracic back pain     Vitamin D deficiency 2021     Past Surgical History:   Procedure Laterality Date    CHOLECYSTECTOMY      GALLBLADDER SURGERY      HEMORRHOIDECTOMY      LUMBAR EPIDURAL INJECTION  2015    TONSILLECTOMY       PT Assessment (Last 12 Hours)       PT Evaluation and Treatment       Row Name 25 1420          Physical Therapy Time and Intention     Subjective Information no complaints (P)   -RA     Document Type evaluation (P)   -RA     Mode of Treatment individual therapy;physical therapy (P)   -RA     Patient Effort good (P)   -RA     Symptoms Noted During/After Treatment shortness of breath (P)   Pt reports as normal.  -RA       Row Name 05/09/25 1420          General Information    Patient Profile Reviewed yes (P)   -RA     Patient Observations alert;cooperative;agree to therapy (P)   -RA     Prior Level of Function independent:;all household mobility;community mobility;gait;transfer;bed mobility;ADL's (P)   -RA     Equipment Currently Used at Home none (P)   -RA       Row Name 05/09/25 1420          Living Environment    Current Living Arrangements home (P)   -RA     Home Accessibility stairs to enter home (P)   -RA     People in Home child(natividad), adult (P)   -RA     Primary Care Provided by self (P)   -RA       Row Name 05/09/25 1420          Home Main Entrance    Number of Stairs, Main Entrance two (P)   -RA       Row Name 05/09/25 1420          Home Use of Assistive/Adaptive Equipment    Equipment Currently Used at Home none (P)   -RA       [facility] Name 05/09/25 1420          Cognition    Orientation Status (Cognition) oriented x 3 (P)   -RA       Row Name 05/09/25 1420          Range of Motion (ROM)    Range of Motion ROM is WFL (P)   -RA       Row Name 05/09/25 1420          Strength (Manual Muscle Testing)    Strength (Manual Muscle Testing) strength is WFL (P)   -RA       Row Name 05/09/25 1420          Bed Mobility    Comment, (Bed Mobility) Pt seated EOB upon arrival (P)   -RA       [facility] Name 05/09/25 1420          Transfers    Transfers sit-stand transfer;stand-sit transfer (P)   -RA       Row Name 05/09/25 1420          Sit-Stand Transfer    Sit-Stand [place] (Transfers) standby assist (P)   -RA       Row Name 05/09/25 1420          Stand-Sit Transfer    Stand-Sit [place] (Transfers) standby assist (P)   -RA       Row Name 05/09/25 1420           Gait/Stairs (Locomotion)    Gait/Stairs Locomotion gait/ambulation independence (P)   -RA     [place] Level (Gait) standby assist (P)   -RA     Assistive Device (Gait) other (see comments) (P)   no AD  -RA     Patient was able to Ambulate yes (P)   -RA     Distance in Feet (Gait) 200 (P)   -RA       Row Name 05/09/25 1420          Balance    Balance Assessment standing dynamic balance (P)   -RA     Dynamic Standing Balance standby assist (P)   -RA     Position/Device Used, Standing Balance other (see comments) (P)   no AD  -RA       Row Name 05/09/25 1420          Plan of Care Review    Outcome Evaluation Pt presents with independence in transfers and ambulation. He does not have any safety concerns impacting his ability to return to baseline function and mobility. No inpatient skilled PT services are indicated at this time. (P)   -RA       Row Name 05/09/25 1420          Therapy Assessment/Plan (PT)    Criteria for Skilled Interventions Met (PT) no problems identified which require skilled intervention (P)   -RA     Therapy Frequency (PT) evaluation only (P)   -RA       Row Name 05/09/25 1420          PT Evaluation Complexity    History, PT Evaluation Complexity no personal factors and/or comorbidities (P)   -RA     Examination of Body Systems (PT Eval Complexity) total of 4 or more elements (P)   -RA     Clinical Presentation (PT Evaluation Complexity) stable (P)   -RA     Clinical Decision Making (PT Evaluation Complexity) low complexity (P)   -RA     Overall Complexity (PT Evaluation Complexity) low complexity (P)   -RA       Row Name 05/09/25 1420          Physical Therapy Goals    Problem Specific Goal Selection (PT) problem specific goal 1, PT (P)   -RA       Row Name 05/09/25 1420          Problem Specific Goal 1 (PT)    Problem Specific Goal 1 (PT) Complete PT evaluation (P)   -RA     Time Frame (Problem Specific Goal 1, PT) 1 day (P)   -RA     Progress/Outcome (Problem Specific Goal 1, PT)  goal met (P)   -RA               User Key  (r) = Recorded By, (t) = Taken By, (c) = Cosigned By      Initials Name Provider Type    Eleni Sims, PT Student PT Student                      PT Recommendation and Plan  Anticipated Discharge Disposition (PT): (P) home with assist  Therapy Frequency (PT): (P) evaluation only  Outcome Evaluation: (P) Pt presents with independence in transfers and ambulation. He does not have any safety concerns impacting his ability to return to baseline function and mobility. No inpatient skilled PT services are indicated at this time.   Outcome Measures       Row Name 05/09/25 1400             How much help from another person do you currently need...    Turning from your back to your side while in flat bed without using bedrails? 4 (P)   -RA      Moving from lying on back to sitting on the side of a flat bed without bedrails? 4 (P)   -RA      Moving to and from a bed to a chair (including a wheelchair)? 4 (P)   -RA      Standing up from a chair using your arms (e.g., wheelchair, bedside chair)? 4 (P)   -RA      Climbing 3-5 steps with a railing? 4 (P)   -RA      To walk in hospital room? 4 (P)   -RA      AM-PAC 6 Clicks Score (PT) 24 (P)   -RA                User Key  (r) = Recorded By, (t) = Taken By, (c) = Cosigned By      Initials Name Provider Type    Eleni Sims, PT Student PT Student                     Time Calculation:    PT Charges       Row Name 05/09/25 1425             Time Calculation    PT Received On 05/09/25 (P)   -RA         Untimed Charges    PT Eval/Re-eval Minutes 20 (P)   -RA         Total Minutes    Untimed Charges Total Minutes 20 (P)   -RA       Total Minutes 20 (P)   -RA                User Key  (r) = Recorded By, (t) = Taken By, (c) = Cosigned By      Initials Name Provider Type    Eleni Sims, PT Student PT Student                      PT G-Codes  AM-PAC 6 Clicks Score (PT): (P) 24    Eleni Parada PT Student  5/9/2025

## 2025-05-09 NOTE — PLAN OF CARE
Goal Outcome Evaluation:              Outcome Evaluation: (P) Pt presents with independence in transfers and ambulation. He does not have any safety concerns impacting his ability to return to baseline function and mobility. No inpatient skilled PT services are indicated at this time.    Anticipated Discharge Disposition (PT): (P) home with assist

## 2025-05-09 NOTE — PLAN OF CARE
Goal Outcome Evaluation:              Outcome Evaluation: Pt AxOx4. No complaints of chest pain. Home CPAP used overnight. Nicotine patch started. Up ab chong. NPO since midnight for stress test. Girlfriend expressed concern over pt potentially experiencing withdrawals and needing additional help to aid with drinking habits.

## 2025-05-10 ENCOUNTER — READMISSION MANAGEMENT (OUTPATIENT)
Dept: CALL CENTER | Facility: HOSPITAL | Age: 55
End: 2025-05-10
Payer: COMMERCIAL

## 2025-05-10 VITALS
HEIGHT: 68 IN | WEIGHT: 315 LBS | OXYGEN SATURATION: 95 % | TEMPERATURE: 97.7 F | RESPIRATION RATE: 18 BRPM | SYSTOLIC BLOOD PRESSURE: 126 MMHG | HEART RATE: 56 BPM | BODY MASS INDEX: 47.74 KG/M2 | DIASTOLIC BLOOD PRESSURE: 69 MMHG

## 2025-05-10 PROBLEM — R07.9 CHEST PAIN: Status: RESOLVED | Noted: 2025-05-08 | Resolved: 2025-05-10

## 2025-05-10 PROBLEM — R07.89 OTHER CHEST PAIN: Status: RESOLVED | Noted: 2025-05-08 | Resolved: 2025-05-10

## 2025-05-10 LAB
ALBUMIN SERPL-MCNC: 3.4 G/DL (ref 3.5–5.2)
ANION GAP SERPL CALCULATED.3IONS-SCNC: 8.3 MMOL/L (ref 5–15)
BUN SERPL-MCNC: 32 MG/DL (ref 6–20)
BUN/CREAT SERPL: 22.4 (ref 7–25)
CALCIUM SPEC-SCNC: 9 MG/DL (ref 8.6–10.5)
CHLORIDE SERPL-SCNC: 103 MMOL/L (ref 98–107)
CO2 SERPL-SCNC: 22.7 MMOL/L (ref 22–29)
CREAT SERPL-MCNC: 1.43 MG/DL (ref 0.76–1.27)
EGFRCR SERPLBLD CKD-EPI 2021: 58.6 ML/MIN/1.73
GLUCOSE SERPL-MCNC: 117 MG/DL (ref 65–99)
PHOSPHATE SERPL-MCNC: 4.4 MG/DL (ref 2.5–4.5)
POTASSIUM SERPL-SCNC: 4 MMOL/L (ref 3.5–5.2)
SODIUM SERPL-SCNC: 134 MMOL/L (ref 136–145)
WHOLE BLOOD HOLD SPECIMEN: NORMAL

## 2025-05-10 PROCEDURE — 94799 UNLISTED PULMONARY SVC/PX: CPT

## 2025-05-10 PROCEDURE — 80069 RENAL FUNCTION PANEL: CPT | Performed by: INTERNAL MEDICINE

## 2025-05-10 PROCEDURE — G0378 HOSPITAL OBSERVATION PER HR: HCPCS

## 2025-05-10 PROCEDURE — 99239 HOSP IP/OBS DSCHRG MGMT >30: CPT | Performed by: INTERNAL MEDICINE

## 2025-05-10 RX ORDER — NICOTINE 21 MG/24HR
1 PATCH, TRANSDERMAL 24 HOURS TRANSDERMAL
Qty: 30 PATCH | Refills: 0 | Status: SHIPPED | OUTPATIENT
Start: 2025-05-10

## 2025-05-10 RX ORDER — LISINOPRIL 40 MG/1
20 TABLET ORAL DAILY
Qty: 15 TABLET | Refills: 0 | Status: SHIPPED | OUTPATIENT
Start: 2025-05-10

## 2025-05-10 RX ORDER — ISOSORBIDE MONONITRATE 30 MG/1
30 TABLET, EXTENDED RELEASE ORAL
Qty: 30 TABLET | Refills: 0 | Status: SHIPPED | OUTPATIENT
Start: 2025-05-11

## 2025-05-10 RX ORDER — ATORVASTATIN CALCIUM 20 MG/1
20 TABLET, FILM COATED ORAL NIGHTLY
Qty: 30 TABLET | Refills: 0 | Status: SHIPPED | OUTPATIENT
Start: 2025-05-10

## 2025-05-10 RX ADMIN — APIXABAN 5 MG: 5 TABLET, FILM COATED ORAL at 03:56

## 2025-05-10 RX ADMIN — Medication 10 ML: at 08:08

## 2025-05-10 RX ADMIN — ATENOLOL 25 MG: 25 TABLET ORAL at 08:08

## 2025-05-10 RX ADMIN — AMLODIPINE BESYLATE 5 MG: 5 TABLET ORAL at 08:08

## 2025-05-10 RX ADMIN — ISOSORBIDE MONONITRATE 30 MG: 30 TABLET, EXTENDED RELEASE ORAL at 08:08

## 2025-05-10 RX ADMIN — ALLOPURINOL 100 MG: 100 TABLET ORAL at 08:08

## 2025-05-10 RX ADMIN — PANTOPRAZOLE SODIUM 40 MG: 40 TABLET, DELAYED RELEASE ORAL at 08:08

## 2025-05-10 NOTE — OUTREACH NOTE
Prep Survey      Flowsheet Row Responses   [facility] patient discharged from? [facility]   Is LACE score < 7 ? No   Eligibility [facility]   Date of Admission 05/08/25   Date of Discharge 05/10/25   Discharge Disposition Home or Self Care   Discharge diagnosis Chest pain   Does the patient have one of the following disease processes/diagnoses(primary or secondary)? Other   Does the patient have Home health ordered? No   Is there a DME ordered? No   Medication alerts for this patient see AVS   Prep survey completed? Yes            Milly NAPOLES - Registered Nurse              Milly NAPOLES - Registered Nurse

## 2025-05-10 NOTE — PLAN OF CARE
Goal Outcome Evaluation:              Outcome Evaluation: Patient is alert and oriented. Stress test completed this morning. On room air. No complaints of pain or chest pain. Sitting on the side of the bed during shift. IV lasix discontinued. Fluid restriction continued. Patient eager to be discharged, MD wanting to keep patient longer to check kidney fuction in the morning. Patient anxious and frustrated, MD notified and atarax ordered and administered per MAR.

## 2025-05-10 NOTE — PLAN OF CARE
Goal Outcome Evaluation:  Plan of Care Reviewed With: patient        Progress: improving  Outcome Evaluation: Alert and oriented X4. No c/o pain this shift. Slept with home cpap in place. Denies pain. Continues on 1500ml per day fluid restriction and is compliant. Hopeful to d/c soon. Will continue care per POC

## 2025-05-10 NOTE — DISCHARGE SUMMARY
[facility]        HOSPITALIST  DISCHARGE SUMMARY    Patient Name: Kevin Bradshaw  : 1970  MRN: [id number]    Date of Admission: 2025  Date of Discharge:  5/10/2025  Primary Care Physician: Jennifer Hobbs APRN    Consults       Date and Time Order Name Status Description    2025 11:19 AM Inpatient Cardiology Consult Completed             Active and Resolved Hospital Problems:  Active Hospital Problems    Diagnosis POA    Essential hypertension [I10] Yes      Resolved Hospital Problems    Diagnosis POA    **Chest pain [R07.9] Yes    Other chest pain [R07.89] Yes     Chest tightness.  Ruled out ACS like unstable angina.  Stress test low risk study.  Morbid obesity BMI 55.7.  Obstructive sleep apnea on Home CPAP  Acute on chronic diastolic CHF.  EF 56%.BNP not reliable as patient obese.  Improved  Weight gain, swelling in legs and dyspnea on exertion due to above.  Acute kidney injury.  Likely from diuresis.  Improving  Hypomagnesemia.  Supplemented  History of gout.  History of PE on Eliquis.  Hypertension.  Prediabetes.  Hemoglobin A1c of 6.2%  Pyuria and hematuria without bacteriuria.  Hospital Course     Hospital Course:  Kevin Bradshaw is a 54 y.o. male with past medical history of PE on Eliquis, obesity, LONNY on CPAP, hypertension, anxiety disorder and gout.  Patient woke up around 5 AM in the morning to use the bathroom and felt chest tightness in mid chest area lasting for few hours until he came to [facility] ED and was relieved by sublingual nitroglycerin.  It was 6/10 in intensity and nonradiating.  No new shortness of air, sweating, nausea, palpitations or dizziness with it.  Patient has gained about 40 pounds weight in past 3 months.  Has noticed increasing swelling in legs for a week or so.  Has chronic dyspnea on exertion where he has to take a break after walking 15 steps and is gradually getting worse.  Denies any PND.  Patient routinely sleeps in recliner  for many years.  Patient was recently here in the hospital few weeks ago for hypoxia with diastolic dysfunction on 2D echo on April 30.  Patient is compliant with his Eliquis.  Workup in the ED showed stable vital signs good saturation on room air.  Troponin is not significant.  BNP is negative.  Creatinine is 1.3.  CBC is negative.  Chest x-ray no acute finding.  EKG sinus rhythm no acute finding.  Because of his typical symptoms hospitalist has been called to admit him for observation.  Cardiology was consulted by ED physician.  During my evaluation patient denies any chest pain and feels back to himself  Patient seen by cardiology and had stress test done May 9.  It turned out to be low restudy and cleared by cardiology to be released.     Interval Followup:   Vitals are stable on room air.  Creatinine trending down not back to baseline yet  Has been using home CPAP.  Denies any chest pain.  Shortness of air better.  Swelling lower extremity improving.  Patient denies going through withdrawal.  Patient drinks 6 cans of beer and 2 shots of bourbon daily.  Last drink was on May 6.  Patient wants to go home      DISCHARGE Follow Up Recommendations for labs and diagnostics: PCP, pulmonary and cardiology.  Have PCP check BMP in next 3 to 4 days.  Patient to return to work on 12 May.  Home lisinopril dose decreased to make room for Imdur.      Day of Discharge     Vital Signs:  Temp:  [97.3 °F (36.3 °C)-98.4 °F (36.9 °C)] 97.7 °F (36.5 °C)  Heart Rate:  [53-70] 56  Resp:  [18] 18  BP: (116-150)/(61-78) 126/69    Physical Exam:   Constitutional: Awake, alert, no acute distress              Eyes: Pupils equal, sclerae anicteric, no conjunctival injection              HENT: NCAT, mucous membranes moist              Neck: Supple, no thyromegaly, no lymphadenopathy, trachea midline              Respiratory: Clear to auscultation bilaterally, nonlabored respirations               Cardiovascular: RRR, no murmurs, rubs, or  gallops, palpable pedal pulses bilaterally              Gastrointestinal: Positive bowel sounds, soft, nontender, nondistended              Musculoskeletal: +2 bilateral ankle edema, no clubbing or cyanosis to extremities              Psychiatric: Appropriate affect, cooperative              Neurologic: Oriented x 3, strength symmetric in all extremities, Cranial Nerves grossly intact to confrontation, speech clear              Skin: No rashes     Discharge Details        Discharge Medications        New Medications        Instructions Start Date   atorvastatin 20 MG tablet  Commonly known as: LIPITOR   20 mg, Oral, Nightly      isosorbide mononitrate 30 MG 24 hr tablet  Commonly known as: IMDUR   30 mg, Oral, Every 24 Hours Scheduled   Start Date: May 11, 2025     nicotine 21 MG/24HR patch  Commonly known as: NICODERM CQ   1 patch, Transdermal, Every 24 Hours Scheduled             Changes to Medications        Instructions Start Date   lisinopril 40 MG tablet  Commonly known as: ARGELIAILZESTRIL  What changed: how much to take   20 mg, Oral, Daily             Continue These Medications        Instructions Start Date   allopurinol 100 MG tablet  Commonly known as: ZYLOPRIM   100 mg, Oral, Daily      apixaban 5 MG tablet tablet  Commonly known as: Eliquis   5 mg, Oral, Every 12 Hours      atenolol 25 MG tablet  Commonly known as: TENORMIN   25 mg, Oral, 2 Times Daily      cyclobenzaprine 10 MG tablet  Commonly known as: FLEXERIL   10 mg, Oral, 3 Times Daily PRN      Diclofenac Sodium 1 % gel gel  Commonly known as: VOLTAREN   4 g, Topical, 4 Times Daily PRN      fluticasone 50 MCG/ACT nasal spray  Commonly known as: Flonase   2 sprays, Nasal, Daily      furosemide 20 MG tablet  Commonly known as: LASIX   20 mg, Oral, Daily      pantoprazole 40 MG EC tablet  Commonly known as: PROTONIX   40 mg, Oral, Daily             Stop These Medications      azithromycin 250 MG tablet  Commonly known as: Zithromax Z-Armin             ASK your doctor about these medications        Instructions Start Date   Semaglutide-Weight Management 0.25 MG/0.5ML solution auto-injector   0.25 mg, Subcutaneous, Weekly      Semaglutide-Weight Management 0.5 MG/0.5ML solution auto-injector   0.5 mg, Subcutaneous, Weekly               Allergies   Allergen Reactions    Capsicum Annuum Extract & Derivative (Bell Pepper) [Capsicum] GI Intolerance    Onion GI Intolerance    Other GI Intolerance     Onions, peppers       Discharge Disposition:  Home or Self Care    Diet:  Diet Instructions       Diet: Cardiac Diets; Healthy Heart (2-3 Na+); Thin (IDDSI 0)      Discharge Diet: Cardiac Diets    Cardiac Diet: Healthy Heart (2-3 Na+)    Fluid Consistency: Thin (IDDSI 0)            Discharge Activity:   Activity Instructions       Activity as Tolerated              CODE STATUS:  Code Status and Medical Interventions: CPR (Attempt to Resuscitate); Full Support   Ordered at: 05/08/25 0849     Code Status (Patient has no pulse and is not breathing):    CPR (Attempt to Resuscitate)     Medical Interventions (Patient has pulse or is breathing):    Full Support         Future Appointments   Date Time Provider Department [place]   5/20/2025 11:00 AM Rody Dawkins MD [facility]CC ETW [facility]   6/18/2025 11:00 AM Jennifer Hobbs APRN [facility] PC MEMCT [facility]   7/9/2025  8:00 AM [facility] PULM LAB ROOM 2 [facility]       Additional Instructions for the Follow-ups that You Need to Schedule       Discharge Follow-up with PCP   As directed       Currently Documented PCP:    Jennifer Hobbs APRN    PCP Phone Number:    [phone]     Follow Up Details: 1 week        Discharge Follow-up with Specified Provider: Dr. Rubio; 1 Month   As directed      To: Dr. Rubio   Follow Up: 1 Month   Follow Up Details: Chest pain                Pertinent  and/or Most Recent Results     PROCEDURES:   * Cannot find OR case *     LAB RESULTS:      Lab 05/09/25  0518 05/08/25  0644 05/05/25  0721   WBC 8.21 6.93  7.31   HEMOGLOBIN 14.3 15.5 14.8   HEMATOCRIT 44.1 46.3 45.2   PLATELETS 297 332 284   NEUTROS ABS 4.98 4.34 4.85   IMMATURE GRANS (ABS) 0.05 0.05 0.03   LYMPHS ABS 1.37 1.23 1.08   MONOS ABS 1.43* 0.98* 1.03*   EOS ABS 0.31 0.26 0.27   MCV 92.3 91.0 91.9         Lab 05/10/25  0449 05/09/25  0518 05/08/25  0644 05/05/25  0721   SODIUM 134* 136 134* 137   POTASSIUM 4.0 4.0 4.3 4.9   CHLORIDE 103 101 100 104   CO2 22.7 23.1 20.7* 19.9*   ANION GAP 8.3 11.9 13.3 13.1   BUN 32* 32* 24* 28*   CREATININE 1.43* 1.60* 1.35* 1.33*   EGFR 58.6* 51.2* 62.8 63.9   GLUCOSE 117* 105* 113* 119*   CALCIUM 9.0 9.0 8.8 8.4*   MAGNESIUM  --  1.7 1.5*  --    PHOSPHORUS 4.4  --   --   --    HEMOGLOBIN A1C  --   --  6.20*  --          Lab 05/10/25  0449 05/08/25  0644 05/05/25  0721   TOTAL PROTEIN  --  6.8 6.4   ALBUMIN 3.4* 3.6 3.3*   GLOBULIN  --  3.2 3.1   ALT (SGPT)  --  17 19   AST (SGOT)  --  26 27   BILIRUBIN  --  0.4 0.4   ALK PHOS  --  54 48   LIPASE  --  47  --          Lab 05/08/25  0810 05/08/25  0644 05/05/25  0848 05/05/25  0721   PROBNP  --  772.7  --  615.0   HSTROP T 25* 26* 22* 21         Lab 05/08/25  0810   CHOLESTEROL 128   LDL CHOL 73   HDL CHOL 36*   TRIGLYCERIDES 98             Brief Urine Lab Results  (Last result in the past 365 days)        Color   Clarity   Blood   Leuk Est   Nitrite   Protein   CREAT   Urine HCG        05/08/25 1806 Yellow   Clear   Moderate (2+)   Small (1+)   Negative   Trace                 Microbiology Results (last 10 days)       Procedure Component Value - Date/Time    Urine Culture - Urine, Urine, Clean Catch [[id number]]  (Normal) Collected: 05/08/25 1806    Lab Status: Final result Specimen: Urine, Clean Catch Updated: 05/09/25 1846     Urine Culture No growth            XR Chest 1 View  Result Date: 5/8/2025  Impression: Probably no significant acute cardiopulmonary disease is seen radiographically.    Portions of this note were completed with a voice recognition program.  Electronically Signed: Jani Steiner MD  5/8/2025 6:55 AM EDT  Workstation ID: [id number]    XR Chest 1 View  Result Date: 5/5/2025  Impression: Impression: Bilateral bronchial wall thickening which can be seen with bronchitis or reactive airway disease. No acute consolidation. Electronically Signed: Kang Vizcaino MD  5/5/2025 7:16 AM EDT  Workstation ID: [id number]    CT Angiogram Chest Pulmonary Embolism  Result Date: 4/28/2025  Impression: Impression: 1.Negative for pulmonary embolus. 2.No acute cardiopulmonary process. 3.Hepatic steatosis. Electronically Signed: Jr Christina MD  4/28/2025 8:48 AM EDT  Workstation ID: [id number]    XR Chest 1 View  Result Date: 4/28/2025  Impression: No active disease. Electronically Signed: Andrew Pack MD  4/28/2025 6:59 AM EDT  Workstation ID: [id number]      Results for orders placed during the hospital encounter of 02/17/25    Duplex Venous Upper Extremity - Right CAR    Interpretation Summary    Normal right upper extremity venous duplex scan.      Results for orders placed during the hospital encounter of 02/17/25    Duplex Venous Upper Extremity - Right CAR    Interpretation Summary    Normal right upper extremity venous duplex scan.      Results for orders placed during the hospital encounter of 05/08/25    Adult Transthoracic Echo Complete w/ Color, Spectral and Contrast if necessary per protocol    Interpretation Summary    Left ventricular systolic function is normal. Left ventricular ejection fraction appears to be 56 - 60%.    Left ventricular diastolic function was indeterminate.    There is calcification of the aortic valve without significant stenosis.    Compared to echo from 4/28/2025, Left atrial size appears to be normal.      Imaging Results (All)       Procedure Component Value Units Date/Time    XR Chest 1 View [[id number]] Collected: 05/08/25 0652     Updated: 05/08/25 0657    Narrative:      XR CHEST 1 VW    Date of exam: 5/8/2025 6:35 AM  EDT.    Comparison: 5/5/2025.    Indication: Chest pain.    FINDINGS:  A single frontal (AP or PA upright portable) chest radiograph reveals no cardiac enlargement and no dense focal lobar infiltrate. No pneumothorax is seen. No pleural effusion. There is slight pulmonary hypoinflation. External artifacts are noted.   Degenerative changes involve the imaged spine.        Impression:      Probably no significant acute cardiopulmonary disease is seen radiographically.           Portions of this note were completed with a voice recognition program.    Electronically Signed: Jani Steiner MD    5/8/2025 6:55 AM EDT    Workstation ID: [id number]             Labs Pending at Discharge:          Time spent on Discharge including face to face service: 31 minutes  Part of this note may be an electronic transcription/translation of spoken language to printed text using the Dragon Dictation System.     TElectronically signed by Anthony Molina MD, 05/10/25, 2:24 PM EDT.

## 2025-05-10 NOTE — PLAN OF CARE
Goal Outcome Evaluation:      Discharging home with self care. Patient to make follow up appointments. Discharge education complete.

## 2025-05-12 ENCOUNTER — TRANSITIONAL CARE MANAGEMENT TELEPHONE ENCOUNTER (OUTPATIENT)
Dept: CALL CENTER | Facility: HOSPITAL | Age: 55
End: 2025-05-12
Payer: COMMERCIAL

## 2025-05-12 ENCOUNTER — TELEPHONE (OUTPATIENT)
Dept: INTERNAL MEDICINE | Age: 55
End: 2025-05-12
Payer: COMMERCIAL

## 2025-05-12 DIAGNOSIS — N28.9 ABNORMAL KIDNEY FUNCTION: Primary | ICD-10-CM

## 2025-05-12 NOTE — TELEPHONE ENCOUNTER
Caller: Kevin Bradshaw    Relationship: Self    Best call back number: [phone]     What orders are you requesting (i.e. lab or imaging): LABS FOR KIDNEY FUNCTION DUE TO MEDICINE THAT WAS GIVEN TO PATIENT IN HOSPITAL    In what timeframe would the patient need to come in: AS SOON AS POSSIBLE    Where will you receive your lab/imaging services:     Additional notes:     PATIENT WANTS A CALL BACK AS WELL

## 2025-05-12 NOTE — OUTREACH NOTE
Call Center TCM Note      Flowsheet Row Responses   [facility] facility patient discharged from? [facility]   Does the patient have one of the following disease processes/diagnoses(primary or secondary)? Other   TCM attempt successful? No  [Father has no info]   Unsuccessful attempts Attempt 1            TERE Colon Registered Nurse    5/12/2025, 09:40 EDT

## 2025-05-12 NOTE — TELEPHONE ENCOUNTER
Patient was recently in the hospital and is supposed to have repeat labs to recheck his kidney functions. Please advise.

## 2025-05-12 NOTE — OUTREACH NOTE
Call Center TCM Note      Flowsheet Row Responses   [facility] patient discharged from? [facility]   Does the patient have one of the following disease processes/diagnoses(primary or secondary)? Other   TCM attempt successful? Yes   Call start time 1558   Call end time 1603   Discharge diagnosis Chest pain   Meds reviewed with patient/caregiver? Yes   Is the patient having any side effects they believe may be caused by any medication additions or changes? No   Does the patient have all medications ordered at discharge? Yes   Is the patient taking all medications as directed (includes completed medication regime)? Yes   Comments Pt does not want appt with APRN and wants a Dr in the office to take over care. Per pt and chart pt has already made PCP office aware.   Does the patient have an appointment with their PCP within 7-14 days of discharge? No   Nursing Interventions Patient declined scheduling/rescheduling appointment at this time, Routed TCM call to PCP office   Has home health visited the patient within 72 hours of discharge? N/A   Psychosocial issues? No   Did the patient receive a copy of their discharge instructions? Yes   Nursing interventions Reviewed instructions with patient   What is the patient's perception of their health status since discharge? Improving   TCM call completed? Yes   Wrap up additional comments Called placed to Pt and he states that his ankles are no better with the swelling but otherwise feeling better. Call was short. Pt does not want appt with APRN and wants a Dr in the office to take over care. Per pt and chart pt has already made PCP office aware.   Call end time 1603            TERE DENNY - Registered Nurse    5/12/2025, 16:03 EDT

## 2025-05-12 NOTE — TELEPHONE ENCOUNTER
Caller: Kevin Bradshaw A    Relationship to patient: Self    Best call back number: [phone]    Patient is needing: PATIENT CALLED REQUESTING TO SPEAK WITH SOMEONE IN THE OFFICE. HUB DID ATTEMPT TO TRANSFER THE CALL AND PATIENT WAS EXTREMELY UPSET HE COULD NOT BE TRANSFERRED TO THE OFFICE. PATIENT STATES HE HAS MEDICINE QUESTIONS AND ALSO WOULD LIKE TO SEE ABOUT TRANSFERRING HIS CARE TO A DOCTOR AND NOT A APRN. PATIENT REFUSED TO GIVE HUB ANY DETAILS AND SAID HE NEEDS SOMEONE IN THE OFFICE TO CALL HIM BACK IMMEDIATELY.

## 2025-05-17 ENCOUNTER — LAB (OUTPATIENT)
Facility: HOSPITAL | Age: 55
End: 2025-05-17
Payer: COMMERCIAL

## 2025-05-17 DIAGNOSIS — I10 ESSENTIAL HYPERTENSION: ICD-10-CM

## 2025-05-17 DIAGNOSIS — Z00.00 ANNUAL PHYSICAL EXAM: ICD-10-CM

## 2025-05-17 DIAGNOSIS — E53.8 FOLIC ACID DEFICIENCY: ICD-10-CM

## 2025-05-17 DIAGNOSIS — R73.01 IMPAIRED FASTING GLUCOSE: ICD-10-CM

## 2025-05-17 DIAGNOSIS — R31.9 HEMATURIA, UNSPECIFIED TYPE: ICD-10-CM

## 2025-05-17 DIAGNOSIS — E55.9 VITAMIN D DEFICIENCY: ICD-10-CM

## 2025-05-17 DIAGNOSIS — N28.9 ABNORMAL KIDNEY FUNCTION: ICD-10-CM

## 2025-05-17 LAB
25(OH)D3 SERPL-MCNC: 11.8 NG/ML (ref 30–100)
ALBUMIN SERPL-MCNC: 3.6 G/DL (ref 3.5–5.2)
ALBUMIN/GLOB SERPL: 1.1 G/DL
ALP SERPL-CCNC: 65 U/L (ref 39–117)
ALT SERPL W P-5'-P-CCNC: 19 U/L (ref 1–41)
ANION GAP SERPL CALCULATED.3IONS-SCNC: 12.8 MMOL/L (ref 5–15)
AST SERPL-CCNC: 25 U/L (ref 1–40)
BASOPHILS # BLD AUTO: 0.08 10*3/MM3 (ref 0–0.2)
BASOPHILS NFR BLD AUTO: 1 % (ref 0–1.5)
BILIRUB SERPL-MCNC: 0.5 MG/DL (ref 0–1.2)
BUN SERPL-MCNC: 20 MG/DL (ref 6–20)
BUN/CREAT SERPL: 14.2 (ref 7–25)
CALCIUM SPEC-SCNC: 8.9 MG/DL (ref 8.6–10.5)
CHLORIDE SERPL-SCNC: 106 MMOL/L (ref 98–107)
CHOLEST SERPL-MCNC: 98 MG/DL (ref 0–200)
CO2 SERPL-SCNC: 21.2 MMOL/L (ref 22–29)
CREAT SERPL-MCNC: 1.41 MG/DL (ref 0.76–1.27)
DEPRECATED RDW RBC AUTO: 43.8 FL (ref 37–54)
EGFRCR SERPLBLD CKD-EPI 2021: 59.6 ML/MIN/1.73
EOSINOPHIL # BLD AUTO: 0.25 10*3/MM3 (ref 0–0.4)
EOSINOPHIL NFR BLD AUTO: 3 % (ref 0.3–6.2)
ERYTHROCYTE [DISTWIDTH] IN BLOOD BY AUTOMATED COUNT: 12.9 % (ref 12.3–15.4)
FOLATE SERPL-MCNC: 8.04 NG/ML (ref 4.78–24.2)
GLOBULIN UR ELPH-MCNC: 3.2 GM/DL
GLUCOSE SERPL-MCNC: 103 MG/DL (ref 65–99)
HBA1C MFR BLD: 5.9 % (ref 4.8–5.6)
HCT VFR BLD AUTO: 48.1 % (ref 37.5–51)
HDLC SERPL-MCNC: 35 MG/DL (ref 40–60)
HGB BLD-MCNC: 15.7 G/DL (ref 13–17.7)
IMM GRANULOCYTES # BLD AUTO: 0.03 10*3/MM3 (ref 0–0.05)
IMM GRANULOCYTES NFR BLD AUTO: 0.4 % (ref 0–0.5)
LDLC SERPL CALC-MCNC: 41 MG/DL (ref 0–100)
LDLC/HDLC SERPL: 1.1 {RATIO}
LYMPHOCYTES # BLD AUTO: 1.46 10*3/MM3 (ref 0.7–3.1)
LYMPHOCYTES NFR BLD AUTO: 17.6 % (ref 19.6–45.3)
MCH RBC QN AUTO: 30.1 PG (ref 26.6–33)
MCHC RBC AUTO-ENTMCNC: 32.6 G/DL (ref 31.5–35.7)
MCV RBC AUTO: 92.1 FL (ref 79–97)
MONOCYTES # BLD AUTO: 1.23 10*3/MM3 (ref 0.1–0.9)
MONOCYTES NFR BLD AUTO: 14.9 % (ref 5–12)
NEUTROPHILS NFR BLD AUTO: 5.23 10*3/MM3 (ref 1.7–7)
NEUTROPHILS NFR BLD AUTO: 63.1 % (ref 42.7–76)
NRBC BLD AUTO-RTO: 0 /100 WBC (ref 0–0.2)
PLATELET # BLD AUTO: 359 10*3/MM3 (ref 140–450)
PMV BLD AUTO: 10.4 FL (ref 6–12)
POTASSIUM SERPL-SCNC: 4 MMOL/L (ref 3.5–5.2)
PROT SERPL-MCNC: 6.8 G/DL (ref 6–8.5)
RBC # BLD AUTO: 5.22 10*6/MM3 (ref 4.14–5.8)
SODIUM SERPL-SCNC: 140 MMOL/L (ref 136–145)
T4 FREE SERPL-MCNC: 1.49 NG/DL (ref 0.92–1.68)
TRIGL SERPL-MCNC: 122 MG/DL (ref 0–150)
TSH SERPL DL<=0.05 MIU/L-ACNC: 6.29 UIU/ML (ref 0.27–4.2)
VIT B12 BLD-MCNC: 454 PG/ML (ref 211–946)
VLDLC SERPL-MCNC: 22 MG/DL (ref 5–40)
WBC NRBC COR # BLD AUTO: 8.28 10*3/MM3 (ref 3.4–10.8)

## 2025-05-17 PROCEDURE — 80050 GENERAL HEALTH PANEL: CPT

## 2025-05-17 PROCEDURE — 82306 VITAMIN D 25 HYDROXY: CPT

## 2025-05-17 PROCEDURE — 83036 HEMOGLOBIN GLYCOSYLATED A1C: CPT

## 2025-05-17 PROCEDURE — 80061 LIPID PANEL: CPT

## 2025-05-17 PROCEDURE — 84439 ASSAY OF FREE THYROXINE: CPT

## 2025-05-17 PROCEDURE — 82607 VITAMIN B-12: CPT

## 2025-05-17 PROCEDURE — 82746 ASSAY OF FOLIC ACID SERUM: CPT

## 2025-05-17 PROCEDURE — 36415 COLL VENOUS BLD VENIPUNCTURE: CPT

## 2025-05-20 ENCOUNTER — OFFICE VISIT (OUTPATIENT)
Dept: PULMONOLOGY | Facility: CLINIC | Age: 55
End: 2025-05-20
Payer: COMMERCIAL

## 2025-05-20 ENCOUNTER — READMISSION MANAGEMENT (OUTPATIENT)
Dept: CALL CENTER | Facility: HOSPITAL | Age: 55
End: 2025-05-20
Payer: COMMERCIAL

## 2025-05-20 VITALS
BODY MASS INDEX: 47.74 KG/M2 | WEIGHT: 315 LBS | SYSTOLIC BLOOD PRESSURE: 171 MMHG | HEIGHT: 68 IN | DIASTOLIC BLOOD PRESSURE: 105 MMHG | RESPIRATION RATE: 18 BRPM | OXYGEN SATURATION: 95 % | TEMPERATURE: 98 F | HEART RATE: 67 BPM

## 2025-05-20 DIAGNOSIS — R06.09 DYSPNEA ON EXERTION: Primary | ICD-10-CM

## 2025-05-20 DIAGNOSIS — E66.01 OBESITY, MORBID, BMI 50 OR HIGHER: ICD-10-CM

## 2025-05-20 RX ORDER — HYDROCHLOROTHIAZIDE 50 MG/1
TABLET ORAL
COMMUNITY
Start: 2025-05-15

## 2025-05-20 NOTE — OUTREACH NOTE
Medical Week 2 Survey      Flowsheet Row Responses   [facility] patient discharged from? [facility]   Does the patient have one of the following disease processes/diagnoses(primary or secondary)? Other   Week 2 attempt successful? Yes   Call start time 1728   Discharge diagnosis Chest pain   Call end time 1731   Is the patient taking all medications as directed (includes completed medication regime)? No   Nursing Interventions Advised patient to call provider   Medication comments States he is not taking the atorvastatin due to states his levels were good.   Does the patient have a primary care provider?  Yes   Has the patient kept scheduled appointments due by today? Yes   Psychosocial issues? No   What is the patient's perception of their health status since discharge? Improving   Is the patient/caregiver able to teach back signs and symptoms related to disease process for when to call PCP? Yes   Is the patient/caregiver able to teach back signs and symptoms related to disease process for when to call 911? Yes   Is the patient/caregiver able to teach back the hierarchy of who to call/visit for symptoms/problems? PCP, Specialist, Home health nurse, Urgent Care, ED, 911 Yes   Additional teach back comments Doing well with no questions or needs at this time.   Week 2 Call Completed? Yes   Graduated Yes   Call end time 1731            Lynette BROWN - Licensed Nurse

## 2025-05-20 NOTE — PROGRESS NOTES
Primary Care Provider  Dony Paez MD   Referring Provider  No ref. provider found      Patient Complaint  Follow-up (Hosp) and Shortness of Breath      Subjective          Kevin Bradshaw presents to [facility]      History of Presenting Illness  Kevin Bradshaw is a 54 y.o. male with recent hospitalization for shortness of breath here for follow-up.    Since discharge patient was doing well.  However, a few days later he was seen in the ED again for chest pain.  He had a stress test which showed low risk.  2D echo showed normal EF.  Diastolic dysfunction was indeterminate.  He continues to have dyspnea on exertion  He wears his CPAP nightly for LONNY  He remains a never smoker  He does drink every day, previously 10 beers a day, now 3 beers a day  He is working on losing weight  I have personally reviewed the review of systems, past family, social, medical and surgical histories; and agree with their findings.    Review of Systems  Constitutional:  No fever. No chills. No weakness.  ENT:  No sore throat, URI symptoms.   Cardiovascular:  No chest pain. No palpitations. No lower extremity edema.  Respiratory:  No shortness of breath, cough, pleuritic chest pain. No hemoptysis.  + Exertional dyspnea.   GI:  Normal appetite. No nausea, vomiting, diarrhea. No melena.  Musculoskeletal:  No arthralgias or myalgias.  Neurologic:  No weakness    Family History   Problem Relation Age of Onset    Hypertension Mother     Hypertension Father         Social History     Socioeconomic History    Marital status:    Tobacco Use    Smoking status: Never     Passive exposure: Never    Smokeless tobacco: Current     Types: Chew   Vaping Use    Vaping status: Never Used   Substance and Sexual Activity    Alcohol use: Yes     Alcohol/week: 28.0 standard drinks of alcohol     Types: 28 Standard drinks or equivalent per week     Comment: occais    Drug use: Never    Sexual  activity: Defer        Past Medical History:   Diagnosis Date    Allergies     Anxiety 08/17/2021    Degeneration of lumbar intervertebral disc 02/05/2016    Fatigue 02/02/2023    GERD (gastroesophageal reflux disease)     Gout     High blood pressure     Hypertension     Impaired fasting glucose 03/15/2022    Male hypogonadism 07/28/2021    Mid back pain 02/24/2016    Obstructive sleep apnea 07/28/2021    Polycythemia, secondary 03/15/2022    Related to ETOH abuse     Pulmonary embolism     Tendinitis of elbow 07/16/2021    Thoracic back pain     Vitamin D deficiency 07/28/2021        Immunization History   Administered Date(s) Administered    COVID-19 (PFIZER) Purple Cap Monovalent 07/08/2021, 07/29/2021       Allergies   Allergen Reactions    Capsicum Annuum Extract & Derivative (Bell Pepper) [Capsicum] GI Intolerance    Onion GI Intolerance    Other GI Intolerance     Onions, peppers          Current Outpatient Medications:     allopurinol (ZYLOPRIM) 100 MG tablet, Take 1 tablet by mouth Daily., Disp: 30 tablet, Rfl: 5    apixaban (Eliquis) 5 MG tablet tablet, Take 1 tablet by mouth Every 12 (Twelve) Hours., Disp: 60 tablet, Rfl: 5    atenolol (TENORMIN) 25 MG tablet, Take 1 tablet by mouth 2 (Two) Times a Day., Disp: 60 tablet, Rfl: 5    atorvastatin (LIPITOR) 20 MG tablet, Take 1 tablet by mouth Every Night., Disp: 30 tablet, Rfl: 0    cyclobenzaprine (FLEXERIL) 10 MG tablet, Take 1 tablet by mouth 3 (Three) Times a Day As Needed for Muscle Spasms., Disp: 30 tablet, Rfl: 0    Diclofenac Sodium (VOLTAREN) 1 % gel gel, Apply 4 g topically to the appropriate area as directed 4 (Four) Times a Day As Needed (as needed for pain). (Patient taking differently: Apply 4 g topically to the appropriate area as directed 4 (Four) Times a Day As Needed (as needed for pain). Right elbow), Disp: 100 g, Rfl: 1    fluticasone (Flonase) 50 MCG/ACT nasal spray, Administer 2 sprays into the nostril(s) as directed by provider Daily.  "(Patient taking differently: Administer 2 sprays into the nostril(s) as directed by provider Daily As Needed for Rhinitis or Allergies.), Disp: 9.9 g, Rfl: 3    furosemide (LASIX) 20 MG tablet, Take 1 tablet by mouth Daily., Disp: 14 tablet, Rfl: 0    hydroCHLOROthiazide 50 MG tablet, , Disp: , Rfl:     isosorbide mononitrate (IMDUR) 30 MG 24 hr tablet, Take 1 tablet by mouth Daily., Disp: 30 tablet, Rfl: 0    lisinopril (PRINIVIL,ZESTRIL) 40 MG tablet, Take 0.5 tablets by mouth Daily., Disp: 15 tablet, Rfl: 0    nicotine (NICODERM CQ) 21 MG/24HR patch, Place 1 patch on the skin as directed by provider Daily., Disp: 30 patch, Rfl: 0    pantoprazole (PROTONIX) 40 MG EC tablet, Take 1 tablet by mouth Daily., Disp: 90 tablet, Rfl: 1    Semaglutide-Weight Management 0.25 MG/0.5ML solution auto-injector, Inject 0.5 mL under the skin into the appropriate area as directed 1 (One) Time Per Week. (Patient not taking: Reported on 5/20/2025), Disp: 2 mL, Rfl: 0    Semaglutide-Weight Management 0.5 MG/0.5ML solution auto-injector, Inject 0.5 mL under the skin into the appropriate area as directed 1 (One) Time Per Week. (Patient not taking: Reported on 5/20/2025), Disp: 2 mL, Rfl: 0     Objective     Vital Signs:   BP (!) 171/105 (BP Location: Left arm, Patient Position: Sitting, Cuff Size: Adult)   Pulse 67   Temp 98 °F (36.7 °C) (Temporal)   Resp 18   Ht 172.7 cm (68\")   Wt (!) 161 kg (356 lb)   SpO2 95% Comment: Room air  BMI 54.13 kg/m²     Physical Exam  Vital Signs Reviewed   WDWN, Alert, NAD.    HEENT:  EOMI.  nares clear  Neck:  Supple, no JVD, no thyromegaly  Chest:  clear to auscultation bilaterally, no wheezes, crackles; no work of breathing noted  CV: RRR, no M/G/R, pulses 2+, equal.  Abd:  Soft, NT, ND, +BS, obesity  EXT:  no clubbing, no cyanosis, no edema  Neuro:  A&Ox3, CN grossly intact, no focal deficits.  Skin: No rashes or lesions noted       Result Review :   I have personally reviewed patient's labs " and images.              Patient Active Problem List   Diagnosis    Tendinitis of elbow    Chronic gouty arthritis    Degeneration of lumbar intervertebral disc    Essential hypertension    Gastro-esophageal reflux disease without esophagitis    Male hypogonadism    Obstructive sleep apnea    Low back pain    Thoracic back pain    Vitamin D deficiency    History of pulmonary embolus (PE)    Anxiety    Impaired fasting glucose    Polycythemia, secondary    Nausea and vomiting    Other fatigue    Obesity, morbid, BMI 50 or higher    Acute pain of right knee    Actinic keratoses    Seasonal allergic rhinitis    Episode of memory loss    Low back strain, subsequent encounter    Folic acid deficiency    Shortness of breath    Hepatic steatosis    Hematuria    Diastolic CHF, chronic       Impression and Plan    Dyspnea on exertion  LONNY on CPAP  Obesity, BMI 54.1  Alcohol use, 3 beers per day    Patient's symptoms largely due to deconditioning and overweight  We did a extensive workup and patient which cannot explain his dyspnea on exertion  He had a stress test which showed low risk study  EF with normal EF and indeterminate diastolic dysfunction  Continue CPAP nightly for LONNY  He is a never smoker  PFTs were ordered, scheduled for July.  Will follow-up with her after  Encouraged weight loss and alcohol cessation    Smoking status: Reviewed  Vaccination status: Patient refuse all vaccinations at this time  Medications personally reviewed    Follow Up   No follow-ups on file.  Patient was given instructions and counseling regarding his condition or for health maintenance advice. Please see specific information pulled into the AVS if appropriate.

## 2025-05-23 ENCOUNTER — HOSPITAL ENCOUNTER (EMERGENCY)
Facility: HOSPITAL | Age: 55
Discharge: HOME | End: 2025-05-23
Attending: EMERGENCY MEDICINE
Payer: COMMERCIAL

## 2025-05-23 VITALS
RESPIRATION RATE: 16 BRPM | HEART RATE: 66 BPM | SYSTOLIC BLOOD PRESSURE: 165 MMHG | DIASTOLIC BLOOD PRESSURE: 92 MMHG | WEIGHT: 315 LBS | TEMPERATURE: 98.1 F | OXYGEN SATURATION: 99 % | BODY MASS INDEX: 47.74 KG/M2 | HEIGHT: 68 IN

## 2025-05-23 DIAGNOSIS — S39.012A LUMBOSACRAL STRAIN, INITIAL ENCOUNTER: Primary | ICD-10-CM

## 2025-05-23 PROCEDURE — 25010000002 KETOROLAC TROMETHAMINE PER 15 MG: Performed by: EMERGENCY MEDICINE

## 2025-05-23 PROCEDURE — 99282 EMERGENCY DEPT VISIT SF MDM: CPT

## 2025-05-23 PROCEDURE — 96372 THER/PROPH/DIAG INJ SC/IM: CPT

## 2025-05-23 RX ORDER — KETOROLAC TROMETHAMINE 30 MG/ML
60 INJECTION, SOLUTION INTRAMUSCULAR; INTRAVENOUS ONCE
Status: COMPLETED | OUTPATIENT
Start: 2025-05-23 | End: 2025-05-23

## 2025-05-23 RX ORDER — ORPHENADRINE CITRATE 100 MG/1
100 TABLET ORAL 2 TIMES DAILY
Qty: 14 TABLET | Refills: 0 | Status: SHIPPED | OUTPATIENT
Start: 2025-05-23

## 2025-05-23 RX ADMIN — KETOROLAC TROMETHAMINE 60 MG: 30 INJECTION, SOLUTION INTRAMUSCULAR; INTRAVENOUS at 08:38

## 2025-05-23 NOTE — ED PROVIDER NOTES
Time: 8:23 AM EDT  Date of encounter:  5/23/2025  Independent Historian/Clinical History and Information was obtained by:   Patient    History is limited by: N/A    Chief Complaint: Back pain.      History of Present Illness:  Patient is a 54 y.o. year old male who presents to the emergency department for evaluation of back pain.  Patient reports has a history of back pain he reports he was sitting on bleachers for a long time last night at a sporting event and his back is tightened up and very painful today.      Patient Care Team  Primary Care Provider: Dony Paez MD    Past Medical History:     Allergies   Allergen Reactions    Capsicum Annuum Extract & Derivative (Bell Pepper) [Capsicum] GI Intolerance    Onion GI Intolerance    Other GI Intolerance     Onions, peppers     Past Medical History:   Diagnosis Date    Allergies     Anxiety 08/17/2021    Degeneration of lumbar intervertebral disc 02/05/2016    Fatigue 02/02/2023    GERD (gastroesophageal reflux disease)     Gout     High blood pressure     Hypertension     Impaired fasting glucose 03/15/2022    Male hypogonadism 07/28/2021    Mid back pain 02/24/2016    Obstructive sleep apnea 07/28/2021    Polycythemia, secondary 03/15/2022    Related to ETOH abuse     Pulmonary embolism     Tendinitis of elbow 07/16/2021    Thoracic back pain     Vitamin D deficiency 07/28/2021     Past Surgical History:   Procedure Laterality Date    CHOLECYSTECTOMY      GALLBLADDER SURGERY      HEMORRHOIDECTOMY      LUMBAR EPIDURAL INJECTION  2015    TONSILLECTOMY       Family History   Problem Relation Age of Onset    Hypertension Mother     Hypertension Father        Home Medications:  Prior to Admission medications    Medication Sig Start Date End Date Taking? Authorizing Provider   allopurinol (ZYLOPRIM) 100 MG tablet Take 1 tablet by mouth Daily. 12/18/24   Jennifer Hobbs APRN   apixaban (Eliquis) 5 MG tablet tablet Take 1 tablet by mouth Every 12 (Twelve)  Hours. 12/18/24   Jennifer Hobbs APRN   atenolol (TENORMIN) 25 MG tablet Take 1 tablet by mouth 2 (Two) Times a Day. 12/18/24   Jennifer Hobbs APRN   atorvastatin (LIPITOR) 20 MG tablet Take 1 tablet by mouth Every Night. 5/10/25   Anthony Molina MD   cyclobenzaprine (FLEXERIL) 10 MG tablet Take 1 tablet by mouth 3 (Three) Times a Day As Needed for Muscle Spasms. 4/6/25   Dasha Ovalles APRN   Diclofenac Sodium (VOLTAREN) 1 % gel gel Apply 4 g topically to the appropriate area as directed 4 (Four) Times a Day As Needed (as needed for pain).  Patient taking differently: Apply 4 g topically to the appropriate area as directed 4 (Four) Times a Day As Needed (as needed for pain). Right elbow 2/25/25   Cm Rosenthal MD   fluticasone (Flonase) 50 MCG/ACT nasal spray Administer 2 sprays into the nostril(s) as directed by provider Daily.  Patient taking differently: Administer 2 sprays into the nostril(s) as directed by provider Daily As Needed for Rhinitis or Allergies. 12/18/24   Jennifer Hobbs APRN   furosemide (LASIX) 20 MG tablet Take 1 tablet by mouth Daily. 5/5/25   Paolo Gore MD   hydroCHLOROthiazide 50 MG tablet  5/15/25   ProviderAbbie MD   isosorbide mononitrate (IMDUR) 30 MG 24 hr tablet Take 1 tablet by mouth Daily. 5/11/25   Anthony Molina MD   lisinopril (PRINIVIL,ZESTRIL) 40 MG tablet Take 0.5 tablets by mouth Daily. 5/10/25   Anthony Molina MD   nicotine (NICODERM CQ) 21 MG/24HR patch Place 1 patch on the skin as directed by provider Daily. 5/10/25   Anthony Molina MD   orphenadrine (NORFLEX) 100 MG 12 hr tablet Take 1 tablet by mouth 2 (Two) Times a Day. 5/23/25   Neo Oro MD   pantoprazole (PROTONIX) 40 MG EC tablet Take 1 tablet by mouth Daily. 12/18/24   Jennifer Hobbs APRN   Semaglutide-Weight Management 0.25 MG/0.5ML solution auto-injector Inject 0.5 mL under the skin into the appropriate area as directed 1 (One) Time Per Week.  Patient not taking:  "Reported on 5/20/2025 4/30/25   Jennifer Hobbs APRN   Semaglutide-Weight Management 0.5 MG/0.5ML solution auto-injector Inject 0.5 mL under the skin into the appropriate area as directed 1 (One) Time Per Week.  Patient not taking: Reported on 5/20/2025 4/30/25   Jennifer Hobbs APRN        Social History:   Social History     Tobacco Use    Smoking status: Never     Passive exposure: Never    Smokeless tobacco: Current     Types: Chew   Vaping Use    Vaping status: Never Used   Substance Use Topics    Alcohol use: Yes     Alcohol/week: 28.0 standard drinks of alcohol     Types: 28 Standard drinks or equivalent per week     Comment: occais--\"3 per day\"    Drug use: Never         Review of Systems:  Review of Systems   Constitutional:  Negative for chills and fever.   HENT:  Negative for congestion, rhinorrhea and sore throat.    Eyes:  Negative for pain and visual disturbance.   Respiratory:  Negative for apnea, cough, chest tightness and shortness of breath.    Cardiovascular:  Negative for chest pain and palpitations.   Gastrointestinal:  Negative for abdominal pain, diarrhea, nausea and vomiting.   Genitourinary:  Negative for difficulty urinating and dysuria.   Musculoskeletal:  Negative for joint swelling and myalgias.   Skin:  Negative for color change.   Neurological:  Negative for seizures and headaches.   Psychiatric/Behavioral: Negative.     All other systems reviewed and are negative.       Physical Exam:  /92 (BP Location: Right arm, Patient Position: Sitting)   Pulse 66   Temp 98.1 °F (36.7 °C) (Oral)   Resp 16   Ht 172.7 cm (68\")   Wt (!) 163 kg (358 lb 4 oz)   SpO2 99%   BMI 54.47 kg/m²     Physical Exam  Vitals and nursing note reviewed.   Constitutional:       General: He is not in acute distress.     Appearance: Normal appearance. He is not toxic-appearing.   HENT:      Head: Normocephalic and atraumatic.      Jaw: There is normal jaw occlusion.   Eyes:      General: Lids are " normal.      Extraocular Movements: Extraocular movements intact.      Conjunctiva/sclera: Conjunctivae normal.      Pupils: Pupils are equal, round, and reactive to light.   Cardiovascular:      Rate and Rhythm: Normal rate and regular rhythm.      Pulses: Normal pulses.      Heart sounds: Normal heart sounds.   Pulmonary:      Effort: Pulmonary effort is normal. No respiratory distress.      Breath sounds: Normal breath sounds. No wheezing or rhonchi.   Abdominal:      General: Abdomen is flat.      Palpations: Abdomen is soft.      Tenderness: There is no abdominal tenderness. There is no guarding or rebound.   Musculoskeletal:      Cervical back: Normal range of motion and neck supple.      Right lower leg: No edema.      Left lower leg: No edema.      Comments: Tenderness bilateral lumbar paraspinal muscle spasms.   Skin:     General: Skin is warm and dry.   Neurological:      Mental Status: He is alert and oriented to person, place, and time. Mental status is at baseline.   Psychiatric:         Mood and Affect: Mood normal.                    Medical Decision Making:      Comorbidities that affect care:    LONNY, obesity, hypertension, chronic back pain    External Notes reviewed:    Previous Clinic Note: Pulmonology office visit for dyspnea management      The following orders were placed and all results were independently analyzed by me:  No orders of the defined types were placed in this encounter.      Medications Given in the Emergency Department:  Medications   ketorolac (TORADOL) injection 60 mg (60 mg Intramuscular Given 5/23/25 0838)        ED Course:         Labs:    Lab Results (last 24 hours)       ** No results found for the last 24 hours. **             Imaging:    No Radiology Exams Resulted Within Past 24 Hours      Differential Diagnosis and Discussion:    Back Pain: The patient presents with back pain. My differential diagnosis includes but is not limited to acute spinal epidural abscess, acute  spinal epidural bleed, cauda equina syndrome, abdominal aortic aneurysm, aortic dissection, kidney stone, pyelonephritis, musculoskeletal back pain, spinal fracture, and osteoarthritis.     PROCEDURES:        No orders to display       Procedures    MDM  Number of Diagnoses or Management Options  Lumbosacral strain, initial encounter  Diagnosis management comments: In summary this is a 54-year-old male patient who presents for evaluation treatment of back pain.  He does have muscle spasms bilateral paraspinal muscles lumbar region.  He was given Toradol injection in the Emergency Department.  He will be discharged home with prescription muscle relaxers.  Very strict return to ER and follow-up instructions have been provided to the patient.                         Patient Care Considerations:    LABS: I considered ordering labs, however no signs of metabolic derangement      Consultants/Shared Management Plan:    None    Social Determinants of Health:    Patient is independent, reliable, and has access to care.       Disposition and Care Coordination:    Discharged: The patient is suitable and stable for discharge with no need for consideration of admission.    I have explained the patient´s condition, diagnoses and treatment plan based on the information available to me at this time. I have answered questions and addressed any concerns. The patient has a good  understanding of the patient´s diagnosis, condition, and treatment plan as can be expected at this point. The vital signs have been stable. The patient´s condition is stable and appropriate for discharge from the emergency department.      The patient will pursue further outpatient evaluation with the primary care physician or other designated or consulting physician as outlined in the discharge instructions. They are agreeable to this plan of care and follow-up instructions have been explained in detail. The patient has received these instructions in written  format and has expressed an understanding of the discharge instructions. The patient is aware that any significant change in condition or worsening of symptoms should prompt an immediate return to this or the closest emergency department or call to 911.  I have explained discharge medications and the need for follow up with the patient/caretakers. This was also printed in the discharge instructions. Patient was discharged with the following medications and follow up:      Medication List        New Prescriptions      orphenadrine 100 MG 12 hr tablet  Commonly known as: NORFLEX  Take 1 tablet by mouth 2 (Two) Times a Day.            Changed      Diclofenac Sodium 1 % gel gel  Commonly known as: VOLTAREN  Apply 4 g topically to the appropriate area as directed 4 (Four) Times a Day As Needed (as needed for pain).  What changed: additional instructions     fluticasone 50 MCG/ACT nasal spray  Commonly known as: Flonase  Administer 2 sprays into the nostril(s) as directed by provider Daily.  What changed:   when to take this  reasons to take this               Where to Get Your Medications        These medications were sent to [facility] [id number] - [place], [place] - [address] [facility] [address] - [phone]  - [phone] FX  [address], [place] [address]      Phone: [phone]   orphenadrine 100 MG 12 hr tablet      Dony Paez MD  [address]  [address]  [place] [address]  [phone]    In 1 week         Final diagnoses:   Lumbosacral strain, initial encounter        ED Disposition       ED Disposition   Discharge    Condition   Stable    Comment   --               This medical record created using voice recognition software.             Neo Oro MD  05/23/25 2236

## 2025-05-23 NOTE — Clinical Note
[facility] EMERGENCY ROOM  [address] [place] [address]  Phone: [phone]  Fax: [phone]    Kevin Bradshaw was seen and treated in our emergency department on 5/23/2025.  He may return to work on 05/27/2025.         Thank you for choosing [facility].    Neo Oro MD

## 2025-05-23 NOTE — Clinical Note
[facility] EMERGENCY ROOM  [address] [place] [address]  Phone: [phone]  Fax: [phone]    Kevin Bradshaw was seen and treated in our emergency department on 5/23/2025.  He may return to work on 05/27/2025.  May return to work with no restrictions       Thank you for choosing [facility].    Neo Oro MD

## 2025-05-23 NOTE — Clinical Note
[facility] EMERGENCY ROOM  [address] [place] [address]  Phone: [phone]  Fax: [phone]    Kevin Bradshaw was seen and treated in our emergency department on 5/23/2025.  He may return to work on 05/26/2025.         Thank you for choosing [facility].    Neo Oro MD

## 2025-06-03 ENCOUNTER — TELEPHONE (OUTPATIENT)
Dept: INTERNAL MEDICINE | Age: 55
End: 2025-06-03

## 2025-06-03 ENCOUNTER — OFFICE VISIT (OUTPATIENT)
Dept: INTERNAL MEDICINE | Age: 55
End: 2025-06-03
Payer: COMMERCIAL

## 2025-06-03 VITALS
BODY MASS INDEX: 47.74 KG/M2 | HEART RATE: 68 BPM | OXYGEN SATURATION: 93 % | SYSTOLIC BLOOD PRESSURE: 182 MMHG | WEIGHT: 315 LBS | TEMPERATURE: 98.2 F | DIASTOLIC BLOOD PRESSURE: 102 MMHG | HEIGHT: 68 IN

## 2025-06-03 DIAGNOSIS — E66.01 OBESITY, MORBID, BMI 50 OR HIGHER: ICD-10-CM

## 2025-06-03 DIAGNOSIS — K21.9 GASTRO-ESOPHAGEAL REFLUX DISEASE WITHOUT ESOPHAGITIS: ICD-10-CM

## 2025-06-03 DIAGNOSIS — E66.01 MORBID (SEVERE) OBESITY DUE TO EXCESS CALORIES: ICD-10-CM

## 2025-06-03 DIAGNOSIS — Z86.711 HISTORY OF PULMONARY EMBOLUS (PE): ICD-10-CM

## 2025-06-03 DIAGNOSIS — G47.33 OBSTRUCTIVE SLEEP APNEA: ICD-10-CM

## 2025-06-03 DIAGNOSIS — N18.31 STAGE 3A CHRONIC KIDNEY DISEASE: ICD-10-CM

## 2025-06-03 DIAGNOSIS — R06.09 DYSPNEA ON EXERTION: ICD-10-CM

## 2025-06-03 DIAGNOSIS — M1A.9XX0 CHRONIC GOUTY ARTHRITIS: ICD-10-CM

## 2025-06-03 DIAGNOSIS — I10 ESSENTIAL HYPERTENSION: Primary | ICD-10-CM

## 2025-06-03 DIAGNOSIS — E78.2 MIXED HYPERLIPIDEMIA: ICD-10-CM

## 2025-06-03 DIAGNOSIS — R73.01 IMPAIRED FASTING GLUCOSE: ICD-10-CM

## 2025-06-03 DIAGNOSIS — Z12.5 SCREENING PSA (PROSTATE SPECIFIC ANTIGEN): ICD-10-CM

## 2025-06-03 DIAGNOSIS — K76.0 HEPATIC STEATOSIS: ICD-10-CM

## 2025-06-03 DIAGNOSIS — I50.32 DIASTOLIC CHF, CHRONIC: ICD-10-CM

## 2025-06-03 RX ORDER — CARVEDILOL 12.5 MG/1
12.5 TABLET ORAL 2 TIMES DAILY WITH MEALS
Qty: 60 TABLET | Refills: 5 | Status: SHIPPED | OUTPATIENT
Start: 2025-06-03

## 2025-06-03 NOTE — TELEPHONE ENCOUNTER
Caller: Kevin Bradshaw    Relationship to patient: Self      Best call back number: [phone]     Provider: HARTMAN     Medication PA needed: WEGOVY

## 2025-06-03 NOTE — PROGRESS NOTES
"Chief Complaint   Patient presents with    Hypertension     Transfer of care from Jennifer, Pt states being in the [facility] 05/08/2025, Pt is wanting to discuss that hospital visit. Bilateral shoulder blades.   He has been in the emergency room with a lumbar sacral strain and trochanteric bursitis May 23 and May 27, 2025,  Hospital course from May 8 through May 10, 2025 reviewed, came in with chest tightness rule out myocardial infarction,    Objective   Vital Signs  Vitals:    06/03/25 1041   BP: (!) 182/102   BP Location: Left arm   Patient Position: Sitting   Pulse: 68   Temp: 98.2 °F (36.8 °C)   SpO2: 93%   Weight: (!) 163 kg (359 lb)   Height: 172.7 cm (67.99\")      Body mass index is 54.6 kg/m².  Review of Systems   Constitutional: Negative.    HENT: Negative.     Eyes: Negative.    Respiratory:  Positive for shortness of breath.    Cardiovascular: Negative.    Gastrointestinal: Negative.    Endocrine: Negative.    Genitourinary: Negative.    Musculoskeletal: Negative.    Allergic/Immunologic: Negative.    Neurological: Negative.    Hematological: Negative.    Psychiatric/Behavioral: Negative.        Physical Exam  Constitutional:       General: He is not in acute distress.     Appearance: Normal appearance. He is obese.   HENT:      Head: Normocephalic.      Mouth/Throat:      Mouth: Mucous membranes are moist.   Eyes:      Conjunctiva/sclera: Conjunctivae normal.      Pupils: Pupils are equal, round, and reactive to light.   Cardiovascular:      Rate and Rhythm: Normal rate and regular rhythm.      Pulses: Normal pulses.      Heart sounds: Normal heart sounds.   Pulmonary:      Effort: Pulmonary effort is normal.      Breath sounds: Normal breath sounds.   Abdominal:      General: Bowel sounds are normal.      Palpations: Abdomen is soft.   Musculoskeletal:         General: No swelling. Normal range of motion.      Cervical back: Neck supple.      Right lower leg: Edema present.      Left lower leg: Edema present. "   Skin:     General: Skin is warm and dry.      Coloration: Skin is not jaundiced.   Neurological:      General: No focal deficit present.      Mental Status: He is alert and oriented to person, place, and time. Mental status is at baseline.   Psychiatric:         Mood and Affect: Mood normal.         Behavior: Behavior normal.         Thought Content: Thought content normal.         Judgment: Judgment normal.        Result Review :   Lab Results   Component Value Date    PROBNP 772.7 05/08/2025    PROBNP 615.0 05/05/2025    PROBNP 565.6 04/28/2025     03/22/2021    BNP 53 01/27/2021     CMP          5/9/2025    05:18 5/10/2025    04:49 5/17/2025    08:32   CMP   Glucose 105  117  103    BUN 32  32  20    Creatinine 1.60  1.43  1.41    EGFR 51.2  58.6  59.6    Sodium 136  134  140    Potassium 4.0  4.0  4.0    Chloride 101  103  106    Calcium 9.0  9.0  8.9    Total Protein   6.8    Albumin  3.4  3.6    Globulin   3.2    Total Bilirubin   0.5    Alkaline Phosphatase   65    AST (SGOT)   25    ALT (SGPT)   19    Albumin/Globulin Ratio   1.1    BUN/Creatinine Ratio 20.0  22.4  14.2    Anion Gap 11.9  8.3  12.8      CBC w/diff          5/8/2025    06:44 5/9/2025    05:18 5/17/2025    08:32   CBC w/Diff   WBC 6.93  8.21  8.28    RBC 5.09  4.78  5.22    Hemoglobin 15.5  14.3  15.7    Hematocrit 46.3  44.1  48.1    MCV 91.0  92.3  92.1    MCH 30.5  29.9  30.1    MCHC 33.5  32.4  32.6    RDW 13.2  13.0  12.9    Platelets 332  297  359    Neutrophil Rel % 62.7  60.6  63.1    Immature Granulocyte Rel % 0.7  0.6  0.4    Lymphocyte Rel % 17.7  16.7  17.6    Monocyte Rel % 14.1  17.4  14.9    Eosinophil Rel % 3.8  3.8  3.0    Basophil Rel % 1.0  0.9  1.0       Lipid Panel          9/14/2024    08:22 5/8/2025    08:10 5/17/2025    08:32   Lipid Panel   Total Cholesterol 156  128  98    Triglycerides 86  98  122    HDL Cholesterol 50  36  35    VLDL Cholesterol 16  19  22    LDL Cholesterol  90  73  41    LDL/HDL Ratio  1.78  2.01  1.10       Lab Results   Component Value Date    TSH 6.290 (H) 05/17/2025    TSH 5.460 (H) 04/28/2025    TSH 2.020 09/14/2024      Lab Results   Component Value Date    FREET4 1.49 05/17/2025    FREET4 1.19 04/28/2025    FREET4 1.42 09/14/2024      A1C Last 3 Results          9/14/2024    08:22 5/8/2025    06:44 5/17/2025    08:32   HGBA1C Last 3 Results   Hemoglobin A1C 5.80  6.20  5.90       PSA          9/14/2024    08:22   PSA   PSA 1.140                     Visit Diagnoses:    ICD-10-CM ICD-9-CM   1. Essential hypertension  I10 401.9   2. Obstructive sleep apnea  G47.33 327.23   3. Hepatic steatosis  K76.0 571.8   4. Diastolic CHF, chronic  I50.32 428.32     428.0   5. Dyspnea on exertion  R06.09 786.09   6. History of pulmonary embolus (PE)  Z86.711 V12.55   7. Impaired fasting glucose  R73.01 790.21   8. Obesity, morbid, BMI 50 or higher  E66.01 278.01   9. Chronic gouty arthritis  M1A.9XX0 274.02   10. Gastro-esophageal reflux disease without esophagitis  K21.9 530.81   11. Mixed hyperlipidemia  E78.2 272.2   12. Stage 3a chronic kidney disease  N18.31 585.3   13. Screening PSA (prostate specific antigen)  Z12.5 V76.44   14. Morbid (severe) obesity due to excess calories  E66.01 278.01       Assessment and Plan   Diagnoses and all orders for this visit:    1. Essential hypertension (Primary)  -     CBC & Differential; Future  -     Comprehensive Metabolic Panel; Future  -     PSA Screen; Future  -     Hemoglobin A1c; Future  -     Lipid Panel; Future  -     Uric Acid; Future  -     CBC & Differential; Future  -     Comprehensive Metabolic Panel; Future  -     Hemoglobin A1c; Future  -     Lipid Panel; Future  -     TSH+Free T4; Future    2. Obstructive sleep apnea  -     CBC & Differential; Future  -     Comprehensive Metabolic Panel; Future  -     PSA Screen; Future  -     Hemoglobin A1c; Future  -     Lipid Panel; Future  -     Uric Acid; Future  -     CBC & Differential; Future  -      Comprehensive Metabolic Panel; Future  -     Hemoglobin A1c; Future  -     Lipid Panel; Future  -     TSH+Free T4; Future    3. Hepatic steatosis  -     CBC & Differential; Future  -     Comprehensive Metabolic Panel; Future  -     PSA Screen; Future  -     Hemoglobin A1c; Future  -     Lipid Panel; Future  -     Uric Acid; Future  -     CBC & Differential; Future  -     Comprehensive Metabolic Panel; Future  -     Hemoglobin A1c; Future  -     Lipid Panel; Future  -     TSH+Free T4; Future    4. Diastolic CHF, chronic  -     CBC & Differential; Future  -     Comprehensive Metabolic Panel; Future  -     PSA Screen; Future  -     Hemoglobin A1c; Future  -     Lipid Panel; Future  -     Uric Acid; Future  -     CBC & Differential; Future  -     Comprehensive Metabolic Panel; Future  -     Hemoglobin A1c; Future  -     Lipid Panel; Future  -     TSH+Free T4; Future    5. Dyspnea on exertion  -     CBC & Differential; Future  -     Comprehensive Metabolic Panel; Future  -     PSA Screen; Future  -     Hemoglobin A1c; Future  -     Lipid Panel; Future  -     Uric Acid; Future  -     CBC & Differential; Future  -     Comprehensive Metabolic Panel; Future  -     Hemoglobin A1c; Future  -     Lipid Panel; Future  -     TSH+Free T4; Future    6. History of pulmonary embolus (PE)  -     CBC & Differential; Future  -     Comprehensive Metabolic Panel; Future  -     PSA Screen; Future  -     Hemoglobin A1c; Future  -     Lipid Panel; Future  -     Uric Acid; Future  -     CBC & Differential; Future  -     Comprehensive Metabolic Panel; Future  -     Hemoglobin A1c; Future  -     Lipid Panel; Future  -     TSH+Free T4; Future    7. Impaired fasting glucose  -     CBC & Differential; Future  -     Comprehensive Metabolic Panel; Future  -     PSA Screen; Future  -     Hemoglobin A1c; Future  -     Lipid Panel; Future  -     Uric Acid; Future  -     CBC & Differential; Future  -     Comprehensive Metabolic Panel; Future  -      Hemoglobin A1c; Future  -     Lipid Panel; Future  -     TSH+Free T4; Future    8. Obesity, morbid, BMI 50 or higher  -     CBC & Differential; Future  -     Comprehensive Metabolic Panel; Future  -     PSA Screen; Future  -     Hemoglobin A1c; Future  -     Lipid Panel; Future  -     Uric Acid; Future  -     CBC & Differential; Future  -     Comprehensive Metabolic Panel; Future  -     Hemoglobin A1c; Future  -     Lipid Panel; Future  -     TSH+Free T4; Future    9. Chronic gouty arthritis  -     CBC & Differential; Future  -     Comprehensive Metabolic Panel; Future  -     PSA Screen; Future  -     Hemoglobin A1c; Future  -     Lipid Panel; Future  -     Uric Acid; Future  -     CBC & Differential; Future  -     Comprehensive Metabolic Panel; Future  -     Hemoglobin A1c; Future  -     Lipid Panel; Future  -     TSH+Free T4; Future    10. Gastro-esophageal reflux disease without esophagitis  -     CBC & Differential; Future  -     Comprehensive Metabolic Panel; Future  -     PSA Screen; Future  -     Hemoglobin A1c; Future  -     Lipid Panel; Future  -     Uric Acid; Future  -     CBC & Differential; Future  -     Comprehensive Metabolic Panel; Future  -     Hemoglobin A1c; Future  -     Lipid Panel; Future  -     TSH+Free T4; Future    11. Mixed hyperlipidemia  -     CBC & Differential; Future  -     Comprehensive Metabolic Panel; Future  -     PSA Screen; Future  -     Hemoglobin A1c; Future  -     Lipid Panel; Future  -     Uric Acid; Future  -     CBC & Differential; Future  -     Comprehensive Metabolic Panel; Future  -     Hemoglobin A1c; Future  -     Lipid Panel; Future  -     TSH+Free T4; Future    12. Stage 3a chronic kidney disease  -     CBC & Differential; Future  -     Comprehensive Metabolic Panel; Future  -     PSA Screen; Future  -     Hemoglobin A1c; Future  -     Lipid Panel; Future  -     Uric Acid; Future  -     CBC & Differential; Future  -     Comprehensive Metabolic Panel; Future  -      Hemoglobin A1c; Future  -     Lipid Panel; Future  -     TSH+Free T4; Future    13. Screening PSA (prostate specific antigen)  -     CBC & Differential; Future  -     Comprehensive Metabolic Panel; Future  -     PSA Screen; Future  -     Hemoglobin A1c; Future  -     Lipid Panel; Future  -     Uric Acid; Future  -     CBC & Differential; Future  -     Comprehensive Metabolic Panel; Future  -     Hemoglobin A1c; Future  -     Lipid Panel; Future  -     TSH+Free T4; Future    14. Morbid (severe) obesity due to excess calories  -     CBC & Differential; Future  -     Comprehensive Metabolic Panel; Future  -     Hemoglobin A1c; Future  -     Lipid Panel; Future  -     TSH+Free T4; Future    Other orders  -     carvedilol (Coreg) 12.5 MG tablet; Take 1 tablet by mouth 2 (Two) Times a Day With Meals.  Dispense: 60 tablet; Refill: 5        Chest pains was in the hospital May 8 through May 10, 2025 workup unremarkable,,, low risk stress test, normal ejection fraction paradoxical improvement in apical anterior and apical inferior small areas suggesting likely artifact no reversible defects, echocardiogram shows normal ejection fraction calcification of the aortic valve without stenosis normal ejection fraction, CT angiogram of the chest was negative for acute pulmonary embolus no acute cardiopulmonary process April 28, 2025 he does have fatty liver    Hepatic steatosis    Hyperlipidemia continues atorvastatin 20 mg daily,    Morbid obesity    GERD, continues Protonix 40 mg daily    Obstructive sleep apnea    Congestive heart failure diastolic    Acute kidney injury most likely due to diuresis in the hospital improved    Gout, continues allopurinol 100 mg daily    Chronic kidney disease stage IIIa, creatinine at 1.4 May 2025    Pulmonary embolus 2019 ===continues Eliquis 5 mg twice a day    Hypertension, continues lisinopril 40 mg daily, Imdur 30 mg daily, HCTZ 50 mg daily,  atenolol 25 mg daily,== switching atenolol to  carvedilol 12.5 twice a day Esthela 3, 2025    Impaired fasting glucose hemoglobin A1c of 5.9 May 17, 2025    Vitamin D deficiency, continues vitamin D supplements over-the-counter                 Follow Up   Return in about 3 months (around 9/3/2025).  Patient was given instructions and counseling regarding his condition or for health maintenance advice. Please see specific information pulled into the AVS if appropriate.

## 2025-06-11 ENCOUNTER — SPECIALTY PHARMACY (OUTPATIENT)
Dept: CARDIOLOGY | Facility: CLINIC | Age: 55
End: 2025-06-11
Payer: COMMERCIAL

## 2025-06-11 ENCOUNTER — OFFICE VISIT (OUTPATIENT)
Dept: CARDIOLOGY | Facility: CLINIC | Age: 55
End: 2025-06-11
Payer: COMMERCIAL

## 2025-06-11 ENCOUNTER — TELEPHONE (OUTPATIENT)
Dept: CARDIOLOGY | Facility: CLINIC | Age: 55
End: 2025-06-11

## 2025-06-11 VITALS
HEIGHT: 68 IN | OXYGEN SATURATION: 97 % | WEIGHT: 315 LBS | SYSTOLIC BLOOD PRESSURE: 151 MMHG | DIASTOLIC BLOOD PRESSURE: 72 MMHG | BODY MASS INDEX: 47.74 KG/M2 | HEART RATE: 69 BPM

## 2025-06-11 DIAGNOSIS — E66.01 MORBID (SEVERE) OBESITY DUE TO EXCESS CALORIES: ICD-10-CM

## 2025-06-11 DIAGNOSIS — I50.32 DIASTOLIC CHF, CHRONIC: Primary | ICD-10-CM

## 2025-06-11 DIAGNOSIS — G47.33 OBSTRUCTIVE SLEEP APNEA: ICD-10-CM

## 2025-06-11 DIAGNOSIS — E78.2 MIXED HYPERLIPIDEMIA: ICD-10-CM

## 2025-06-11 RX ORDER — CHLORTHALIDONE 50 MG/1
25 TABLET ORAL DAILY
Qty: 45 TABLET | Refills: 2 | Status: SHIPPED | OUTPATIENT
Start: 2025-06-11

## 2025-06-11 NOTE — PROGRESS NOTES
[facility]  INTERVENTIONAL CARDIOLOGY FOLLOW-UP PROGRESS NOTE        Chief Complaint  Diastolic CHF, Left Atrial Dilation, Follow-up, and anxiety /stress    Subjective            History of Present Illness  Kevin Bradshaw is a 54 y.o. male who presents to [facility] CARDIOLOGY    History of Present Illness  The patient presents for evaluation of hypertension, diastolic heart failure, and alcohol use disorder.    He reports a slight elevation in his systolic blood pressure readings at home. His current antihypertensive regimen includes  lisinopril, Hyzaar, and carvedilol 12.5 mg, which was initiated recently.    He is currently awaiting insurance approval for Wegovy, a medication recommended by Dr. Paez. He has experienced some weight loss. He consumes alcohol daily, specifically six cans of beer and two to four shots of Fireball whiskey.    He continues to experience edema, although it has slightly improved. He is on hydrochlorothiazide.    He has a history of back pain but reports no recent episodes of chest pain.    SOCIAL HISTORY  Alcohol: Consumes about six cans of beer and two to four shots of Fireball daily.         Past History:  Past Medical History:   Diagnosis Date    Allergies     Anxiety 08/17/2021    Degeneration of lumbar intervertebral disc 02/05/2016    Fatigue 02/02/2023    GERD (gastroesophageal reflux disease)     Gout     High blood pressure     Hypertension     Impaired fasting glucose 03/15/2022    Male hypogonadism 07/28/2021    Mid back pain 02/24/2016    Obstructive sleep apnea 07/28/2021    Polycythemia, secondary 03/15/2022    Related to ETOH abuse     Pulmonary embolism     Tendinitis of elbow 07/16/2021    Thoracic back pain     Vitamin D deficiency 07/28/2021       Medical History:  Past Medical History:   Diagnosis Date    Allergies     Anxiety 08/17/2021    Degeneration of lumbar intervertebral disc 02/05/2016    Fatigue 02/02/2023    GERD  (gastroesophageal reflux disease)     Gout     High blood pressure     Hypertension     Impaired fasting glucose 03/15/2022    Male hypogonadism 07/28/2021    Mid back pain 02/24/2016    Obstructive sleep apnea 07/28/2021    Polycythemia, secondary 03/15/2022    Related to ETOH abuse     Pulmonary embolism     Tendinitis of elbow 07/16/2021    Thoracic back pain     Vitamin D deficiency 07/28/2021       Surgical History:   has a past surgical history that includes Tonsillectomy; Cholecystectomy; Hemorrhoid surgery; Gallbladder surgery; and Lumbar epidural injection (2015).     Family History:   family history includes Hypertension in his father and mother.     Social History:   reports that he has never smoked. He has never been exposed to tobacco smoke. His smokeless tobacco use includes chew. He reports current alcohol use of about 28.0 standard drinks of alcohol per week. He reports that he does not use drugs.    Allergies:   Capsicum annuum extract & derivative (bell pepper) [capsicum], Onion, and Other    Current Outpatient Medications on File Prior to Visit   Medication Sig    allopurinol (ZYLOPRIM) 100 MG tablet Take 1 tablet by mouth Daily.    apixaban (Eliquis) 5 MG tablet tablet Take 1 tablet by mouth Every 12 (Twelve) Hours.    atorvastatin (LIPITOR) 20 MG tablet Take 1 tablet by mouth Every Night.    carvedilol (Coreg) 12.5 MG tablet Take 1 tablet by mouth 2 (Two) Times a Day With Meals.    cyclobenzaprine (FLEXERIL) 10 MG tablet Take 1 tablet by mouth 3 (Three) Times a Day As Needed for Muscle Spasms.    Diclofenac Sodium (VOLTAREN) 1 % gel gel Apply 4 g topically to the appropriate area as directed 4 (Four) Times a Day As Needed (as needed for pain). (Patient taking differently: Apply 4 g topically to the appropriate area as directed 4 (Four) Times a Day As Needed (as needed for pain). Right elbow)    fluticasone (Flonase) 50 MCG/ACT nasal spray Administer 2 sprays into the nostril(s) as directed by  "provider Daily. (Patient taking differently: Administer 2 sprays into the nostril(s) as directed by provider Daily As Needed for Rhinitis or Allergies.)    lisinopril (PRINIVIL,ZESTRIL) 40 MG tablet Take 0.5 tablets by mouth Daily.    pantoprazole (PROTONIX) 40 MG EC tablet Take 1 tablet by mouth Daily.    [DISCONTINUED] hydroCHLOROthiazide 50 MG tablet     isosorbide mononitrate (IMDUR) 30 MG 24 hr tablet Take 1 tablet by mouth Daily.     No current facility-administered medications on file prior to visit.          Review of Systems   Negative except as mentioned in HPI above.    Objective     /72 (BP Location: Left arm, Patient Position: Sitting, Cuff Size: Large Adult)   Pulse 69   Ht 172.7 cm (67.99\")   Wt (!) 151 kg (332 lb 3.2 oz)   SpO2 97%   BMI 50.53 kg/m²       Physical Exam    General : Alert, awake, no acute distress  Neck : Supple, no carotid bruit, no jugular venous distention  CVS : Regular rate and rhythm, no murmur, rubs or gallops  Lungs: Clear to auscultation bilaterally, no crackles or rhonchi  Abdomen: Soft, nontender, bowel sounds heard in all 4 quadrants  Extremities: Warm, well-perfused, 1+ leg edema    Result Review :     The following data was reviewed by: Neisha San MD on 06/11/2025:    CMP          5/9/2025    05:18 5/10/2025    04:49 5/17/2025    08:32   CMP   Glucose 105  117  103    BUN 32  32  20    Creatinine 1.60  1.43  1.41    EGFR 51.2  58.6  59.6    Sodium 136  134  140    Potassium 4.0  4.0  4.0    Chloride 101  103  106    Calcium 9.0  9.0  8.9    Total Protein   6.8    Albumin  3.4  3.6    Globulin   3.2    Total Bilirubin   0.5    Alkaline Phosphatase   65    AST (SGOT)   25    ALT (SGPT)   19    Albumin/Globulin Ratio   1.1    BUN/Creatinine Ratio 20.0  22.4  14.2    Anion Gap 11.9  8.3  12.8      CBC          5/8/2025    06:44 5/9/2025    05:18 5/17/2025    08:32   CBC   WBC 6.93  8.21  8.28    RBC 5.09  4.78  5.22    Hemoglobin 15.5  14.3  15.7  "   Hematocrit 46.3  44.1  48.1    MCV 91.0  92.3  92.1    MCH 30.5  29.9  30.1    MCHC 33.5  32.4  32.6    RDW 13.2  13.0  12.9    Platelets 332  297  359      TSH          9/14/2024    08:22 4/28/2025    06:48 5/17/2025    08:32   TSH   TSH 2.020  5.460  6.290      Lipid Panel          9/14/2024    08:22 5/8/2025    08:10 5/17/2025    08:32   Lipid Panel   Total Cholesterol 156  128  98    Triglycerides 86  98  122    HDL Cholesterol 50  36  35    VLDL Cholesterol 16  19  22    LDL Cholesterol  90  73  41    LDL/HDL Ratio 1.78  2.01  1.10       A1C Last 3 Results          9/14/2024    08:22 5/8/2025    06:44 5/17/2025    08:32   HGBA1C Last 3 Results   Hemoglobin A1C 5.80  6.20  5.90          Data reviewed: Cardiology studies    Results for orders placed during the hospital encounter of 05/08/25    Adult Transthoracic Echo Complete w/ Color, Spectral and Contrast if necessary per protocol    Interpretation Summary    Left ventricular systolic function is normal. Left ventricular ejection fraction appears to be 56 - 60%.    Left ventricular diastolic function was indeterminate.    There is calcification of the aortic valve without significant stenosis.    Compared to echo from 4/28/2025, Left atrial size appears to be normal.    Results for orders placed during the hospital encounter of 05/08/25    Stress Test With Myocardial Perfusion One Day    Interpretation Summary    Left ventricular ejection fraction is normal (Calculated EF = 56%).    Findings consistent with a normal ECG stress test.    SPECT MPI shows paradoxical improvement in apical anterior and apical inferior small area mild intensity defect, suggesting likely artifact.  No significant reversible defects were seen.    Overall, low risk study.    Procedures  No results found for this or any previous visit.        ASCVD Score  The ASCVD Risk score (Sourav DK, et al., 2019) failed to calculate for the following reasons:    The valid total cholesterol range  is 130 to 320 mg/dL         Assessment and Plan          Diagnoses and all orders for this visit:    1. Diastolic CHF, chronic (Primary)    2. Morbid (severe) obesity due to excess calories    3. Mixed hyperlipidemia    4. Obstructive sleep apnea    Other orders  -     chlorthalidone (HYGROTEN) 50 MG tablet; Take 0.5 tablets by mouth Daily.  Dispense: 45 tablet; Refill: 2  -     empagliflozin (Jardiance) 10 MG tablet tablet; Take 1 tablet by mouth Daily.  Dispense: 90 tablet; Refill: 2          Assessment & Plan  1. Hypertension:  - Systolic blood pressure is slightly elevated.  - Discussed the potential impact of stress and anxiety on blood pressure.  - Discussed the short-term and long-term effects of alcohol on blood pressure.  - Discontinue hydrochlorothiazide and start chlorthalidone 25 mg daily for better BP control and diuresis.  - Monitor salt intake.  - Reduce beer consumption.    2. Diastolic heart failure:  - Diastolic heart failure may be exacerbated by alcohol consumption.  - Addition of Jardiance 10 mg daily was discussed.  - Agreed to the treatment plan.  - Prescription sent to pharmacy.    3. Alcohol use disorder:  - Reported drinking about six cans of beer and two to four shots of Fireball daily.  - Waiting for insurance approval for Wegovy to help with alcoholism.  - Advised to reduce alcohol intake as it negatively affects blood pressure and heart condition.      Patient is being worked up for Wegovy approval to help with weight loss.  Medication will benefit in addition to diet and exercise.    Follow-up:  - Follow up in 6 months.        Patient was educated on heart healthy diet, daily exercise and achieving optimal weight.     Follow Up     Return in about 6 months (around 12/11/2025) for Next scheduled follow up, With Bianca Galvez.      Kevin NORWOOD Leeann  reports that he has never smoked. He has never been exposed to tobacco smoke. His smokeless tobacco use includes chew.             I  spent 30 minutes caring for this patient on this date of service. This time includes time spent by me in the following activities:preparing for the visit, reviewing tests, obtaining and/or reviewing a separately obtained history, performing a medically appropriate examination and/or evaluation , counseling and educating the patient/family/caregiver, ordering medications, tests, or procedures, referring and communicating with other health care professionals , documenting information in the medical record, independently interpreting results and communicating that information with the patient/family/caregiver, and care coordination.    I have reviewed the family history, social history, and past medical history for this patient. Previous information and data has been reviewed and updated as needed. I have reviewed and verified the chief complaint, history, and other documentation. The patient was interviewed and examined in the clinic and the chart reviewed. The previous observations, recommendations, and conclusions were reviewed including those of other providers.     The plan was discussed with the patient and/or family. The patient was given time to ask questions and these questions were answered. At the conclusion of their visit they had no additional questions or concerns and all questions were answered to their satisfaction.     Patient was given instructions and counseling regarding her condition or for health maintenance advice. Please see specific information pulled into the AVS if appropriate.      Patient or patient representative verbalized consent for the use of Ambient Listening during the visit with  Neisha San MD for chart documentation. 6/11/2025  11:43 EDT      Neisha San MD, [facility]  06/11/25  11:41 EDT    Dictated Utilizing Dragon Dictation

## 2025-06-11 NOTE — LETTER
June 11, 2025     Dony Paez MD  [address]  [address]  [place] [address]    Patient: Kevin Bradshaw   YOB: 1970   Date of Visit: 6/11/2025       Dear Dony Paez MD,    Kevin Bradshaw was in my office today. Below are the relevant portions of my assessment and plan of care.           If you have questions, please do not hesitate to call me. I look forward to following Kevin along with you.         Sincerely,        Neisha San MD        CC: MD Rakan Martini DO Dafang Chen, MD

## 2025-06-12 ENCOUNTER — SPECIALTY PHARMACY (OUTPATIENT)
Dept: CARDIOLOGY | Facility: CLINIC | Age: 55
End: 2025-06-12
Payer: COMMERCIAL

## 2025-06-12 ENCOUNTER — SPECIALTY PHARMACY (OUTPATIENT)
Facility: HOSPITAL | Age: 55
End: 2025-06-12
Payer: COMMERCIAL

## 2025-06-12 NOTE — PROGRESS NOTES
Specialty Pharmacy Patient Management Program  Refill Outreach     Kevin was contacted today regarding refills of their medication(s).        Delivery Questions      Flowsheet Row Most Recent Value   Delivery method [employer]   Delivery address verified with patient/caregiver? Yes   Delivery address Home   Number of medications in delivery 1   Medication(s) being filled and delivered Empagliflozin (JARDIANCE)   Doses left of specialty medications 0 New Start   Copay verified? Yes   Copay amount $0.00   Copay form of payment No copayment ($0)   Delivery Date Selection 06/13/25   Signature Required No   Do you consent to receive electronic handouts?  No                 Follow-up: 26 day(s)     Randall Alvarez, Pharmacy Technician  6/12/2025  11:59 EDT

## 2025-06-12 NOTE — PROGRESS NOTES
Specialty Pharmacy Patient Management Program  Cardiology Initial Assessment     Kevin ENMA Bradshaw was referred by a Cardiology provider to the Cardiology Patient Management program offered by [facility] for Heart Failure on 06/12/25.  An initial outreach was conducted, including assessment of therapy appropriateness and specialty medication education for Jardiance. The patient was introduced to services offered by [facility], including: regular assessments, refill coordination, mail order delivery options, prior authorization maintenance, and financial assistance programs as applicable. The patient was also provided with contact information for the pharmacy team.     Insurance Coverage & Financial Support  [facility]     Relevant Past Medical History and Comorbidities  Relevant medical history and concomitant health conditions were discussed with the patient. The patient's chart has been reviewed for relevant past medical history and comorbid conditions and updated as necessary.  Past Medical History:   Diagnosis Date    Allergies     Anxiety 08/17/2021    Degeneration of lumbar intervertebral disc 02/05/2016    Fatigue 02/02/2023    GERD (gastroesophageal reflux disease)     Gout     High blood pressure     Hypertension     Impaired fasting glucose 03/15/2022    Male hypogonadism 07/28/2021    Mid back pain 02/24/2016    Obstructive sleep apnea 07/28/2021    Polycythemia, secondary 03/15/2022    Related to ETOH abuse     Pulmonary embolism     Tendinitis of elbow 07/16/2021    Thoracic back pain     Vitamin D deficiency 07/28/2021     Social History     Socioeconomic History    Marital status:    Tobacco Use    Smoking status: Never     Passive exposure: Never    Smokeless tobacco: Current     Types: Chew   Vaping Use    Vaping status: Never Used   Substance and Sexual Activity    Alcohol use: Yes     Alcohol/week: 28.0 standard drinks of alcohol     Types: 28  "Standard drinks or equivalent per week     Comment: occais--\"3 per day\"    Drug use: Never    Sexual activity: Defer       Problem list reviewed by Conchsi Vazquez RPH on 6/12/2025 at 11:49 AM    Allergies  Known allergies and reactions were discussed with the patient. The patient's chart has been reviewed for  allergy information and updated as necessary.   Allergies   Allergen Reactions    Capsicum Annuum Extract & Derivative (Bell Pepper) [Capsicum] GI Intolerance    Onion GI Intolerance    Other GI Intolerance     Onions, peppers       Allergies reviewed by Conchis Vazquez RPH on 6/12/2025 at 11:40 AM    Relevant Laboratory Values  Relevant laboratory values were discussed with the patient. The following specialty medication dose adjustment(s) are recommended: Jardiance    Lab Results   Component Value Date    GLUCOSE 103 (H) 05/17/2025    CALCIUM 8.9 05/17/2025     05/17/2025    K 4.0 05/17/2025    CO2 21.2 (L) 05/17/2025     05/17/2025    BUN 20 05/17/2025    CREATININE 1.41 (H) 05/17/2025    EGFRIFNONA 104 08/14/2021    BCR 14.2 05/17/2025    ANIONGAP 12.8 05/17/2025     Lab Results   Component Value Date    CHOL 98 05/17/2025    CHLPL 125 03/22/2021    TRIG 122 05/17/2025    HDL 35 (L) 05/17/2025    LDL 41 05/17/2025         Current Medication List  This medication list has been reviewed with the patient and evaluated for any interactions or necessary modifications/recommendations, and updated to include all prescription medications, OTC medications, and supplements the patient is currently taking.  This list reflects what is contained in the patient's profile, which has also been marked as reviewed to communicate to other providers it is the most up to date version of the patient's current medication therapy.     Current Outpatient Medications:     allopurinol (ZYLOPRIM) 100 MG tablet, Take 1 tablet by mouth Daily., Disp: 30 tablet, Rfl: 5    apixaban (Eliquis) 5 MG " tablet tablet, Take 1 tablet by mouth Every 12 (Twelve) Hours., Disp: 60 tablet, Rfl: 5    atorvastatin (LIPITOR) 20 MG tablet, Take 1 tablet by mouth Every Night., Disp: 30 tablet, Rfl: 0    carvedilol (Coreg) 12.5 MG tablet, Take 1 tablet by mouth 2 (Two) Times a Day With Meals., Disp: 60 tablet, Rfl: 5    chlorthalidone (HYGROTEN) 50 MG tablet, Take 0.5 tablets by mouth Daily., Disp: 45 tablet, Rfl: 2    cyclobenzaprine (FLEXERIL) 10 MG tablet, Take 1 tablet by mouth 3 (Three) Times a Day As Needed for Muscle Spasms., Disp: 30 tablet, Rfl: 0    Diclofenac Sodium (VOLTAREN) 1 % gel gel, Apply 4 g topically to the appropriate area as directed 4 (Four) Times a Day As Needed (as needed for pain). (Patient taking differently: Apply 4 g topically to the appropriate area as directed 4 (Four) Times a Day As Needed (as needed for pain). Right elbow), Disp: 100 g, Rfl: 1    empagliflozin (Jardiance) 10 MG tablet tablet, Take 1 tablet by mouth Daily., Disp: 90 tablet, Rfl: 3    fluticasone (Flonase) 50 MCG/ACT nasal spray, Administer 2 sprays into the nostril(s) as directed by provider Daily. (Patient taking differently: Administer 2 sprays into the nostril(s) as directed by provider Daily As Needed for Rhinitis or Allergies.), Disp: 9.9 g, Rfl: 3    isosorbide mononitrate (IMDUR) 30 MG 24 hr tablet, Take 1 tablet by mouth Daily., Disp: 30 tablet, Rfl: 0    lisinopril (PRINIVIL,ZESTRIL) 40 MG tablet, Take 0.5 tablets by mouth Daily., Disp: 15 tablet, Rfl: 0    pantoprazole (PROTONIX) 40 MG EC tablet, Take 1 tablet by mouth Daily., Disp: 90 tablet, Rfl: 1  No current facility-administered medications for this visit.    Medicines reviewed by Conchis Vazquez [facility] on 6/12/2025 at 11:40 AM    Drug Interactions  None    Initial Education Provided for Specialty Medication  The patient has been provided with the following education and any applicable administration techniques (i.e. self-injection) have been demonstrated  for the therapies indicated. All questions and concerns have been addressed prior to the patient receiving the medication, and the patient has verbalized comprehension of the education and any materials provided. Additional patient education shall be provided and documented upon request by the patient, provider, or payer.    JARDIANCE® (empagliflozin)  Medication Expectations   Why am I taking this medication? You could be taking this medication for several reasons:  To lower blood sugar because you have type 2 diabetes  To reduce your risk of death from heart attack or stroke if you have heart disease and type 2 diabetes  To reduce your risk of death or hospitalization for heart failure  To reduce your risk of further kidney damage, death, or hospitalization if you have chronic kidney disease   What should I expect while on this medication? You should expect to see your blood sugar and A1c decrease over time if you have diabetes. You may also see a decrease in your blood pressure and it can help some people lose weight.     How does the medication work? Jardiance works by helping to remove some sugar that the body doesn't need through urination.    How long will I be on this medication for? The amount of time you will be on this medication will be determined by your doctor based on blood sugar and A1c control. You will most likely be on this medication or another diabetes medication throughout your lifetime. Do not abruptly stop this medication without talking to your doctor first.    How do I take this medication? Take as directed on your prescription label. This medication is usually taken in the morning and can be given with or without food.    What are some possible side effects? You may notice increased urination, especially when you first start Jardiance. The most common side effects are urinary tract infections and yeast infections and are more commonly seen in females. Talk with your doctor if you notice  white or yellow vaginal discharge, vaginal itching or odor of if you notice redness, itching, pain, or swelling of the penis and/or bad-smelling discharge from the penis.    What happens if I miss a dose? If you miss a dose, take it as soon as you remember. If it is close to your next dose, skip it (do not take 2 doses at once)     Medication Safety   What are things I should warn my doctor immediately about? Tell your doctor if you have kidney disease, liver disease, heart failure, pancreas problems, or history of frequent genital yeast or urinary tract infections. Tell your doctor if you are on a low-salt diet, if you drink alcohol, or if you are having surgery. Talk to your doctor if you are pregnant, planning to become pregnant, or breastfeeding. Also tell your doctor if you notice any signs/symptoms of an allergic reaction (rash, hives, difficulty breathing, etc.).   What are things that I should be cautious of? Be cautious of any side effects from this medication. Talk to your doctor if any new ones develop or aren't getting better.   What are some medications that can interact with this one? Some medications that interact include diuretics (water pills) and other medications that may also lower your blood sugar such as insulins and glipizide/glimepiride/glyburide. Your doctor may reduce the dose of these medications when you start Jardiance to minimize low blood sugars. Always tell your doctor or pharmacist immediately if you start taking any new medications, including over-the-counter medications, vitamins, and herbal supplements.      Medication Storage/Handling   How should I handle this medication? Keep this medication out of reach of pets/children in tightly sealed container   How does this medication need to be stored? Store at room temperature and keep dry (don't keep in bathroom or other room with moisture)   How should I dispose of this medication? There should not be a need to dispose of this  medication unless your provider decides to change the dose or therapy. If that is the case, take to your local police station for proper disposal. Some pharmacies also have take-back bins for medication drop-off.      Resources/Support   How can I remind myself to take this medication? You can download reminder apps to help you manage your refills. You may also set an alarm on your phone to remind you. The pharmacy carries pill boxes that you can place next to an area you pass everyday (such as where you place your car keys or where you charge your phone)   Is financial support available?  [employer] can provide co-pay cards if you have commercial insurance or patient assistance if you have Medicare or no insurance.    Which vaccines are recommended for me? Talk to your doctor about these vaccines: Flu, Coronavirus (COVID-19), Pneumococcal (pneumonia), Tdap, Hepatitis B, Zoster (shingles)      Adherence and Self-Administration  Adherence related to the patient's specialty therapy was discussed with the patient. The Adherence segment of this outreach has been reviewed and updated.     Is there a concern with patient's ability to self administer the medication correctly and without issue?: No  Were any potential barriers to adherence identified during the initial assessment or patient education?: No  Are there any concerns regarding the patient's understanding of the importance of medication adherence?: No  Methods for Supporting Patient Adherence and/or Self-Administration: None    Open Medication Therapy Problems  No medication therapy recommendations to display    Goals of Therapy  Goals related to the patient's specialty therapy were discussed with the patient. The Patient Goals segment of this outreach has been reviewed and updated.   Goals Addressed Today        Specialty Pharmacy General Goal      Decrease hospitalizations and symptoms due to heart failure from baseline.            Reassessment  Plan & Follow-Up  1. Medication Therapy Changes: Jardiacne 10mg by mouth every day  2. Related Plans, Therapy Recommendations, or Therapy Problems to Be Addressed: None  3. Pharmacist to perform regular assessments no more than (6) months from the previous assessment.  4. Care Coordinator to set up future refill outreaches, coordinate prescription delivery, and escalate clinical questions to pharmacist.  5. Welcome information and patient satisfaction survey to be sent by specialty pharmacy team with patient's initial fill.    Attestation  Therapeutic appropriateness: Appropriate   I attest the patient was actively involved in and has agreed to the above plan of care. If the prescribed therapy is at any point deemed not appropriate based on the current or future assessments, a consultation will be initiated with the patient's specialty care provider to determine the best course of action. The revised plan of therapy will be documented along with any required assessments and/or additional patient education provided.     Conchis Colunga, AmandeepD  Clinical Specialty Pharmacist, Cardiology  6/12/2025  11:50 EDT

## 2025-06-29 DIAGNOSIS — K21.9 GASTRO-ESOPHAGEAL REFLUX DISEASE WITHOUT ESOPHAGITIS: Chronic | ICD-10-CM

## 2025-06-30 RX ORDER — PANTOPRAZOLE SODIUM 40 MG/1
40 TABLET, DELAYED RELEASE ORAL DAILY
Qty: 90 TABLET | Refills: 1 | Status: SHIPPED | OUTPATIENT
Start: 2025-06-30

## 2025-07-17 ENCOUNTER — SPECIALTY PHARMACY (OUTPATIENT)
Dept: CARDIOLOGY | Facility: CLINIC | Age: 55
End: 2025-07-17
Payer: COMMERCIAL

## 2025-07-17 NOTE — PROGRESS NOTES
Specialty Pharmacy Patient Management Program  Refill Outreach     Kevin was contacted today regarding refills of their medication(s).    Refill Questions      Flowsheet Row Most Recent Value   Changes to allergies? No   Changes to medications? No   New conditions or infections since last clinic visit No   Unplanned office visit, urgent care, ED, or hospital admission in the last 4 weeks  No   How does patient/caregiver feel medication is working? Good   Financial problems or insurance changes  No   Since the previous refill, were any specialty medication doses or scheduled injections missed or delayed?  No   Does this patient require a clinical escalation to a pharmacist? No            Delivery Questions      Flowsheet Row Most Recent Value   Delivery method [employer]   Delivery address verified with patient/caregiver? Yes   Delivery address Home   Number of medications in delivery 1   Medication(s) being filled and delivered Empagliflozin (JARDIANCE)   Doses left of specialty medications 2   Copay verified? Yes   Copay amount $0.00   Copay form of payment No copayment ($0)   Delivery Date Selection 07/18/25   Signature Required No   Do you consent to receive electronic handouts?  No                 Follow-up: 25 day(s)     Randall Alvarez, Pharmacy Technician  7/17/2025  09:52 EDT

## 2025-07-21 RX ORDER — ALLOPURINOL 100 MG/1
100 TABLET ORAL DAILY
Qty: 30 TABLET | Refills: 5 | Status: SHIPPED | OUTPATIENT
Start: 2025-07-21

## 2025-07-21 RX ORDER — APIXABAN 5 MG/1
5 TABLET, FILM COATED ORAL
Qty: 60 TABLET | Refills: 5 | Status: SHIPPED | OUTPATIENT
Start: 2025-07-21

## 2025-07-21 RX ORDER — HYDROCHLOROTHIAZIDE 50 MG/1
50 TABLET ORAL DAILY
Qty: 30 TABLET | Refills: 5 | OUTPATIENT
Start: 2025-07-21

## 2025-07-25 DIAGNOSIS — E78.2 MIXED HYPERLIPIDEMIA: Primary | ICD-10-CM

## 2025-07-25 RX ORDER — ATORVASTATIN CALCIUM 20 MG/1
20 TABLET, FILM COATED ORAL NIGHTLY
Qty: 90 TABLET | Refills: 3 | Status: SHIPPED | OUTPATIENT
Start: 2025-07-25

## 2025-07-25 NOTE — TELEPHONE ENCOUNTER
Caller: Kevin Bradshaw    Relationship: Self    Best call back number: [phone]    Requested Prescriptions:   Requested Prescriptions     Pending Prescriptions Disp Refills    atorvastatin (LIPITOR) 20 MG tablet 90 tablet 3     Sig: Take 1 tablet by mouth Every Night.        Pharmacy where request should be sent:      Last office visit with prescribing clinician: 6/3/2025   Last telemedicine visit with prescribing clinician: Visit date not found   Next office visit with prescribing clinician: 9/3/2025     Additional details provided by patient: PATIENT PRESCRIBED FROM ED VISIT 5/10/2025 HAS 1 TABLET LEFT. PER LAST OV PT IS TO TAKE 20 MG NIGHTLY.     Does the patient have less than a 3 day supply:  [x] Yes  [] No    Would you like a call back once the refill request has been completed: [] Yes [] No    If the office needs to give you a call back, can they leave a voicemail: [] Yes [] No    Wilder Lopez MA   07/25/25 12:05 EDT

## 2025-07-28 RX ORDER — HYDROCHLOROTHIAZIDE 50 MG/1
50 TABLET ORAL DAILY
COMMUNITY
End: 2025-07-28 | Stop reason: SDUPTHER

## 2025-07-28 RX ORDER — HYDROCHLOROTHIAZIDE 50 MG/1
50 TABLET ORAL DAILY
Qty: 30 TABLET | Refills: 0 | Status: SHIPPED | OUTPATIENT
Start: 2025-07-28

## 2025-07-28 RX ORDER — HYDROCHLOROTHIAZIDE 50 MG/1
50 TABLET ORAL DAILY
Qty: 30 TABLET | Refills: 5 | OUTPATIENT
Start: 2025-07-28

## 2025-08-05 RX ORDER — HYDROCHLOROTHIAZIDE 50 MG/1
50 TABLET ORAL DAILY
Qty: 90 TABLET | Refills: 3 | Status: SHIPPED | OUTPATIENT
Start: 2025-08-05

## 2025-08-11 ENCOUNTER — SPECIALTY PHARMACY (OUTPATIENT)
Dept: CARDIOLOGY | Facility: CLINIC | Age: 55
End: 2025-08-11
Payer: COMMERCIAL

## 2025-08-19 RX ORDER — HYDROCHLOROTHIAZIDE 50 MG/1
50 TABLET ORAL DAILY
Qty: 90 TABLET | Refills: 3 | OUTPATIENT
Start: 2025-08-19

## 2025-08-19 RX ORDER — LISINOPRIL 40 MG/1
20 TABLET ORAL DAILY
Qty: 15 TABLET | Refills: 0 | Status: SHIPPED | OUTPATIENT
Start: 2025-08-19

## 2025-08-19 RX ORDER — ALLOPURINOL 100 MG/1
100 TABLET ORAL DAILY
Qty: 30 TABLET | Refills: 5 | Status: SHIPPED | OUTPATIENT
Start: 2025-08-19

## 2025-08-26 ENCOUNTER — TELEPHONE (OUTPATIENT)
Dept: CARDIOLOGY | Facility: CLINIC | Age: 55
End: 2025-08-26
Payer: COMMERCIAL

## 2025-08-26 DIAGNOSIS — M25.521 RIGHT ELBOW PAIN: ICD-10-CM

## 2025-08-26 DIAGNOSIS — M77.11 LATERAL EPICONDYLITIS OF RIGHT ELBOW: ICD-10-CM

## 2025-08-28 ENCOUNTER — HOSPITAL ENCOUNTER (OUTPATIENT)
Dept: RESPIRATORY THERAPY | Facility: HOSPITAL | Age: 55
Discharge: HOME | End: 2025-08-28
Admitting: STUDENT IN AN ORGANIZED HEALTH CARE EDUCATION/TRAINING PROGRAM
Payer: COMMERCIAL

## 2025-08-28 DIAGNOSIS — R06.09 DYSPNEA ON EXERTION: ICD-10-CM

## 2025-08-28 DIAGNOSIS — R06.02 SHORTNESS OF BREATH: ICD-10-CM

## 2025-08-28 DIAGNOSIS — J96.01 ACUTE RESPIRATORY FAILURE WITH HYPOXIA: ICD-10-CM

## 2025-08-28 PROCEDURE — 94060 EVALUATION OF WHEEZING: CPT

## 2025-08-28 PROCEDURE — 94726 PLETHYSMOGRAPHY LUNG VOLUMES: CPT

## 2025-08-28 PROCEDURE — 94729 DIFFUSING CAPACITY: CPT

## 2025-08-28 RX ORDER — ALBUTEROL SULFATE 0.83 MG/ML
2.5 SOLUTION RESPIRATORY (INHALATION) ONCE
Status: COMPLETED | OUTPATIENT
Start: 2025-08-28 | End: 2025-08-28

## 2025-08-28 RX ADMIN — ALBUTEROL SULFATE 2.5 MG: 2.5 SOLUTION RESPIRATORY (INHALATION) at 13:45
